# Patient Record
Sex: FEMALE | Race: WHITE | NOT HISPANIC OR LATINO | Employment: FULL TIME | URBAN - METROPOLITAN AREA
[De-identification: names, ages, dates, MRNs, and addresses within clinical notes are randomized per-mention and may not be internally consistent; named-entity substitution may affect disease eponyms.]

---

## 2017-01-18 ENCOUNTER — OFFICE VISIT (OUTPATIENT)
Dept: CARDIOLOGY | Facility: CLINIC | Age: 57
End: 2017-01-18

## 2017-01-18 VITALS
BODY MASS INDEX: 45.99 KG/M2 | HEART RATE: 60 BPM | HEIGHT: 67 IN | WEIGHT: 293 LBS | SYSTOLIC BLOOD PRESSURE: 116 MMHG | DIASTOLIC BLOOD PRESSURE: 74 MMHG

## 2017-01-18 DIAGNOSIS — I48.91 ATRIAL FIBRILLATION, UNSPECIFIED TYPE (HCC): Primary | ICD-10-CM

## 2017-01-18 DIAGNOSIS — I48.0 PAROXYSMAL ATRIAL FIBRILLATION (HCC): Primary | ICD-10-CM

## 2017-01-18 DIAGNOSIS — Z79.01 ANTICOAGULATED: ICD-10-CM

## 2017-01-18 DIAGNOSIS — Z92.29 H/O AMIODARONE THERAPY: ICD-10-CM

## 2017-01-18 DIAGNOSIS — I10 ESSENTIAL HYPERTENSION: ICD-10-CM

## 2017-01-18 PROCEDURE — 99214 OFFICE O/P EST MOD 30 MIN: CPT | Performed by: INTERNAL MEDICINE

## 2017-01-18 PROCEDURE — 93000 ELECTROCARDIOGRAM COMPLETE: CPT | Performed by: INTERNAL MEDICINE

## 2017-01-18 NOTE — PROGRESS NOTES
Subjective:       Anne Gentile is a 56 y.o. female who here for follow up    CC  Atrial fibrillation follow-up  HPI  56-year-old white female with known history of recurrent atrial fibrillation, now on amiodarone, continues to have symptomatic atrial fibrillation, on chronic anticoagulation, complains of shortness of breath but no chest pain, shortness of breath is also associated with fatigue, the symptoms have gotten worse over the last few months indicating patient is back in atrial fibrillation during that time     Problem List Items Addressed This Visit        Cardiovascular and Mediastinum    Hypertension    Atrial fibrillation - Primary    Relevant Orders    ECG 12 Lead       Other    H/O amiodarone therapy    Anticoagulated        Previous treatments/evaluations include: ASA and beta blocker. Cardiac risk factors: advanced age (older than 55 for men, 65 for women) and hypertension.    The following portions of the patient's history were reviewed and updated as appropriate: allergies, current medications, past family history, past medical history, past social history, past surgical history and problem list.    Past Medical History   Diagnosis Date   • Arthritis    • Atrial fibrillation    • CHF (congestive heart failure)    • Depression    • Disease of thyroid gland    • Hypertension     reports that she quit smoking about 5 years ago. She has a 30.00 pack-year smoking history. She has never used smokeless tobacco. She reports that she does not drink alcohol or use illicit drugs.  Family History   Problem Relation Age of Onset   • Cancer Mother    • COPD Father    • Heart disease Father    • Hypertension Father        Review of Systems  Constitutional: No wt loss, fever, fatigue  Gastrointestinal: No nausea, abdominal pain  Behavioral/Psych: No insomnia or anxiety   Cardiovascular shortness of breath  Objective:       Physical Exam             Physical Exam  Visit Vitals   • /74   • Pulse 60   •  "Ht 67\" (170.2 cm)   • Wt (!) 370 lb (168 kg)   • BMI 57.95 kg/m2     General appearance: alert, appears stated age and cooperative, oriented x 3  Neck: no JVD and supple, symmetrical, trachea midline  Lungs: clear to auscultation bilaterally  Heart:Normal PMI,  S1, S2 irregular, no murmur, rub or gallop  Extremities: normal range of motion, no cyanosis or edema,  Pulses: 2+ and symmetric  Skin: Skin color, texture, turgor normal. No rashes or lesions  Psych:  Pleasant and cooperative        Cardiographics  @  ECG 12 Lead  Date/Time: 1/18/2017 10:22 AM  Performed by: AZAM ANGEL  Authorized by: AZAM ANGEL   Comparison: compared with previous ECG   Similar to previous ECG  Rhythm: atrial fibrillation  Clinical impression: abnormal ECG            Echocardiogram:        Current Outpatient Prescriptions:   •  azelastine (ASTELIN) 0.1 % nasal spray, 1-2 sprays into each nostril., Disp: , Rfl:   •  dabigatran etexilate (PRADAXA) 150 MG capsu, Take 1 capsule by mouth 2 (Two) Times a Day., Disp: 30 capsule, Rfl: 0  •  escitalopram (LEXAPRO) 10 MG tablet, TAKE ONE TABLET BY MOUTH DAILY, Disp: , Rfl:   •  fluticasone (FLONASE) 50 MCG/ACT nasal spray, 1 spray into each nostril., Disp: , Rfl:   •  furosemide (LASIX) 20 MG tablet, TAKE ONE TABLET BY MOUTH DAILY, Disp: , Rfl:   •  irbesartan (AVAPRO) 300 MG tablet, TAKE ONE TABLET BY MOUTH DAILY, Disp: , Rfl:   •  nitroglycerin (NITROSTAT) 0.4 MG SL tablet, 1 under the tongue as needed for angina, may repeat q5mins for up three doses, Disp: 100 tablet, Rfl: 11  •  omeprazole (PriLOSEC) 20 MG capsule, Take 40 mg by mouth., Disp: , Rfl:   •  topiramate (TOPAMAX) 50 MG tablet, TAKE ONE TABLET BY MOUTH DAILY, Disp: , Rfl:    Assessment:        Patient Active Problem List   Diagnosis   • Malignant hypertension   • Acute rhinosinusitis   • Knee pain   • Chest pain at rest   • Chest pain   • Chronic headache   • Congestion of respiratory tract   • Depression   • " Gastroesophageal reflux disease without esophagitis   • Hypertension   • Hypothyroidism   • Headache   • Morbid obesity   • Other specified disorders of nose and paranasal sinuses   • Difficulty sleeping   • Unstable angina pectoris   • Atrial fibrillation               Plan:            ICD-10-CM ICD-9-CM   1. Paroxysmal atrial fibrillation I48.0 427.31   2. H/O amiodarone therapy Z92.29 V87.49   3. Essential hypertension I10 401.9   4. Anticoagulated Z79.01 V58.61     Anne Gentile IS ON AMIODARONE,   Significant side effects associated with this medication has been explained     Pros and cons of the medications has been discussed, decision has been to continue the medication   6 months to yearly checkup for eye examination, thyroid function test, chest x-ray and liver function test has been advised    Patient understands well      Procedure , risks and options of cardioversion has been explained. Anne Gentile understands well and agrees.  COUNSELING:    Anne ReidsCounseling was given to patient for the following topics: diagnostic results, risk factor reductions, impressions, risks and benefits of treatment options and importance of treatment compliance .       SMOKING COUNSELING:    Counseling given: Not Answered      EMR Dragon/Transcription disclaimer:   Much of this encounter note is an electronic transcription/translation of spoken language to printed text. The electronic translation of spoken language may permit erroneous, or at times, nonsensical words or phrases to be inadvertently transcribed; Although I have reviewed the note for such errors, some may still exist.

## 2017-01-18 NOTE — MR AVS SNAPSHOT
Anne Seferino   1/18/2017 9:15 AM   Office Visit    Dept Phone:  [phone]   Encounter #:  [id number]    Provider:  Filemon Kumari MD   Department:  [facility]                Your Full Care Plan              Your Updated Medication List          This list is accurate as of: 1/18/17 10:45 AM.  Always use your most recent med list.                azelastine 0.1 % nasal spray   Commonly known as:  ASTELIN       dabigatran etexilate 150 MG capsu   Commonly known as:  PRADAXA   Take 1 capsule by mouth 2 (Two) Times a Day.       escitalopram 10 MG tablet   Commonly known as:  LEXAPRO       fluticasone 50 MCG/ACT nasal spray   Commonly known as:  FLONASE       furosemide 20 MG tablet   Commonly known as:  LASIX       irbesartan 300 MG tablet   Commonly known as:  AVAPRO       nitroglycerin 0.4 MG SL tablet   Commonly known as:  NITROSTAT   1 under the tongue as needed for angina, may repeat q5mins for up three doses       omeprazole 20 MG capsule   Commonly known as:  priLOSEC       topiramate 50 MG tablet   Commonly known as:  TOPAMAX               We Performed the Following     ECG 12 Lead       You Were Diagnosed With        Codes Comments    Paroxysmal atrial fibrillation    -  Primary ICD-10-CM: I48.0  ICD-9-CM: 427.31     H/O amiodarone therapy     ICD-10-CM: Z92.29  ICD-9-CM: V87.49       Instructions     None    Patient Instructions History      Upcoming Appointments     Visit Type Date Time Department    FOLLOW UP 1/18/2017  9:15 AM [facility]      [employer] Signup     Our records indicate that you have an active [employer] account.    You can view your After Visit Summary by going to [employer] and logging in with your [employer] username and password.  If you don't have a [employer] username and password but a parent or guardian has access to your record, the parent or guardian should login with their  "own [employer] username and password and access your record to view the After Visit Summary.    If you have questions, you can email [email or web address] or call [phone] to talk to our [employer] staff.  Remember, [employer] is NOT to be used for urgent needs.  For medical emergencies, dial 911.               Other Info from Your Visit           Allergies     No Known Allergies      Reason for Visit     Follow-up Atrial Fibrillation      Vital Signs     Blood Pressure Pulse Height Weight Body Mass Index Smoking Status    116/74 60 67\" (170.2 cm) 370 lb (168 kg) 57.95 kg/m2 Former Smoker      Problems and Diagnoses Noted     Atrial fibrillation (irregular heartbeat)    History of amiodarone therapy            "

## 2017-01-23 ENCOUNTER — HOSPITAL ENCOUNTER (OUTPATIENT)
Facility: HOSPITAL | Age: 57
Setting detail: OBSERVATION
Discharge: HOME | End: 2017-01-25
Attending: INTERNAL MEDICINE | Admitting: INTERNAL MEDICINE

## 2017-01-23 ENCOUNTER — APPOINTMENT (OUTPATIENT)
Dept: GENERAL RADIOLOGY | Facility: HOSPITAL | Age: 57
End: 2017-01-23

## 2017-01-23 PROBLEM — I48.91 A-FIB (HCC): Status: ACTIVE | Noted: 2017-01-23

## 2017-01-23 LAB
ALBUMIN SERPL-MCNC: 3.9 G/DL (ref 3.5–5.2)
ALBUMIN/GLOB SERPL: 1.1 G/DL
ALP SERPL-CCNC: 106 U/L (ref 39–117)
ALT SERPL W P-5'-P-CCNC: 19 U/L (ref 1–33)
ANION GAP SERPL CALCULATED.3IONS-SCNC: 11.4 MMOL/L
AST SERPL-CCNC: 17 U/L (ref 1–32)
BASOPHILS # BLD AUTO: 0.03 10*3/MM3 (ref 0–0.2)
BASOPHILS NFR BLD AUTO: 0.5 % (ref 0–1.5)
BILIRUB SERPL-MCNC: 0.3 MG/DL (ref 0.1–1.2)
BUN BLD-MCNC: 15 MG/DL (ref 6–20)
BUN/CREAT SERPL: 16 (ref 7–25)
CALCIUM SPEC-SCNC: 9.3 MG/DL (ref 8.6–10.5)
CHLORIDE SERPL-SCNC: 105 MMOL/L (ref 98–107)
CO2 SERPL-SCNC: 27.6 MMOL/L (ref 22–29)
CREAT BLD-MCNC: 0.94 MG/DL (ref 0.57–1)
DEPRECATED RDW RBC AUTO: 50.8 FL (ref 37–54)
EOSINOPHIL # BLD AUTO: 0.15 10*3/MM3 (ref 0–0.7)
EOSINOPHIL NFR BLD AUTO: 2.4 % (ref 0.3–6.2)
ERYTHROCYTE [DISTWIDTH] IN BLOOD BY AUTOMATED COUNT: 13.7 % (ref 11.7–13)
GFR SERPL CREATININE-BSD FRML MDRD: 61 ML/MIN/1.73
GLOBULIN UR ELPH-MCNC: 3.4 GM/DL
GLUCOSE BLD-MCNC: 99 MG/DL (ref 65–99)
HCT VFR BLD AUTO: 40.3 % (ref 35.6–45.5)
HGB BLD-MCNC: 12.2 G/DL (ref 11.9–15.5)
IMM GRANULOCYTES # BLD: 0 10*3/MM3 (ref 0–0.03)
IMM GRANULOCYTES NFR BLD: 0 % (ref 0–0.5)
INR PPP: 1.42 (ref 0.9–1.1)
LYMPHOCYTES # BLD AUTO: 1.95 10*3/MM3 (ref 0.9–4.8)
LYMPHOCYTES NFR BLD AUTO: 31.5 % (ref 19.6–45.3)
MAGNESIUM SERPL-MCNC: 2.4 MG/DL (ref 1.6–2.6)
MCH RBC QN AUTO: 30.5 PG (ref 26.9–32)
MCHC RBC AUTO-ENTMCNC: 30.3 G/DL (ref 32.4–36.3)
MCV RBC AUTO: 100.8 FL (ref 80.5–98.2)
MONOCYTES # BLD AUTO: 0.3 10*3/MM3 (ref 0.2–1.2)
MONOCYTES NFR BLD AUTO: 4.8 % (ref 5–12)
NEUTROPHILS # BLD AUTO: 3.76 10*3/MM3 (ref 1.9–8.1)
NEUTROPHILS NFR BLD AUTO: 60.8 % (ref 42.7–76)
NT-PROBNP SERPL-MCNC: 316.1 PG/ML (ref 5–900)
PLATELET # BLD AUTO: 193 10*3/MM3 (ref 140–500)
PMV BLD AUTO: 10 FL (ref 6–12)
POTASSIUM BLD-SCNC: 4.3 MMOL/L (ref 3.5–5.2)
PROT SERPL-MCNC: 7.3 G/DL (ref 6–8.5)
PROTHROMBIN TIME: 16.8 SECONDS (ref 11.7–14.2)
RBC # BLD AUTO: 4 10*6/MM3 (ref 3.9–5.2)
SODIUM BLD-SCNC: 144 MMOL/L (ref 136–145)
T3FREE SERPL-MCNC: 1.92 PG/ML (ref 2–4.4)
TSH SERPL DL<=0.05 MIU/L-ACNC: 4.18 MIU/ML (ref 0.27–4.2)
WBC NRBC COR # BLD: 6.19 10*3/MM3 (ref 4.5–10.7)

## 2017-01-23 PROCEDURE — 93005 ELECTROCARDIOGRAM TRACING: CPT | Performed by: NURSE PRACTITIONER

## 2017-01-23 PROCEDURE — 83880 ASSAY OF NATRIURETIC PEPTIDE: CPT | Performed by: NURSE PRACTITIONER

## 2017-01-23 PROCEDURE — 85610 PROTHROMBIN TIME: CPT | Performed by: NURSE PRACTITIONER

## 2017-01-23 PROCEDURE — 80053 COMPREHEN METABOLIC PANEL: CPT | Performed by: NURSE PRACTITIONER

## 2017-01-23 PROCEDURE — 84481 FREE ASSAY (FT-3): CPT | Performed by: NURSE PRACTITIONER

## 2017-01-23 PROCEDURE — 25010000002 AMIODARONE IN DEXTROSE 5% 360 MG/200ML SOLUTION: Performed by: NURSE PRACTITIONER

## 2017-01-23 PROCEDURE — 84443 ASSAY THYROID STIM HORMONE: CPT | Performed by: NURSE PRACTITIONER

## 2017-01-23 PROCEDURE — 25010000002 AMIODARONE IN DEXTROSE 5% 150 MG/100ML SOLUTION: Performed by: NURSE PRACTITIONER

## 2017-01-23 PROCEDURE — 93010 ELECTROCARDIOGRAM REPORT: CPT | Performed by: INTERNAL MEDICINE

## 2017-01-23 PROCEDURE — 83735 ASSAY OF MAGNESIUM: CPT | Performed by: NURSE PRACTITIONER

## 2017-01-23 PROCEDURE — G0378 HOSPITAL OBSERVATION PER HR: HCPCS

## 2017-01-23 PROCEDURE — 71010 HC CHEST PA OR AP: CPT

## 2017-01-23 PROCEDURE — 99219 PR INITIAL OBSERVATION CARE/DAY 50 MINUTES: CPT | Performed by: INTERNAL MEDICINE

## 2017-01-23 PROCEDURE — 85025 COMPLETE CBC W/AUTO DIFF WBC: CPT | Performed by: NURSE PRACTITIONER

## 2017-01-23 RX ORDER — CARVEDILOL 12.5 MG/1
12.5 TABLET ORAL 2 TIMES DAILY WITH MEALS
COMMUNITY
End: 2018-12-12 | Stop reason: SDUPTHER

## 2017-01-23 RX ORDER — CARVEDILOL 12.5 MG/1
12.5 TABLET ORAL 2 TIMES DAILY WITH MEALS
Status: DISCONTINUED | OUTPATIENT
Start: 2017-01-23 | End: 2017-01-25 | Stop reason: HOSPADM

## 2017-01-23 RX ORDER — IRBESARTAN 150 MG/1
300 TABLET ORAL
Status: DISCONTINUED | OUTPATIENT
Start: 2017-01-24 | End: 2017-01-25 | Stop reason: HOSPADM

## 2017-01-23 RX ORDER — FUROSEMIDE 20 MG/1
20 TABLET ORAL DAILY
Status: DISCONTINUED | OUTPATIENT
Start: 2017-01-24 | End: 2017-01-25 | Stop reason: HOSPADM

## 2017-01-23 RX ORDER — SODIUM CHLORIDE 0.9 % (FLUSH) 0.9 %
1-10 SYRINGE (ML) INJECTION AS NEEDED
Status: DISCONTINUED | OUTPATIENT
Start: 2017-01-23 | End: 2017-01-25 | Stop reason: HOSPADM

## 2017-01-23 RX ORDER — ASPIRIN 81 MG/1
81 TABLET, CHEWABLE ORAL DAILY
Status: DISCONTINUED | OUTPATIENT
Start: 2017-01-24 | End: 2017-01-25 | Stop reason: HOSPADM

## 2017-01-23 RX ORDER — NITROGLYCERIN 0.4 MG/1
0.4 TABLET SUBLINGUAL
Status: DISCONTINUED | OUTPATIENT
Start: 2017-01-23 | End: 2017-01-25 | Stop reason: HOSPADM

## 2017-01-23 RX ORDER — AZELASTINE 1 MG/ML
1 SPRAY, METERED NASAL DAILY
Status: DISCONTINUED | OUTPATIENT
Start: 2017-01-23 | End: 2017-01-23

## 2017-01-23 RX ORDER — FLUTICASONE PROPIONATE 50 MCG
1 SPRAY, SUSPENSION (ML) NASAL DAILY
Status: DISCONTINUED | OUTPATIENT
Start: 2017-01-23 | End: 2017-01-23

## 2017-01-23 RX ORDER — DABIGATRAN ETEXILATE 150 MG/1
150 CAPSULE ORAL 2 TIMES DAILY
Status: DISCONTINUED | OUTPATIENT
Start: 2017-01-23 | End: 2017-01-24

## 2017-01-23 RX ORDER — ASPIRIN 81 MG/1
81 TABLET, CHEWABLE ORAL DAILY
COMMUNITY

## 2017-01-23 RX ORDER — LEVOTHYROXINE SODIUM 0.15 MG/1
150 TABLET ORAL DAILY
COMMUNITY
End: 2021-07-16 | Stop reason: HOSPADM

## 2017-01-23 RX ORDER — LEVOTHYROXINE SODIUM 0.15 MG/1
150 TABLET ORAL DAILY
Status: DISCONTINUED | OUTPATIENT
Start: 2017-01-24 | End: 2017-01-25 | Stop reason: HOSPADM

## 2017-01-23 RX ORDER — NITROGLYCERIN 0.4 MG/1
0.4 TABLET SUBLINGUAL
Status: DISCONTINUED | OUTPATIENT
Start: 2017-01-23 | End: 2017-01-23 | Stop reason: SDUPTHER

## 2017-01-23 RX ORDER — PANTOPRAZOLE SODIUM 40 MG/1
40 TABLET, DELAYED RELEASE ORAL
Status: DISCONTINUED | OUTPATIENT
Start: 2017-01-23 | End: 2017-01-25 | Stop reason: HOSPADM

## 2017-01-23 RX ORDER — ACETAMINOPHEN 325 MG/1
650 TABLET ORAL EVERY 4 HOURS PRN
Status: DISCONTINUED | OUTPATIENT
Start: 2017-01-23 | End: 2017-01-25 | Stop reason: HOSPADM

## 2017-01-23 RX ORDER — TOPIRAMATE 50 MG/1
50 TABLET, FILM COATED ORAL DAILY
Status: DISCONTINUED | OUTPATIENT
Start: 2017-01-24 | End: 2017-01-25 | Stop reason: HOSPADM

## 2017-01-23 RX ORDER — ESCITALOPRAM OXALATE 10 MG/1
10 TABLET ORAL DAILY
Status: DISCONTINUED | OUTPATIENT
Start: 2017-01-24 | End: 2017-01-25 | Stop reason: HOSPADM

## 2017-01-23 RX ADMIN — AMIODARONE HYDROCHLORIDE 150 MG: 1.5 INJECTION, SOLUTION INTRAVENOUS at 12:22

## 2017-01-23 RX ADMIN — AMIODARONE HYDROCHLORIDE 1 MG/MIN: 1.8 INJECTION, SOLUTION INTRAVENOUS at 12:36

## 2017-01-23 RX ADMIN — ACETAMINOPHEN 650 MG: 325 TABLET ORAL at 16:35

## 2017-01-23 RX ADMIN — DABIGATRAN ETEXILATE MESYLATE 150 MG: 150 CAPSULE ORAL at 17:09

## 2017-01-23 RX ADMIN — CARVEDILOL 12.5 MG: 12.5 TABLET, FILM COATED ORAL at 17:09

## 2017-01-23 RX ADMIN — AMIODARONE HYDROCHLORIDE 0.5 MG/MIN: 1.8 INJECTION, SOLUTION INTRAVENOUS at 18:08

## 2017-01-23 NOTE — IP AVS SNAPSHOT
AFTER VISIT SUMMARY             Anne Gentile           About your hospitalization     You were admitted on:  January 23, 2017 You last received care in the:  [address]       Procedures & Surgeries         Medications    If you or your caregiver advised us that you are currently taking a medication and that medication is marked below as “Resume”, this simply indicates that we have reviewed those medications to make sure our new therapy recommendations do not interfere.  If you have concerns about medications other than those new ones which we are prescribing today, please consult the physician who prescribed them (or your primary physician).  Our review of your home medications is not meant to indicate that we are directing their use.             Your Medications      START taking these medications     amiodarone 400 MG tablet   Take 0.5 tablets by mouth Every 12 (Twelve) Hours. 200 mg 3 times a day for one week 200 mg twice a day for one week then 200 mg daily   Last time this was given:  1/25/2017  8:19 AM   Commonly known as:  PACERONE             CONTINUE taking these medications     aspirin 81 MG chewable tablet   Chew 81 mg Daily.   Last time this was given:  1/25/2017  8:20 AM           azelastine 0.1 % nasal spray   1-2 sprays into each nostril.   Commonly known as:  ASTELIN           carvedilol 12.5 MG tablet   Take 12.5 mg by mouth 2 (Two) Times a Day With Meals.   Last time this was given:  1/25/2017  8:20 AM   Commonly known as:  COREG           dabigatran etexilate 150 MG capsu   Take 1 capsule by mouth 2 (Two) Times a Day.   Last time this was given:  1/25/2017  8:19 AM   Commonly known as:  PRADAXA           escitalopram 10 MG tablet   TAKE ONE TABLET BY MOUTH DAILY   Last time this was given:  1/25/2017  8:20 AM   Commonly known as:  LEXAPRO           fluticasone 50 MCG/ACT nasal spray   1 spray into each nostril.   Commonly known as:  FLONASE           furosemide 20 MG  tablet   TAKE ONE TABLET BY MOUTH DAILY   Last time this was given:  1/25/2017  8:19 AM   Commonly known as:  LASIX           irbesartan 300 MG tablet   TAKE ONE TABLET BY MOUTH DAILY   Last time this was given:  1/25/2017  8:19 AM   Commonly known as:  AVAPRO           levothyroxine 150 MCG tablet   Take 150 mcg by mouth Daily.   Last time this was given:  1/25/2017  8:19 AM   Commonly known as:  SYNTHROID, LEVOTHROID           nitroglycerin 0.4 MG SL tablet   1 under the tongue as needed for angina, may repeat q5mins for up three doses   Commonly known as:  NITROSTAT           omeprazole 20 MG capsule   Take 40 mg by mouth.   Commonly known as:  priLOSEC           topiramate 50 MG tablet   TAKE ONE TABLET BY MOUTH DAILY   Last time this was given:  1/25/2017  8:19 AM   Commonly known as:  TOPAMAX                Where to Get Your Medications      These medications were sent to [address] [facility] [phone] [facility] [phone] [place] [address]     Phone:  [phone]     amiodarone 400 MG tablet         Information about where to get these medications is not yet available     ! Ask your nurse or doctor about these medications     dabigatran etexilate 150 MG capsu                  Your Medications      Your Medication List           Morning Noon Evening Bedtime As Needed    amiodarone 400 MG tablet   Take 0.5 tablets by mouth Every 12 (Twelve) Hours. 200 mg 3 times a day for one week 200 mg twice a day for one week then 200 mg daily   Commonly known as:  PACERONE                         Take 3x times a day for 1 week, take 2x a day for 1 week, then daily        aspirin 81 MG chewable tablet   Chew 81 mg Daily.                                   azelastine 0.1 % nasal spray   1-2 sprays into each nostril.   Commonly known as:  ASTELIN                                   carvedilol 12.5 MG tablet   Take 12.5 mg by mouth 2  (Two) Times a Day With Meals.   Commonly known as:  COREG                                      dabigatran etexilate 150 MG capsu   Take 1 capsule by mouth 2 (Two) Times a Day.   Commonly known as:  PRADAXA                                      escitalopram 10 MG tablet   TAKE ONE TABLET BY MOUTH DAILY   Commonly known as:  LEXAPRO                                   fluticasone 50 MCG/ACT nasal spray   1 spray into each nostril.   Commonly known as:  FLONASE                                   furosemide 20 MG tablet   TAKE ONE TABLET BY MOUTH DAILY   Commonly known as:  LASIX                                   irbesartan 300 MG tablet   TAKE ONE TABLET BY MOUTH DAILY   Commonly known as:  AVAPRO                                   levothyroxine 150 MCG tablet   Take 150 mcg by mouth Daily.   Commonly known as:  SYNTHROID, LEVOTHROID                                   nitroglycerin 0.4 MG SL tablet   1 under the tongue as needed for angina, may repeat q5mins for up three doses   Commonly known as:  NITROSTAT                                   omeprazole 20 MG capsule   Take 40 mg by mouth.   Commonly known as:  priLOSEC                                   topiramate 50 MG tablet   TAKE ONE TABLET BY MOUTH DAILY   Commonly known as:  TOPAMAX                                            Instructions for After Discharge        Discharge References/Attachments     ELECTRICAL CARDIOVERSION (ENGLISH)    AMIODARONE TABLETS (ENGLISH)       Follow-ups for After Discharge        Follow-up Information     Follow up with Filemon Kumari MD. Call on 1/25/2017.    Specialty:  Cardiology    Why:  Call today and make a 2 week follow up with Dr. Kumari's office!    Contact information:    [address] [facility] [address] [phone]        [employer] Signup     Our records indicate that you have an active [employer] account.    You can view your After Visit Summary by going to [employer] and logging in  with your [employer] username and password.  If you don't have a [employer] username and password but a parent or guardian has access to your record, the parent or guardian should login with their own [employer] username and password and access your record to view the After Visit Summary.    If you have questions, you can email [email or web address] or call [phone] to talk to our [employer] staff.  Remember, [employer] is NOT to be used for urgent needs.  For medical emergencies, dial 911.           Summary of Your Hospitalization        Reason for Hospitalization     Your primary diagnosis was:  Atrial Fibrillation (Irregular Heartbeat)      Care Providers     Provider Service Role Specialty    Filemon Kumari MD -- Attending Provider Cardiology      Your Allergies  Date Reviewed: 1/23/2017    No active allergies      Patient Belongings Returned     Document Return of Belongings Flowsheet     Were the patient bedside belongings sent home?   Yes   Belongings Retrieved from Security & Sent Home   N/A    Belongings Sent to Safe   --   Medications Retrieved from Pharmacy & Sent Home   N/A              More Information      Electrical Cardioversion  Electrical cardioversion is the delivery of a jolt of electricity to change the rhythm of the heart. Sticky patches or metal paddles are placed on the chest to deliver the electricity from a device. This is done to restore a normal rhythm. A rhythm that is too fast or not regular keeps the heart from pumping well.  Electrical cardioversion is done in an emergency if:   · There is low or no blood pressure as a result of the heart rhythm.    · Normal rhythm must be restored as fast as possible to protect the brain and heart from further damage.    · It may save a life.  Cardioversion may be done for heart rhythms that are not immediately life threatening, such as atrial fibrillation or flutter, in which:   · The heart is beating too fast or is not regular.    · Medicine to  change the rhythm has not worked.    · It is safe to wait in order to allow time for preparation.  · Symptoms of the abnormal rhythm are bothersome.  · The risk of stroke and other serious problems can be reduced.  LET YOUR HEALTH CARE PROVIDER KNOW ABOUT:   · Any allergies you have.  · All medicines you are taking, including vitamins, herbs, eye drops, creams, and over-the-counter medicines.  · Previous problems you or members of your family have had with the use of anesthetics.    · Any blood disorders you have.    · Previous surgeries you have had.    · Medical conditions you have.  RISKS AND COMPLICATIONS   Generally, this is a safe procedure. However, problems can occur and include:   · Breathing problems related to the anesthetic used.  · A blood clot that breaks free and travels to other parts of your body. This could cause a stroke or other problems. The risk of this is lowered by use of blood-thinning medicine (anticoagulant) prior to the procedure.  · Cardiac arrest (rare).  BEFORE THE PROCEDURE   · You may have tests to detect blood clots in your heart and to evaluate heart function.   · You may start taking anticoagulants so your blood does not clot as easily.    · Medicines may be given to help stabilize your heart rate and rhythm.  PROCEDURE  · You will be given medicine through an IV tube to reduce discomfort and make you sleepy (sedative).    · An electrical shock will be delivered.  AFTER THE PROCEDURE  Your heart rhythm will be watched to make sure it does not change.      This information is not intended to replace advice given to you by your health care provider. Make sure you discuss any questions you have with your health care provider.     Document Released: 12/08/2003 Document Revised: 01/08/2016 Document Reviewed: 07/02/2014  "[employer] Interactive Patient Education ©2016 "[employer] Inc.          Amiodarone tablets  What is this medicine?  AMIODARONE (a SANTY oh da antolin) is an antiarrhythmic drug.  It helps make your heart beat regularly. Because of the side effects caused by this medicine, it is only used when other medicines have not worked. It is usually used for heartbeat problems that may be life threatening.  This medicine may be used for other purposes; ask your health care provider or pharmacist if you have questions.  What should I tell my health care provider before I take this medicine?  They need to know if you have any of these conditions:  -liver disease  -lung disease  -other heart problems  -thyroid disease  -an unusual or allergic reaction to amiodarone, iodine, other medicines, foods, dyes, or preservatives  -pregnant or trying to get pregnant  -breast-feeding  How should I use this medicine?  Take this medicine by mouth with a glass of water. Follow the directions on the prescription label. You can take this medicine with or without food. However, you should always take it the same way each time. Take your doses at regular intervals. Do not take your medicine more often than directed. Do not stop taking except on the advice of your doctor or health care professional.  A special MedGuide will be given to you by the pharmacist with each prescription and refill. Be sure to read this information carefully each time.  Talk to your pediatrician regarding the use of this medicine in children. Special care may be needed.  Overdosage: If you think you have taken too much of this medicine contact a poison control center or emergency room at once.  NOTE: This medicine is only for you. Do not share this medicine with others.  What if I miss a dose?  If you miss a dose, take it as soon as you can. If it is almost time for your next dose, take only that dose. Do not take double or extra doses.  What may interact with this medicine?  Do not take this medicine with any of the following medications:  -abarelix  -apomorphine  -arsenic trioxide  -certain antibiotics like erythromycin, gemifloxacin,  levofloxacin, pentamidine  -certain medicines for depression like amoxapine, tricyclic antidepressants  -certain medicines for fungal infections like fluconazole, itraconazole, ketoconazole, posaconazole, voriconazole  -certain medicines for irregular heart beat like disopyramide, dofetilide, dronedarone, ibutilide, propafenone, sotalol  -certain medicines for malaria like chloroquine, halofantrine  -cisapride  -droperidol  -haloperidol  -hawthorn  -maprotiline  -methadone  -phenothiazines like chlorpromazine, mesoridazine, thioridazine  -pimozide  -ranolazine  -red yeast rice  -vardenafil  -ziprasidone  This medicine may also interact with the following medications:  -antiviral medicines for HIV or AIDS  -certain medicines for blood pressure, heart disease, irregular heart beat  -certain medicines for cholesterol like atorvastatin, cerivastatin, lovastatin, simvastatin  -certain medicines for hepatitis C like sofosbuvir and ledipasvir; sofosbuvir  -certain medicines for seizures like phenytoin  -certain medicines for thyroid problems  -certain medicines that treat or prevent blood clots like warfarin  -cholestyramine  -cimetidine  -clopidogrel  -cyclosporine  -dextromethorphan  -diuretics  -fentanyl  -general anesthetics  -grapefruit juice  -lidocaine  -loratadine  -methotrexate  -other medicines that prolong the QT interval (cause an abnormal heart rhythm)  -procainamide  -quinidine  -rifabutin, rifampin, or rifapentine  -[place]'s Wort  -trazodone  This list may not describe all possible interactions. Give your health care provider a list of all the medicines, herbs, non-prescription drugs, or dietary supplements you use. Also tell them if you smoke, drink alcohol, or use illegal drugs. Some items may interact with your medicine.  What should I watch for while using this medicine?  Your condition will be monitored closely when you first begin therapy. Often, this drug is first started in a hospital or other  monitored health care setting. Once you are on maintenance therapy, visit your doctor or health care professional for regular checks on your progress. Because your condition and use of this medicine carry some risk, it is a good idea to carry an identification card, necklace or bracelet with details of your condition, medications, and doctor or health care professional.  You may get drowsy or dizzy. Do not drive, use machinery, or do anything that needs mental alertness until you know how this medicine affects you. Do not stand or sit up quickly, especially if you are an older patient. This reduces the risk of dizzy or fainting spells.  This medicine can make you more sensitive to the sun. Keep out of the sun. If you cannot avoid being in the sun, wear protective clothing and use sunscreen. Do not use sun lamps or tanning beds/booths.  You should have regular eye exams before and during treatment. Call your doctor if you have blurred vision, see halos, or your eyes become sensitive to light. Your eyes may get dry. It may be helpful to use a lubricating eye solution or artificial tears solution.  If you are going to have surgery or a procedure that requires contrast dyes, tell your doctor or health care professional that you are taking this medicine.  What side effects may I notice from receiving this medicine?  Side effects that you should report to your doctor or health care professional as soon as possible:  -allergic reactions like skin rash, itching or hives, swelling of the face, lips, or tongue  -blue-gray coloring of the skin  -blurred vision, seeing blue green halos, increased sensitivity of the eyes to light  -breathing problems  -chest pain  -dark urine  -fast, irregular heartbeat  -feeling faint or light-headed  -intolerance to heat or cold  -nausea or vomiting  -pain and swelling of the scrotum  -pain, tingling, numbness in feet, hands  -redness, blistering, peeling or loosening of the skin, including  inside the mouth  -spitting up blood  -stomach pain  -sweating  -unusual or uncontrolled movements of body  -unusually weak or tired  -weight gain or loss  -yellowing of the eyes or skin  Side effects that usually do not require medical attention (report to your doctor or health care professional if they continue or are bothersome):  -change in sex drive or performance  -constipation  -dizziness  -headache  -loss of appetite  -trouble sleeping  This list may not describe all possible side effects. Call your doctor for medical advice about side effects. You may report side effects to FDA at 0-509-SOC-5746.  Where should I keep my medicine?  Keep out of the reach of children.  Store at room temperature between 20 and 25 degrees C (68 and 77 degrees F). Protect from light. Keep container tightly closed. Throw away any unused medicine after the expiration date.  NOTE: This sheet is a summary. It may not cover all possible information. If you have questions about this medicine, talk to your doctor, pharmacist, or health care provider.     © 2016, [employer]. (2015-03-23 19:48:11)            SYMPTOMS OF A STROKE    Call 911 or have someone take you to the Emergency Department if you have any of the following:    · Sudden numbness or weakness of your face, arm or leg especially on one side of the body  · Sudden confusion, diffiiculty speaking or trouble understanding   · Changes in your vision or loss of sight in one eye  · Sudden severe headache with no known cause  · sudden dizziness, trouble walking, loss of balance or coordination    It is important to seek emergency care right away if you have further stroke symptoms. If you get emergency help quickly, the powerful clot-dissolving medicines can reduce the disabilities caused by a stroke.     For more information:    American Stroke Association  0-499-4-STROKE  www.strokeassociation.org           IF YOU SMOKE OR USE TOBACCO PLEASE READ THE FOLLOWING:    Why  is smoking bad for me?  Smoking increases the risk of heart disease, lung disease, vascular disease, stroke, and cancer.     If you smoke, STOP!    If you would like more information on quitting smoking, please visit the [employer] website: [email or web address]   This link will provide additional resources including the QUIT line and the Beat the Pack support groups.     For more information:    American Cancer Society  ([phone] 199-7597    American Heart Association  1-227.889.2567               YOU ARE THE MOST IMPORTANT FACTOR IN YOUR RECOVERY.     Follow all instructions carefully.     I have reviewed my discharge instructions with my nurse, including the following information, if applicable:     Information about my illness and diagnosis   Follow up appointments (including lab draws)   Wound Care   Equipment Needs   Medications (new and continuing) along with side effects   Preventative information such as vaccines and smoking cessations   Diet   Pain   I know when to contact my Doctor's office or seek emergency care      I want my nurse to describe the side effects of my medications: YES NO   If the answer is no, I understand the side effects of my medications: YES NO   My nurse described the side effects of my medications in a way that I could understand: YES NO   I have taken my personal belongings and my own medications with me at discharge: YES NO            I have received this information and my questions have been answered. I have discussed any concerns I see with this plan with the nurse or physician. I understand these instructions.    Signature of Patient or Responsible Person: _____________________________________    Date: _________________  Time: __________________    Signature of Healthcare Provider: _______________________________________  Date: _________________  Time: __________________

## 2017-01-23 NOTE — PLAN OF CARE
Problem: Patient Care Overview (Adult)  Goal: Plan of Care Review  Outcome: Ongoing (interventions implemented as appropriate)    01/23/17 1456   Coping/Psychosocial Response Interventions   Plan Of Care Reviewed With patient   Patient Care Overview   Progress no change   Outcome Evaluation   Outcome Summary/Follow up Plan patient DA this am for a-fib, amioadrone gtt started, echo ordered, plan for cardioversion in am, npo at midnight        Goal: Adult Individualization and Mutuality  Outcome: Ongoing (interventions implemented as appropriate)  Goal: Discharge Needs Assessment  Outcome: Ongoing (interventions implemented as appropriate)    01/23/17 1456   Discharge Needs Assessment   Concerns To Be Addressed denies needs/concerns at this time   Equipment Needed After Discharge none   Discharge Disposition still a patient   Self-Care   Equipment Currently Used at Home none   Living Environment   Transportation Available car;family or friend will provide         Problem: Arrhythmia/Dysrhythmia (Symptomatic) (Adult)  Goal: Signs and Symptoms of Listed Potential Problems Will be Absent or Manageable (Arrhythmia/Dysrhythmia)  Outcome: Ongoing (interventions implemented as appropriate)

## 2017-01-23 NOTE — H&P
[facility]  Cardiology Progress Note    Patient Identification:  Name: Anne Gentile  Age: 56 y.o.  Sex: female  :  1960  MRN: [id number]                 Follow Up / Chief Complaint: Persistent atrial fibrillation, dyspnea on exertion    Interval History:  Patient seen in office reporting fatigability and shortness of breath.  Was found to have recurrent atrial fibrillation.  She has been on adequate course of anticoagulation with Pradaxa Admitted for initiation of amiodarone and cardioversion if needed     Subjective:  Denies chest pain, pressure, dizziness, lightheadedness, nausea or vomiting.  Reports fatigability and dyspnea on exertion and denies cough or PND    Objective:     atrial fib, controlled ventricular rate.  Admission EKG pending     Past Medical History:  Past Medical History   Diagnosis Date   • Arthritis    • Atrial fibrillation    • CHF (congestive heart failure)    • Depression    • Disease of thyroid gland    • Hypertension      Past Surgical History:  Past Surgical History   Procedure Laterality Date   • Cholecystectomy  26+ years ago   • Breast surgery     • Knee arthroscopy Right         Social History:   Social History   Substance Use Topics   • Smoking status: Former Smoker     Packs/day: 1.50     Years: 20.00     Quit date: 3/23/2011   • Smokeless tobacco: Never Used   • Alcohol use No      Family History:  Family History   Problem Relation Age of Onset   • Cancer Mother    • COPD Father    • Heart disease Father    • Hypertension Father           Allergies:  No Known Allergies  Scheduled Meds:          INTAKE AND OUTPUT:  No intake or output data in the 24 hours ending 17 1021    Review of Systems:   GI: No nausea or vomiting  Cardiac: Nns   Pulmonary:o chest pain or palpitations   Pulmonary: Dyspnea on exertion    Constitutional:  Temp:  [98 °F (36.7 °C)] 98 °F (36.7 °C)  Heart Rate:  [76] 76  Resp:  [18] 18  BP: (149)/(95) 149/95    Physical Exam by  Filemon Kumari MD  General:  Appears in no acute distress  Eyes: PERTL,  HEENT:   Thyroid not visibly enlarged. No mucosal pallor or cyanosis  Respiratory: Respirations regular and unlabored at rest. BBS with good air entry in all fields. No cracklesor wheezes auscultated  Cardiovascular: S1S2 irregular rate and rhythm. No murmur, rub or gallop.  DP pulses 2  No pretibial pitting edema  Gastrointestinal: Abdomen soft, obese, non tender. Bowel sounds present.  Musculoskeletal: BAUER x4. No abnormal movements  Extremities: No digital clubbing or cyanosis  Skin: Color pink. Skin warm and dry to touch. No rashes    Neuro: AAO x3 CN II-XII grossly intact  Psych: Mood and affect normal, pleasant and cooperative          Cardiographics  Telemetry: afib, cvr 70's    ECG: pending    Echocardiogram 6-2016:   · All left ventricular wall segments contract normally.  · Left Ventricle: Calculated EF = 45%  · There is no evidence of pericardial effusion.  · Trace mitral valve regurgitation is present.    Lab Review       Assessment:  - symptomatic atrial fibrillation  - h/o cardioversion 3-2016  - EF 35-40%, 2+ TR/MR   - hypertension  - Hypothyroidism  - suspected SAMI        Plan:  Start IV amiodarone per protocol  Proceed with cardioversion tomorrow if she remains in atrial fibrillation. Check baseline chest x-ray and TFTs.  While patient is taking Amiodarone,   yearly eye exams, thyroid function studies, liver function studies and chest x-ray are recommended        I reviewed the patient's new clinical results and treatment plan with Dr Kumari. I personally viewed and interpreted the patient's EKG/Telemetry data    )1/23/2017  Filemon Kumari MD      EMR Dragon/Transcription disclaimer:   Much of this encounter note is an electronic transcription/translation of spoken language to printed text. The electronic translation of spoken language may permit erroneous, or at times, nonsensical words or phrases to be  inadvertently transcribed; Although I have reviewed the note for such errors, some may still exist.

## 2017-01-24 ENCOUNTER — ANESTHESIA (OUTPATIENT)
Dept: CARDIOLOGY | Facility: HOSPITAL | Age: 57
End: 2017-01-24

## 2017-01-24 ENCOUNTER — ANESTHESIA EVENT (OUTPATIENT)
Dept: CARDIOLOGY | Facility: HOSPITAL | Age: 57
End: 2017-01-24

## 2017-01-24 ENCOUNTER — APPOINTMENT (OUTPATIENT)
Dept: CARDIOLOGY | Facility: HOSPITAL | Age: 57
End: 2017-01-24

## 2017-01-24 LAB
BH CV ECHO MEAS - ACS: 2.1 CM
BH CV ECHO MEAS - AO MEAN PG (FULL): 1 MMHG
BH CV ECHO MEAS - AO MEAN PG: 2 MMHG
BH CV ECHO MEAS - AO ROOT AREA (BSA CORRECTED): 1.1
BH CV ECHO MEAS - AO ROOT AREA: 6.6 CM^2
BH CV ECHO MEAS - AO ROOT DIAM: 2.9 CM
BH CV ECHO MEAS - AO V2 MAX: 1.1 CM/SEC
BH CV ECHO MEAS - AO V2 MEAN: 70.2 CM/SEC
BH CV ECHO MEAS - AO V2 VTI: 19.9 CM
BH CV ECHO MEAS - ASC AORTA: 3.4 CM
BH CV ECHO MEAS - AVA(I,A): 2.9 CM^2
BH CV ECHO MEAS - AVA(I,D): 2.9 CM^2
BH CV ECHO MEAS - BSA(HAYCOCK): 2.9 M^2
BH CV ECHO MEAS - BSA: 2.6 M^2
BH CV ECHO MEAS - BZI_BMI: 57.8 KILOGRAMS/M^2
BH CV ECHO MEAS - BZI_METRIC_HEIGHT: 170.2 CM
BH CV ECHO MEAS - BZI_METRIC_WEIGHT: 167.4 KG
BH CV ECHO MEAS - CONTRAST EF (2CH): 50 ML/M^2
BH CV ECHO MEAS - CONTRAST EF 4CH: 52.2 ML/M^2
BH CV ECHO MEAS - EDV(CUBED): 175.6 ML
BH CV ECHO MEAS - EDV(MOD-SP2): 60 ML
BH CV ECHO MEAS - EDV(MOD-SP4): 92 ML
BH CV ECHO MEAS - EDV(TEICH): 153.7 ML
BH CV ECHO MEAS - EF(CUBED): 37 %
BH CV ECHO MEAS - EF(MOD-SP2): 50 %
BH CV ECHO MEAS - EF(MOD-SP4): 52.2 %
BH CV ECHO MEAS - EF(TEICH): 30 %
BH CV ECHO MEAS - ESV(CUBED): 110.6 ML
BH CV ECHO MEAS - ESV(MOD-SP2): 30 ML
BH CV ECHO MEAS - ESV(MOD-SP4): 44 ML
BH CV ECHO MEAS - ESV(TEICH): 107.5 ML
BH CV ECHO MEAS - FS: 14.3 %
BH CV ECHO MEAS - IVS/LVPW: 1
BH CV ECHO MEAS - IVSD: 1.5 CM
BH CV ECHO MEAS - LAT PEAK E' VEL: 15 CM/SEC
BH CV ECHO MEAS - LV DIASTOLIC VOL/BSA (35-75): 35.1 ML/M^2
BH CV ECHO MEAS - LV MASS(C)D: 383.7 GRAMS
BH CV ECHO MEAS - LV MASS(C)DI: 146.3 GRAMS/M^2
BH CV ECHO MEAS - LV MEAN PG: 1 MMHG
BH CV ECHO MEAS - LV SYSTOLIC VOL/BSA (12-30): 16.8 ML/M^2
BH CV ECHO MEAS - LV V1 MAX: 69 CM/SEC
BH CV ECHO MEAS - LV V1 MEAN: 41.1 CM/SEC
BH CV ECHO MEAS - LV V1 VTI: 12.7 CM
BH CV ECHO MEAS - LVIDD: 5.6 CM
BH CV ECHO MEAS - LVIDS: 4.8 CM
BH CV ECHO MEAS - LVLD AP2: 7.3 CM
BH CV ECHO MEAS - LVLD AP4: 7.6 CM
BH CV ECHO MEAS - LVLS AP2: 6 CM
BH CV ECHO MEAS - LVLS AP4: 6.7 CM
BH CV ECHO MEAS - LVOT AREA (M): 4.5 CM^2
BH CV ECHO MEAS - LVOT AREA: 4.5 CM^2
BH CV ECHO MEAS - LVOT DIAM: 2.4 CM
BH CV ECHO MEAS - LVPWD: 1.5 CM
BH CV ECHO MEAS - MED PEAK E' VEL: 10 CM/SEC
BH CV ECHO MEAS - MR MAX PG: 53 MMHG
BH CV ECHO MEAS - MR MAX VEL: 364 CM/SEC
BH CV ECHO MEAS - MV DEC SLOPE: 812 CM/SEC^2
BH CV ECHO MEAS - MV DEC TIME: 136 SEC
BH CV ECHO MEAS - MV E MAX VEL: 74 CM/SEC
BH CV ECHO MEAS - MV MEAN PG: 2 MMHG
BH CV ECHO MEAS - MV P1/2T MAX VEL: 129 CM/SEC
BH CV ECHO MEAS - MV P1/2T: 46.5 MSEC
BH CV ECHO MEAS - MV V2 MEAN: 67.7 CM/SEC
BH CV ECHO MEAS - MV V2 VTI: 24.9 CM
BH CV ECHO MEAS - MVA P1/2T LCG: 1.7 CM^2
BH CV ECHO MEAS - MVA(P1/2T): 4.7 CM^2
BH CV ECHO MEAS - MVA(VTI): 2.3 CM^2
BH CV ECHO MEAS - PA ACC SLOPE: 40.6 CM/SEC^2
BH CV ECHO MEAS - PA ACC TIME: 0.13 SEC
BH CV ECHO MEAS - PA MAX PG: 3.8 MMHG
BH CV ECHO MEAS - PA PR(ACCEL): 18.7 MMHG
BH CV ECHO MEAS - PA V2 MAX: 97.7 CM/SEC
BH CV ECHO MEAS - PULM DIAS VEL: 33.9 CM/SEC
BH CV ECHO MEAS - PULM S/D: 1.4
BH CV ECHO MEAS - PULM SYS VEL: 49 CM/SEC
BH CV ECHO MEAS - QP/QS: 1.4
BH CV ECHO MEAS - RAP SYSTOLE: 8 MMHG
BH CV ECHO MEAS - RV MEAN PG: 1 MMHG
BH CV ECHO MEAS - RV V1 MEAN: 42 CM/SEC
BH CV ECHO MEAS - RV V1 VTI: 14.7 CM
BH CV ECHO MEAS - RVOT AREA: 5.3 CM^2
BH CV ECHO MEAS - RVOT DIAM: 2.6 CM
BH CV ECHO MEAS - RVSP: 43 MMHG
BH CV ECHO MEAS - SI(AO): 50.1 ML/M^2
BH CV ECHO MEAS - SI(CUBED): 24.8 ML/M^2
BH CV ECHO MEAS - SI(LVOT): 21.9 ML/M^2
BH CV ECHO MEAS - SI(MOD-SP2): 11.4 ML/M^2
BH CV ECHO MEAS - SI(MOD-SP4): 18.3 ML/M^2
BH CV ECHO MEAS - SI(TEICH): 17.6 ML/M^2
BH CV ECHO MEAS - SV(AO): 131.4 ML
BH CV ECHO MEAS - SV(CUBED): 65 ML
BH CV ECHO MEAS - SV(LVOT): 57.5 ML
BH CV ECHO MEAS - SV(MOD-SP2): 30 ML
BH CV ECHO MEAS - SV(MOD-SP4): 48 ML
BH CV ECHO MEAS - SV(RVOT): 78 ML
BH CV ECHO MEAS - SV(TEICH): 46.1 ML
BH CV ECHO MEAS - TAPSE (>1.6): 1.6 CM2
BH CV ECHO MEAS - TR MAX VEL: 296 CM/SEC
BH CV XLRA - RV BASE: 4.2 CM
BH CV XLRA - TDI S': 12 CM/SEC
E/E' RATIO: 6
LEFT ATRIUM VOLUME INDEX: 27 ML/M2

## 2017-01-24 PROCEDURE — 93005 ELECTROCARDIOGRAM TRACING: CPT | Performed by: NURSE PRACTITIONER

## 2017-01-24 PROCEDURE — 25010000002 PROPOFOL 10 MG/ML EMULSION: Performed by: NURSE ANESTHETIST, CERTIFIED REGISTERED

## 2017-01-24 PROCEDURE — C8929 TTE W OR WO FOL WCON,DOPPLER: HCPCS

## 2017-01-24 PROCEDURE — 25010000002 PERFLUTREN (DEFINITY) 8.476 MG IN SODIUM CHLORIDE 10 ML INJECTION: Performed by: INTERNAL MEDICINE

## 2017-01-24 PROCEDURE — G0378 HOSPITAL OBSERVATION PER HR: HCPCS

## 2017-01-24 PROCEDURE — 93010 ELECTROCARDIOGRAM REPORT: CPT | Performed by: INTERNAL MEDICINE

## 2017-01-24 PROCEDURE — 93306 TTE W/DOPPLER COMPLETE: CPT | Performed by: INTERNAL MEDICINE

## 2017-01-24 RX ORDER — DABIGATRAN ETEXILATE 150 MG/1
150 CAPSULE ORAL 2 TIMES DAILY
Qty: 30 CAPSULE | Refills: 0 | COMMUNITY
Start: 2017-01-24 | End: 2017-01-25 | Stop reason: SDUPTHER

## 2017-01-24 RX ORDER — AMIODARONE HYDROCHLORIDE 200 MG/1
400 TABLET ORAL EVERY 12 HOURS SCHEDULED
Status: DISCONTINUED | OUTPATIENT
Start: 2017-01-24 | End: 2017-01-25 | Stop reason: HOSPADM

## 2017-01-24 RX ORDER — PROPOFOL 10 MG/ML
VIAL (ML) INTRAVENOUS AS NEEDED
Status: DISCONTINUED | OUTPATIENT
Start: 2017-01-24 | End: 2017-01-24 | Stop reason: SURG

## 2017-01-24 RX ORDER — SODIUM CHLORIDE 0.9 % (FLUSH) 0.9 %
1-10 SYRINGE (ML) INJECTION AS NEEDED
Status: DISCONTINUED | OUTPATIENT
Start: 2017-01-24 | End: 2017-01-25 | Stop reason: HOSPADM

## 2017-01-24 RX ORDER — SODIUM CHLORIDE, SODIUM LACTATE, POTASSIUM CHLORIDE, CALCIUM CHLORIDE 600; 310; 30; 20 MG/100ML; MG/100ML; MG/100ML; MG/100ML
9 INJECTION, SOLUTION INTRAVENOUS CONTINUOUS
Status: DISCONTINUED | OUTPATIENT
Start: 2017-01-24 | End: 2017-01-25 | Stop reason: HOSPADM

## 2017-01-24 RX ORDER — DABIGATRAN ETEXILATE 150 MG/1
150 CAPSULE ORAL 2 TIMES DAILY
Status: DISCONTINUED | OUTPATIENT
Start: 2017-01-24 | End: 2017-01-25 | Stop reason: HOSPADM

## 2017-01-24 RX ADMIN — DABIGATRAN ETEXILATE MESYLATE 150 MG: 150 CAPSULE ORAL at 08:39

## 2017-01-24 RX ADMIN — ESCITALOPRAM 10 MG: 10 TABLET, FILM COATED ORAL at 08:39

## 2017-01-24 RX ADMIN — LEVOTHYROXINE SODIUM 150 MCG: 150 TABLET ORAL at 08:38

## 2017-01-24 RX ADMIN — PROPOFOL 40 MG: 10 INJECTION, EMULSION INTRAVENOUS at 11:37

## 2017-01-24 RX ADMIN — PROPOFOL 100 MG: 10 INJECTION, EMULSION INTRAVENOUS at 11:33

## 2017-01-24 RX ADMIN — CARVEDILOL 12.5 MG: 12.5 TABLET, FILM COATED ORAL at 08:40

## 2017-01-24 RX ADMIN — AMIODARONE HYDROCHLORIDE 400 MG: 200 TABLET ORAL at 15:37

## 2017-01-24 RX ADMIN — TOPIRAMATE 50 MG: 50 TABLET, FILM COATED ORAL at 08:39

## 2017-01-24 RX ADMIN — ACETAMINOPHEN 650 MG: 325 TABLET ORAL at 08:44

## 2017-01-24 RX ADMIN — CARVEDILOL 12.5 MG: 12.5 TABLET, FILM COATED ORAL at 17:02

## 2017-01-24 RX ADMIN — SILVER SULFADIAZINE: 10 CREAM TOPICAL at 12:40

## 2017-01-24 RX ADMIN — PERFLUTREN 4 ML: 6.52 INJECTION, SUSPENSION INTRAVENOUS at 08:00

## 2017-01-24 RX ADMIN — ACETAMINOPHEN 650 MG: 325 TABLET ORAL at 17:06

## 2017-01-24 RX ADMIN — ASPIRIN 81 MG: 81 TABLET, CHEWABLE ORAL at 08:39

## 2017-01-24 RX ADMIN — PANTOPRAZOLE SODIUM 40 MG: 40 TABLET, DELAYED RELEASE ORAL at 08:39

## 2017-01-24 RX ADMIN — PROPOFOL 20 MG: 10 INJECTION, EMULSION INTRAVENOUS at 11:41

## 2017-01-24 RX ADMIN — AMIODARONE HYDROCHLORIDE 400 MG: 200 TABLET ORAL at 23:55

## 2017-01-24 RX ADMIN — FUROSEMIDE 20 MG: 20 TABLET ORAL at 08:39

## 2017-01-24 RX ADMIN — DABIGATRAN ETEXILATE MESYLATE 150 MG: 150 CAPSULE ORAL at 17:02

## 2017-01-24 RX ADMIN — IRBESARTAN 300 MG: 150 TABLET ORAL at 08:39

## 2017-01-24 NOTE — PLAN OF CARE
Problem: Patient Care Overview (Adult)  Goal: Plan of Care Review  Outcome: Ongoing (interventions implemented as appropriate)    01/24/17 1448   Coping/Psychosocial Response Interventions   Plan Of Care Reviewed With patient   Patient Care Overview   Progress no change   Outcome Evaluation   Outcome Summary/Follow up Plan Patient back in NSR, was cardioverted today, amioadrone PO started, will be discharged in am, vss, will continue to monitor        Goal: Adult Individualization and Mutuality  Outcome: Ongoing (interventions implemented as appropriate)  Goal: Discharge Needs Assessment  Outcome: Ongoing (interventions implemented as appropriate)    01/24/17 1448   Discharge Needs Assessment   Concerns To Be Addressed denies needs/concerns at this time   Equipment Needed After Discharge none   Discharge Disposition still a patient   Self-Care   Equipment Currently Used at Home none   Living Environment   Transportation Available car;family or friend will provide   Current Health   Anticipated Changes Related to Illness none         Problem: Arrhythmia/Dysrhythmia (Symptomatic) (Adult)  Goal: Signs and Symptoms of Listed Potential Problems Will be Absent or Manageable (Arrhythmia/Dysrhythmia)  Outcome: Ongoing (interventions implemented as appropriate)

## 2017-01-24 NOTE — PLAN OF CARE
Problem: Patient Care Overview (Adult)  Goal: Plan of Care Review  Outcome: Ongoing (interventions implemented as appropriate)    01/24/17 0720   Coping/Psychosocial Response Interventions   Plan Of Care Reviewed With patient   Patient Care Overview   Progress no change   Outcome Evaluation   Outcome Summary/Follow up Plan controlled afib in the 70's/ amiodarone drip overnight, asymptomatic. for cardioversion today         Problem: Arrhythmia/Dysrhythmia (Symptomatic) (Adult)  Goal: Signs and Symptoms of Listed Potential Problems Will be Absent or Manageable (Arrhythmia/Dysrhythmia)  Outcome: Ongoing (interventions implemented as appropriate)

## 2017-01-24 NOTE — ANESTHESIA POSTPROCEDURE EVALUATION
Patient: Anne Gentile    Procedure Summary     Date Anesthesia Start Anesthesia Stop Room / Location    01/24/17 1126 1153 [facility] CATH LAB       Procedure Diagnosis Scheduled Providers Provider    CARDIOVERSION EXTERNAL Atrial fibrillation, unspecified type  (afib)  Larry Pedroza MD          Anesthesia Type: MAC  Last vitals  BP (!) 125/105 (01/24/17 1153)    Temp      Pulse 64 (01/24/17 1153)   Resp 20 (01/24/17 1153)    SpO2 95 % (01/24/17 1153)      Post Anesthesia Care and Evaluation    Patient location during evaluation: bedside  Patient participation: complete - patient participated  Level of consciousness: awake  Pain management: adequate  Airway patency: patent  Anesthetic complications: No anesthetic complications    Cardiovascular status: acceptable  Respiratory status: acceptable  Hydration status: acceptable

## 2017-01-24 NOTE — ANESTHESIA PREPROCEDURE EVALUATION
Anesthesia Evaluation      No history of anesthetic complications   Airway   Mallampati: III  no difficulty expected  Dental - normal exam     Pulmonary - normal exam   (+) hx of smoking (former),   (-) COPD, asthma, sleep apnea    PE comment: nonlabored  Cardiovascular   (+) hypertension, dysrhythmias Atrial Fib, angina, CHF,   (-) valvular problems/murmurs, past MI, CAD    Rhythm: irregular  Rate: abnormal    Neuro/Psych  (+) headaches, psychiatric history Depression,    (-) seizures, TIA, CVA  GI/Hepatic/Renal/Endo    (+) morbid obesity, GERD, hypothyroidism,   (-) liver disease, renal disease, diabetes, hyperthyroidism    Musculoskeletal (-) negative ROS    Abdominal    Substance History      OB/GYN          Other                             Anesthesia Plan    ASA 3     MAC     Anesthetic plan and risks discussed with patient.

## 2017-01-24 NOTE — PROGRESS NOTES
Discharge Planning Assessment  [facility]     Patient Name: Anne Gentile  MRN: [id number]  Today's Date: 1/24/2017    Admit Date: 1/23/2017          Discharge Needs Assessment       01/24/17 1349    Living Environment    Lives With child(jamison), adult;friend(s)    Living Arrangements house    Quality Of Family Relationships supportive;helpful;involved    Discharge Needs Assessment    Concerns To Be Addressed denies needs/concerns at this time    Readmission Within The Last 30 Days no previous admission in last 30 days    Anticipated Changes Related to Illness none    Equipment Currently Used at Home none    Equipment Needed After Discharge none    Transportation Available family or friend will provide;car    Discharge Disposition still a patient            Discharge Plan       01/24/17 1349    Case Management/Social Work Plan    Plan Home - denies needs    Additional Comments Met with pt at bedside- verified info on facesheet. Pt lives with her friend and adult son and is IADL's. She works and uses no DME. She has not used HH or been in skilled care. Pt plans to return home at discharge and currently denies CCP needs. CCP will continue to follow and assist as needed.............ANGE Treadwell ,CCP        Discharge Placement     No information found                Demographic Summary       01/24/17 1348    Referral Information    Admission Type observation    Arrived From still a patient    Reason For Consult discharge planning    Record Reviewed medical record    Contact Information    Permission Granted to Share Information With family/designee    Comments friend Malcom Cardosoon            Functional Status       01/24/17 1349    Functional Status Current    Ambulation 0-->independent    Transferring 0-->independent    Toileting 0-->independent    Bathing 0-->independent    Dressing 0-->independent    Eating 0-->independent    Functional Status Prior    Ambulation 0-->independent    Transferring 0-->independent     Toileting 0-->independent    Bathing 0-->independent    Dressing 0-->independent    Eating 0-->independent            Psychosocial     None            Abuse/Neglect     None            Legal     None            Substance Abuse     None            Patient Forms     None          Angel Becerra RN

## 2017-01-25 VITALS
HEART RATE: 74 BPM | SYSTOLIC BLOOD PRESSURE: 130 MMHG | HEIGHT: 67 IN | DIASTOLIC BLOOD PRESSURE: 88 MMHG | TEMPERATURE: 97.6 F | OXYGEN SATURATION: 99 % | WEIGHT: 293 LBS | BODY MASS INDEX: 45.99 KG/M2 | RESPIRATION RATE: 16 BRPM

## 2017-01-25 PROCEDURE — 93010 ELECTROCARDIOGRAM REPORT: CPT | Performed by: INTERNAL MEDICINE

## 2017-01-25 PROCEDURE — 99217 PR OBSERVATION CARE DISCHARGE MANAGEMENT: CPT | Performed by: INTERNAL MEDICINE

## 2017-01-25 PROCEDURE — 93005 ELECTROCARDIOGRAM TRACING: CPT | Performed by: NURSE PRACTITIONER

## 2017-01-25 PROCEDURE — G0378 HOSPITAL OBSERVATION PER HR: HCPCS

## 2017-01-25 RX ORDER — AMIODARONE HYDROCHLORIDE 400 MG/1
200 TABLET ORAL EVERY 12 HOURS SCHEDULED
Qty: 90 TABLET | Refills: 6 | Status: SHIPPED | OUTPATIENT
Start: 2017-01-25 | End: 2017-01-26 | Stop reason: ALTCHOICE

## 2017-01-25 RX ORDER — DABIGATRAN ETEXILATE 150 MG/1
150 CAPSULE ORAL 2 TIMES DAILY
Qty: 30 CAPSULE | Refills: 0 | COMMUNITY
Start: 2017-01-25 | End: 2017-08-07 | Stop reason: SDUPTHER

## 2017-01-25 RX ADMIN — ASPIRIN 81 MG: 81 TABLET, CHEWABLE ORAL at 08:20

## 2017-01-25 RX ADMIN — DABIGATRAN ETEXILATE MESYLATE 150 MG: 150 CAPSULE ORAL at 08:19

## 2017-01-25 RX ADMIN — ESCITALOPRAM 10 MG: 10 TABLET, FILM COATED ORAL at 08:20

## 2017-01-25 RX ADMIN — LEVOTHYROXINE SODIUM 150 MCG: 150 TABLET ORAL at 08:19

## 2017-01-25 RX ADMIN — CARVEDILOL 12.5 MG: 12.5 TABLET, FILM COATED ORAL at 08:20

## 2017-01-25 RX ADMIN — FUROSEMIDE 20 MG: 20 TABLET ORAL at 08:19

## 2017-01-25 RX ADMIN — IRBESARTAN 300 MG: 150 TABLET ORAL at 08:19

## 2017-01-25 RX ADMIN — TOPIRAMATE 50 MG: 50 TABLET, FILM COATED ORAL at 08:19

## 2017-01-25 RX ADMIN — ACETAMINOPHEN 650 MG: 325 TABLET ORAL at 10:16

## 2017-01-25 RX ADMIN — PANTOPRAZOLE SODIUM 40 MG: 40 TABLET, DELAYED RELEASE ORAL at 05:59

## 2017-01-25 RX ADMIN — AMIODARONE HYDROCHLORIDE 400 MG: 200 TABLET ORAL at 08:19

## 2017-01-25 NOTE — DISCHARGE SUMMARY
Date of Discharge:  1/25/2017    Discharge Diagnosis: atrial fibrillation    Presenting Problem/History of Present Illness  A-fib [I48.91]        Hospital Course  Patient is a 56 y.o. female presented with atrial fibrillation had a cardioversion 6 months ago.      Procedures Performed     fter anticoagulation for more than a month patient underwent successful cardioversion without any problems and complications    Consults:   Consults     No orders found from 12/25/2016 to 1/24/2017.          Pertinent Test Results:     Ejection Fraction  No results found for: EF    Echo EF Estimated  Lab Results   Component Value Date    ECHOEFEST 35 03/23/2016       Nuclear Stress Ejection Fraction  No components found for: NUCEF    Cath Ejection Fraction Quantitative  No results found for: CATHEF    Condition on Discharge:  stable    Physical Exam at Discharge    Vital Signs  Temp:  [97.5 °F (36.4 °C)-97.8 °F (36.6 °C)] 97.6 °F (36.4 °C)  Heart Rate:  [59-76] 74  Resp:  [16-18] 16  BP: (130-158)/(68-88) 130/88    Physical Exam:     General Appearance:    Alert, cooperative, in no acute distress   Head:    Normocephalic, without obvious abnormality, atraumatic   Eyes:            Lids and lashes normal, conjunctivae and sclerae normal, no   icterus, no pallor, corneas clear, PERRLA   Ears:    Ears appear intact with no abnormalities noted   Throat:   No oral lesions, no thrush, oral mucosa moist   Neck:   No adenopathy, supple, trachea midline, no thyromegaly, no     carotid bruit, no JVD   Back:     No kyphosis present, no scoliosis present, no skin lesions,       erythema or scars, no tenderness to percussion or                   palpation,   range of motion normal   Lungs:     Clear to auscultation,respirations regular, even and                   unlabored    Heart:    Regular rhythm and normal rate, normal S1 and S2, no            murmur, no gallop, no rub, no click   Breast Exam:    Deferred   Abdomen:     Normal bowel  sounds, no masses, no organomegaly, soft        non-tender, non-distended, no guarding, no rebound                 tenderness   Genitalia:    Deferred   Extremities:   Moves all extremities well, no edema, no cyanosis, no              redness   Pulses:   Pulses palpable and equal bilaterally   Skin:   No bleeding, bruising or rash   Lymph nodes:   No palpable adenopathy   Neurologic:   Cranial nerves 2 - 12 grossly intact, sensation intact, DTR        present and equal bilaterally       Discharge Disposition  Home or Self Care    Discharge Medications   Anne Gentile   Home Medication Instructions KAVIN:[id number]    Printed on:01/25/17 1213   Medication Information                      amiodarone (PACERONE) 400 MG tablet  Take 0.5 tablets by mouth Every 12 (Twelve) Hours. 200 mg 3 times a day for one week 200 mg twice a day for one week then 200 mg daily             aspirin 81 MG chewable tablet  Chew 81 mg Daily.             azelastine (ASTELIN) 0.1 % nasal spray  1-2 sprays into each nostril.             carvedilol (COREG) 12.5 MG tablet  Take 12.5 mg by mouth 2 (Two) Times a Day With Meals.             dabigatran etexilate (PRADAXA) 150 MG capsu  Take 1 capsule by mouth 2 (Two) Times a Day.             escitalopram (LEXAPRO) 10 MG tablet  TAKE ONE TABLET BY MOUTH DAILY             fluticasone (FLONASE) 50 MCG/ACT nasal spray  1 spray into each nostril.             furosemide (LASIX) 20 MG tablet  TAKE ONE TABLET BY MOUTH DAILY             irbesartan (AVAPRO) 300 MG tablet  TAKE ONE TABLET BY MOUTH DAILY             levothyroxine (SYNTHROID, LEVOTHROID) 150 MCG tablet  Take 150 mcg by mouth Daily.             nitroglycerin (NITROSTAT) 0.4 MG SL tablet  1 under the tongue as needed for angina, may repeat q5mins for up three doses             omeprazole (PriLOSEC) 20 MG capsule  Take 40 mg by mouth.             topiramate (TOPAMAX) 50 MG tablet  TAKE ONE TABLET BY MOUTH DAILY                 Discharge Diet:      Activity at Discharge:     Follow-up Appointments  No future appointments.      Test Results Pending at Discharge       Filemon Kumari MD  01/25/17  12:13 PM    Time:

## 2017-01-26 RX ORDER — AMIODARONE HYDROCHLORIDE 200 MG/1
200 TABLET ORAL DAILY
Qty: 30 TABLET | Refills: 5 | Status: SHIPPED | OUTPATIENT
Start: 2017-01-26 | End: 2017-12-13

## 2017-01-30 PROBLEM — Z79.01 ANTICOAGULATED: Status: ACTIVE | Noted: 2017-01-30

## 2017-01-30 PROBLEM — Z92.29 H/O AMIODARONE THERAPY: Status: ACTIVE | Noted: 2017-01-30

## 2017-02-15 ENCOUNTER — OFFICE VISIT (OUTPATIENT)
Dept: CARDIOLOGY | Facility: CLINIC | Age: 57
End: 2017-02-15

## 2017-02-15 VITALS
WEIGHT: 293 LBS | SYSTOLIC BLOOD PRESSURE: 148 MMHG | HEIGHT: 67 IN | DIASTOLIC BLOOD PRESSURE: 72 MMHG | HEART RATE: 63 BPM | BODY MASS INDEX: 45.99 KG/M2

## 2017-02-15 DIAGNOSIS — Z79.01 ANTICOAGULATED: ICD-10-CM

## 2017-02-15 DIAGNOSIS — I48.0 PAROXYSMAL ATRIAL FIBRILLATION (HCC): ICD-10-CM

## 2017-02-15 DIAGNOSIS — I10 ESSENTIAL HYPERTENSION: Primary | ICD-10-CM

## 2017-02-15 DIAGNOSIS — Z92.29 H/O AMIODARONE THERAPY: ICD-10-CM

## 2017-02-15 DIAGNOSIS — E66.01 MORBID OBESITY DUE TO EXCESS CALORIES (HCC): ICD-10-CM

## 2017-02-15 PROCEDURE — 93000 ELECTROCARDIOGRAM COMPLETE: CPT | Performed by: INTERNAL MEDICINE

## 2017-02-15 PROCEDURE — 99213 OFFICE O/P EST LOW 20 MIN: CPT | Performed by: INTERNAL MEDICINE

## 2017-02-15 NOTE — PROGRESS NOTES
" Subjective:       Anne Gentile is a 56 y.o. female who here for follow up    CC  Post cardioversion follow-up  HPI  56-year-old female with known history of atrial fibrillation, underwent cardioversion remains in normal sinus rhythm has markedly improved since the cardioversion approximately one month ago     Problem List Items Addressed This Visit        Cardiovascular and Mediastinum    Hypertension - Primary    A-fib       Other    H/O amiodarone therapy    Anticoagulated        Previous treatments/evaluations include: ASA and beta blocker. Cardiac risk factors: advanced age (older than 55 for men, 65 for women).    The following portions of the patient's history were reviewed and updated as appropriate: allergies, current medications, past family history, past medical history, past social history, past surgical history and problem list.    Past Medical History   Diagnosis Date   • Arthritis    • Atrial fibrillation    • CHF (congestive heart failure)    • Depression    • Disease of thyroid gland    • Hypertension     reports that she quit smoking about 5 years ago. She has a 30.00 pack-year smoking history. She has never used smokeless tobacco. She reports that she does not drink alcohol or use illicit drugs.  Family History   Problem Relation Age of Onset   • Cancer Mother    • COPD Father    • Heart disease Father    • Hypertension Father        Review of Systems  Constitutional: No wt loss, fever, fatigue  Gastrointestinal: No nausea, abdominal pain  Behavioral/Psych: No insomnia or anxiety   Cardiovascular no chest pains or tightness in chest  Objective:       Physical Exam             Physical Exam  Visit Vitals   • /72   • Pulse 63   • Ht 67\" (170.2 cm)   • Wt (!) 367 lb (166 kg)   • BMI 57.48 kg/m2       General appearance: NAD, conversant   Eyes: anicteric sclerae, moist conjunctivae; no lid-lag; PERRLA   HENT: Atraumatic; oropharynx clear with moist mucous membranes and no mucosal " ulcerations;  normal hard and soft palate   Neck: Trachea midline; FROM, supple, no thyromegaly or lymphadenopathy   Lungs: CTA, with normal respiratory effort and no intercostal retractions   CV: S1-S2 regular, no murmurs, no rub, no gallop   Abdomen: Soft, non-tender; no masses or HSM   Extremities: No peripheral edema or extremity lymphadenopathy  Skin: Normal temperature, turgor and texture; no rash, ulcers or subcutaneous nodules   Psych: Appropriate affect, alert and oriented to person, place and time           Cardiographics  @  ECG 12 Lead  Date/Time: 2/15/2017 10:48 AM  Performed by: AZAM ANGEL  Authorized by: AZAM ANGEL   Comparison: compared with previous ECG   Comparison to previous ECG: nsr now  Rhythm: sinus rhythm  Clinical impression: normal ECG            Echocardiogram:        Current Outpatient Prescriptions:   •  amiodarone (PACERONE) 200 MG tablet, Take 1 tablet by mouth Daily., Disp: 30 tablet, Rfl: 5  •  amoxicillin-clavulanate (AUGMENTIN) 875-125 MG per tablet, Take 1 tablet by mouth Every 12 (Twelve) Hours., Disp: 14 tablet, Rfl: 0  •  aspirin 81 MG chewable tablet, Chew 81 mg Daily., Disp: , Rfl:   •  azelastine (ASTELIN) 0.1 % nasal spray, 1-2 sprays into each nostril., Disp: , Rfl:   •  carvedilol (COREG) 12.5 MG tablet, Take 12.5 mg by mouth 2 (Two) Times a Day With Meals., Disp: , Rfl:   •  dabigatran etexilate (PRADAXA) 150 MG capsu, Take 1 capsule by mouth 2 (Two) Times a Day., Disp: 30 capsule, Rfl: 0  •  escitalopram (LEXAPRO) 10 MG tablet, TAKE ONE TABLET BY MOUTH DAILY, Disp: , Rfl:   •  fluticasone (FLONASE) 50 MCG/ACT nasal spray, 1 spray into each nostril., Disp: , Rfl:   •  furosemide (LASIX) 20 MG tablet, TAKE ONE TABLET BY MOUTH DAILY, Disp: , Rfl:   •  HYDROcodone-acetaminophen (NORCO) 7.5-325 MG per tablet, Take 1 tablet by mouth Every 4 (Four) Hours As Needed for moderate pain (4-6)., Disp: 30 tablet, Rfl: 0  •  irbesartan (AVAPRO) 300 MG tablet,  TAKE ONE TABLET BY MOUTH DAILY, Disp: , Rfl:   •  levothyroxine (SYNTHROID, LEVOTHROID) 150 MCG tablet, Take 150 mcg by mouth Daily., Disp: , Rfl:   •  MethylPREDNISolone (MEDROL) 4 MG tablet, Take as directed on package instructions, Disp: 21 tablet, Rfl: 0  •  nitroglycerin (NITROSTAT) 0.4 MG SL tablet, 1 under the tongue as needed for angina, may repeat q5mins for up three doses, Disp: 100 tablet, Rfl: 11  •  omeprazole (PriLOSEC) 20 MG capsule, Take 40 mg by mouth., Disp: , Rfl:   •  topiramate (TOPAMAX) 50 MG tablet, TAKE ONE TABLET BY MOUTH DAILY, Disp: , Rfl:    Assessment:        Patient Active Problem List   Diagnosis   • Malignant hypertension   • Acute rhinosinusitis   • Knee pain   • Chest pain at rest   • Chest pain   • Chronic headache   • Congestion of respiratory tract   • Depression   • Gastroesophageal reflux disease without esophagitis   • Hypertension   • Hypothyroidism   • Headache   • Morbid obesity   • Other specified disorders of nose and paranasal sinuses   • Difficulty sleeping   • Unstable angina pectoris   • Atrial fibrillation   • A-fib   • H/O amiodarone therapy   • Anticoagulated               Plan:       post cardioversion no complications remains in normal sinus rhythm    Blood pressure remains stable    Pros and cons as well as indication of the anticoagulation has been explained to the patient in detail    There are no obvious complications at this stage    Risk of  the bleedings has been explained    Need for the regular blood workup and adjust the dose has been explained    Need for proper follow-up on anticoagulation also has been explained        ICD-10-CM ICD-9-CM   1. Essential hypertension I10 401.9   2. Anticoagulated Z79.01 V58.61   3. H/O amiodarone therapy Z92.29 V87.49   4. Paroxysmal atrial fibrillation I48.0 427.31     Anne Gentile IS ON AMIODARONE,   Significant side effects associated with this medication has been explained     Pros and cons of the medications  has been discussed, decision has been to continue the medication   6 months to yearly checkup for eye examination, thyroid function test, chest x-ray and liver function test has been advised    Patient understands well      Post cv stable    See in 3 months when amio will be reduced to 100 mg    COUNSELING:    Anne Yang was given to patient for the following topics: diagnostic results, risk factor reductions, impressions, risks and benefits of treatment options and importance of treatment compliance .       SMOKING COUNSELING:    Counseling given: Not Answered      EMR Dragon/Transcription disclaimer:   Much of this encounter note is an electronic transcription/translation of spoken language to printed text. The electronic translation of spoken language may permit erroneous, or at times, nonsensical words or phrases to be inadvertently transcribed; Although I have reviewed the note for such errors, some may still exist.

## 2017-05-17 ENCOUNTER — OFFICE VISIT (OUTPATIENT)
Dept: CARDIOLOGY | Facility: CLINIC | Age: 57
End: 2017-05-17

## 2017-05-17 VITALS
SYSTOLIC BLOOD PRESSURE: 128 MMHG | BODY MASS INDEX: 45.99 KG/M2 | HEIGHT: 67 IN | DIASTOLIC BLOOD PRESSURE: 79 MMHG | WEIGHT: 293 LBS | HEART RATE: 71 BPM

## 2017-05-17 DIAGNOSIS — Z79.899 ON AMIODARONE THERAPY: ICD-10-CM

## 2017-05-17 DIAGNOSIS — I48.0 PAROXYSMAL ATRIAL FIBRILLATION (HCC): Primary | ICD-10-CM

## 2017-05-17 DIAGNOSIS — I10 ESSENTIAL HYPERTENSION: ICD-10-CM

## 2017-05-17 PROCEDURE — 99213 OFFICE O/P EST LOW 20 MIN: CPT | Performed by: INTERNAL MEDICINE

## 2017-05-17 PROCEDURE — 93000 ELECTROCARDIOGRAM COMPLETE: CPT | Performed by: INTERNAL MEDICINE

## 2017-05-26 ENCOUNTER — HOSPITAL ENCOUNTER (OUTPATIENT)
Dept: GENERAL RADIOLOGY | Facility: HOSPITAL | Age: 57
Discharge: HOME | End: 2017-05-26
Attending: INTERNAL MEDICINE | Admitting: INTERNAL MEDICINE

## 2017-05-26 ENCOUNTER — LAB (OUTPATIENT)
Dept: LAB | Facility: HOSPITAL | Age: 57
End: 2017-05-26

## 2017-05-26 DIAGNOSIS — Z79.899 ON AMIODARONE THERAPY: ICD-10-CM

## 2017-05-26 DIAGNOSIS — I48.0 PAROXYSMAL ATRIAL FIBRILLATION (HCC): ICD-10-CM

## 2017-05-26 LAB
ALBUMIN SERPL-MCNC: 3.7 G/DL (ref 3.5–5.2)
ALBUMIN/GLOB SERPL: 1 G/DL
ALP SERPL-CCNC: 109 U/L (ref 39–117)
ALT SERPL W P-5'-P-CCNC: 28 U/L (ref 1–33)
ANION GAP SERPL CALCULATED.3IONS-SCNC: 10.4 MMOL/L
AST SERPL-CCNC: 19 U/L (ref 1–32)
BILIRUB SERPL-MCNC: 0.2 MG/DL (ref 0.1–1.2)
BUN BLD-MCNC: 19 MG/DL (ref 6–20)
BUN/CREAT SERPL: 20 (ref 7–25)
CALCIUM SPEC-SCNC: 9.4 MG/DL (ref 8.6–10.5)
CHLORIDE SERPL-SCNC: 101 MMOL/L (ref 98–107)
CHOLEST SERPL-MCNC: 286 MG/DL (ref 0–200)
CO2 SERPL-SCNC: 30.6 MMOL/L (ref 22–29)
CREAT BLD-MCNC: 0.95 MG/DL (ref 0.57–1)
GFR SERPL CREATININE-BSD FRML MDRD: 60 ML/MIN/1.73
GLOBULIN UR ELPH-MCNC: 3.8 GM/DL
GLUCOSE BLD-MCNC: 107 MG/DL (ref 65–99)
HDLC SERPL-MCNC: 45 MG/DL (ref 40–60)
LDLC SERPL CALC-MCNC: 214 MG/DL (ref 0–100)
LDLC/HDLC SERPL: 4.75 {RATIO}
POTASSIUM BLD-SCNC: 4.8 MMOL/L (ref 3.5–5.2)
PROT SERPL-MCNC: 7.5 G/DL (ref 6–8.5)
SODIUM BLD-SCNC: 142 MMOL/L (ref 136–145)
T-UPTAKE NFR SERPL: 1.17 TBI (ref 0.8–1.3)
T4 SERPL-MCNC: 6.68 MCG/DL (ref 4.5–11.7)
TRIGL SERPL-MCNC: 137 MG/DL (ref 0–150)
TSH SERPL DL<=0.05 MIU/L-ACNC: 9.72 MIU/ML (ref 0.27–4.2)
VLDLC SERPL-MCNC: 27.4 MG/DL (ref 5–40)

## 2017-05-26 PROCEDURE — 84436 ASSAY OF TOTAL THYROXINE: CPT

## 2017-05-26 PROCEDURE — 84479 ASSAY OF THYROID (T3 OR T4): CPT

## 2017-05-26 PROCEDURE — 80053 COMPREHEN METABOLIC PANEL: CPT

## 2017-05-26 PROCEDURE — 71020 HC CHEST PA AND LATERAL: CPT

## 2017-05-26 PROCEDURE — 36415 COLL VENOUS BLD VENIPUNCTURE: CPT

## 2017-05-26 PROCEDURE — 84443 ASSAY THYROID STIM HORMONE: CPT

## 2017-05-26 PROCEDURE — 80061 LIPID PANEL: CPT

## 2017-06-14 ENCOUNTER — OFFICE VISIT (OUTPATIENT)
Dept: CARDIOLOGY | Facility: CLINIC | Age: 57
End: 2017-06-14

## 2017-06-14 VITALS
HEIGHT: 67 IN | HEART RATE: 61 BPM | SYSTOLIC BLOOD PRESSURE: 146 MMHG | WEIGHT: 293 LBS | DIASTOLIC BLOOD PRESSURE: 88 MMHG | BODY MASS INDEX: 45.99 KG/M2

## 2017-06-14 DIAGNOSIS — I10 ESSENTIAL HYPERTENSION: ICD-10-CM

## 2017-06-14 DIAGNOSIS — Z92.29 H/O AMIODARONE THERAPY: ICD-10-CM

## 2017-06-14 DIAGNOSIS — Z79.01 ANTICOAGULATED: ICD-10-CM

## 2017-06-14 DIAGNOSIS — I48.0 PAROXYSMAL ATRIAL FIBRILLATION (HCC): Primary | ICD-10-CM

## 2017-06-14 PROCEDURE — 99213 OFFICE O/P EST LOW 20 MIN: CPT | Performed by: INTERNAL MEDICINE

## 2017-06-14 PROCEDURE — 93000 ELECTROCARDIOGRAM COMPLETE: CPT | Performed by: INTERNAL MEDICINE

## 2017-06-14 NOTE — PROGRESS NOTES
" Subjective:       Anne Gentile is a 57 y.o. female who here for follow up    CC  Follow-up for the hypertension and atrial fibrillation on anticoagulation  HPI  57-year-old white female with known history of benign essential arterial hypertension, atrial fibrillation as well as on amiodarone therapy on chronic anticoagulation here for the follow-up denies any chest pains or tightness in chest no heaviness with a pressure sensation no syncopal near-syncopal episodes no PND orthopnea     Problem List Items Addressed This Visit        Cardiovascular and Mediastinum    Hypertension    Atrial fibrillation - Primary    Relevant Orders    ECG 12 Lead       Other    H/O amiodarone therapy    Anticoagulated        .    The following portions of the patient's history were reviewed and updated as appropriate: allergies, current medications, past family history, past medical history, past social history, past surgical history and problem list.    Past Medical History:   Diagnosis Date   • Arthritis    • Atrial fibrillation    • CHF (congestive heart failure)    • Depression    • Disease of thyroid gland    • Hypertension     reports that she quit smoking about 6 years ago. She has a 30.00 pack-year smoking history. She has never used smokeless tobacco. She reports that she does not drink alcohol or use illicit drugs.  Family History   Problem Relation Age of Onset   • Cancer Mother    • COPD Father    • Heart disease Father    • Hypertension Father        Review of Systems  Constitutional: No wt loss, fever, fatigue  Gastrointestinal: No nausea, abdominal pain  Behavioral/Psych: No insomnia or anxiety   Cardiovascular No chest pains and tightness in chest  Objective:       Physical Exam             Physical Exam  /88  Pulse 61  Ht 67\" (170.2 cm)  Wt (!) 385 lb (175 kg)  BMI 60.3 kg/m2    General appearance: NAD, conversant   Eyes: anicteric sclerae, moist conjunctivae; no lid-lag; PERRLA   HENT: Atraumatic; " oropharynx clear with moist mucous membranes and no mucosal ulcerations;  normal hard and soft palate   Neck: Trachea midline; FROM, supple, no thyromegaly or lymphadenopathy   Lungs: CTA, with normal respiratory effort and no intercostal retractions   CV: S1-S2 regular, no murmurs, no rub, no gallop   Abdomen: Soft, non-tender; no masses or HSM   Extremities: No peripheral edema or extremity lymphadenopathy  Skin: Normal temperature, turgor and texture; no rash, ulcers or subcutaneous nodules   Psych: Appropriate affect, alert and oriented to person, place and time           Cardiographics  @  ECG 12 Lead  Date/Time: 6/14/2017 12:02 PM  Performed by: AZAM ANGEL  Authorized by: AZAM ANGEL   Comparison: compared with previous ECG   Similar to previous ECG  Rhythm: sinus rhythm  ST Segments: ST segments normal  T Waves: T waves normal  Clinical impression: normal ECG        HISTORY: Amiodarone therapy      FINDINGS: Two views of the chest are provided and compared to prior 2  view chest 11/07/2007.      FINDINGS: The cardiac silhouette is mildly enlarged. Mediastinal  contours appear unremarkable. There is some mild chronic interstitial  change within the lungs, not significantly changed from prior exams. No  new focal opacity or infiltrate is present. I see no pleural effusion or  pneumothorax.      This report was finalized on 5/30/2017 7:24 AM by Dr. Radhames Berry MD.        Echocardiogram:        Current Outpatient Prescriptions:   •  amiodarone (PACERONE) 200 MG tablet, Take 1 tablet by mouth Daily., Disp: 30 tablet, Rfl: 5  •  aspirin 81 MG chewable tablet, Chew 81 mg Daily., Disp: , Rfl:   •  carvedilol (COREG) 12.5 MG tablet, Take 12.5 mg by mouth 2 (Two) Times a Day With Meals., Disp: , Rfl:   •  dabigatran etexilate (PRADAXA) 150 MG capsu, Take 1 capsule by mouth 2 (Two) Times a Day., Disp: 30 capsule, Rfl: 0  •  escitalopram (LEXAPRO) 10 MG tablet, TAKE ONE TABLET BY MOUTH DAILY,  Disp: , Rfl:   •  furosemide (LASIX) 20 MG tablet, TAKE ONE TABLET BY MOUTH DAILY, Disp: , Rfl:   •  irbesartan (AVAPRO) 300 MG tablet, TAKE ONE TABLET BY MOUTH DAILY, Disp: , Rfl:   •  levothyroxine (SYNTHROID, LEVOTHROID) 150 MCG tablet, Take 150 mcg by mouth Daily., Disp: , Rfl:   •  nitroglycerin (NITROSTAT) 0.4 MG SL tablet, 1 under the tongue as needed for angina, may repeat q5mins for up three doses, Disp: 100 tablet, Rfl: 11  •  omeprazole (PriLOSEC) 20 MG capsule, Take 40 mg by mouth., Disp: , Rfl:   •  topiramate (TOPAMAX) 50 MG tablet, TAKE ONE TABLET BY MOUTH DAILY, Disp: , Rfl:    Assessment:        Patient Active Problem List   Diagnosis   • Malignant hypertension   • Acute rhinosinusitis   • Knee pain   • Chest pain at rest   • Chest pain   • Chronic headache   • Congestion of respiratory tract   • Depression   • Gastroesophageal reflux disease without esophagitis   • Hypertension   • Hypothyroidism   • Headache   • Morbid obesity   • Other specified disorders of nose and paranasal sinuses   • Difficulty sleeping   • Unstable angina pectoris   • Atrial fibrillation   • A-fib   • H/O amiodarone therapy   • Anticoagulated     TSH 0.270 - 4.200 mIU/mL 9.720 (H)   T Uptake 0.80 - 1.30 TBI 1.17   T4, Total 4.50 - 11.70 mcg/dL 6.68              Glucose 65 - 99 mg/dL 107 (H)   BUN 6 - 20 mg/dL 19   Creatinine 0.57 - 1.00 mg/dL 0.95   Sodium 136 - 145 mmol/L 142   Potassium 3.5 - 5.2 mmol/L 4.8   Chloride 98 - 107 mmol/L 101   CO2 22.0 - 29.0 mmol/L 30.6 (H)   Calcium 8.6 - 10.5 mg/dL 9.4   Total Protein 6.0 - 8.5 g/dL 7.5   Albumin 3.50 - 5.20 g/dL 3.70   ALT (SGPT) 1 - 33 U/L 28   AST (SGOT) 1 - 32 U/L 19   Alkaline Phosphatase 39 - 117 U/L 109   Total Bilirubin 0.1 - 1.2 mg/dL 0.2   eGFR Non African Amer >60 mL/min/1.73 60 (L)   Globulin gm/dL 3.8   A/G Ratio g/dL 1.0   BUN/Creatinine Ratio 7.0 - 25.0 20.0   Anion Gap mmol/L 10.4   Resulting Agency  [facility]      Specimen Collected: 05/26/17  8:07 AM    Last Resulted: 05/26/17  9:00 AM                Plan:            ICD-10-CM ICD-9-CM   1. Paroxysmal atrial fibrillation I48.0 427.31   2. Anticoagulated Z79.01 V58.61   3. H/O amiodarone therapy Z92.29 V87.49   4. Essential hypertension I10 401.9     1. Paroxysmal atrial fibrillation  Normal sinus rhythm  - ECG 12 Lead    2. Anticoagulated  Pros and cons as well as indication of the anticoagulation has been explained to the patient in detail    There are no obvious complications at this stage    Risk of  the bleedings has been explained    Need for the regular blood workup and adjust the dose has been explained    Need for proper follow-up on anticoagulation also has been explained      3. H/O amiodarone therapy  Anne Gentile IS ON AMIODARONE,   Significant side effects associated with this medication has been explained     Pros and cons of the medications has been discussed, decision has been to continue the medication   6 months to yearly checkup for eye examination, thyroid function test, chest x-ray and liver function test has been advised    Patient understands well      4. Essential hypertension  Blood pressure well-controlled     6 MONTHS WITH AMIO CHECK  COUNSELING:    Anne ReidsCounseling was given to patient for the following topics: diagnostic results, risk factor reductions, impressions, risks and benefits of treatment options and importance of treatment compliance .       SMOKING COUNSELING:    Counseling given: Not Answered      EMR Dragon/Transcription disclaimer:   Much of this encounter note is an electronic transcription/translation of spoken language to printed text. The electronic translation of spoken language may permit erroneous, or at times, nonsensical words or phrases to be inadvertently transcribed; Although I have reviewed the note for such errors, some may still exist.

## 2017-07-28 NOTE — PLAN OF CARE
Problem: Patient Care Overview (Adult)  Goal: Plan of Care Review  Outcome: Ongoing (interventions implemented as appropriate)    01/25/17 8701   Patient Care Overview   Progress improving   Outcome Evaluation   Outcome Summary/Follow up Plan Pt remains in NSR, no complaints during the night, appeared to sleep fair, probable d/c today             Unknown

## 2017-08-07 RX ORDER — DABIGATRAN ETEXILATE 150 MG/1
150 CAPSULE ORAL 2 TIMES DAILY
Qty: 48 CAPSULE | Refills: 0 | COMMUNITY
Start: 2017-08-07 | End: 2017-11-14 | Stop reason: SDUPTHER

## 2017-11-14 RX ORDER — DABIGATRAN ETEXILATE 150 MG/1
150 CAPSULE ORAL 2 TIMES DAILY
Qty: 48 CAPSULE | Refills: 0 | COMMUNITY
Start: 2017-11-14 | End: 2017-12-13 | Stop reason: SDUPTHER

## 2017-12-13 ENCOUNTER — OFFICE VISIT (OUTPATIENT)
Dept: CARDIOLOGY | Facility: CLINIC | Age: 57
End: 2017-12-13

## 2017-12-13 VITALS
HEIGHT: 67 IN | BODY MASS INDEX: 45.99 KG/M2 | HEART RATE: 79 BPM | DIASTOLIC BLOOD PRESSURE: 78 MMHG | SYSTOLIC BLOOD PRESSURE: 128 MMHG | WEIGHT: 293 LBS

## 2017-12-13 DIAGNOSIS — Z92.29 H/O AMIODARONE THERAPY: ICD-10-CM

## 2017-12-13 DIAGNOSIS — Z79.01 ANTICOAGULATED: ICD-10-CM

## 2017-12-13 DIAGNOSIS — I48.0 PAROXYSMAL ATRIAL FIBRILLATION (HCC): ICD-10-CM

## 2017-12-13 DIAGNOSIS — I10 ESSENTIAL HYPERTENSION: Primary | ICD-10-CM

## 2017-12-13 PROCEDURE — 99213 OFFICE O/P EST LOW 20 MIN: CPT | Performed by: INTERNAL MEDICINE

## 2017-12-13 PROCEDURE — 93000 ELECTROCARDIOGRAM COMPLETE: CPT | Performed by: INTERNAL MEDICINE

## 2017-12-13 RX ORDER — AMIODARONE HYDROCHLORIDE 200 MG/1
100 TABLET ORAL DAILY
Qty: 30 TABLET | Refills: 5 | Status: SHIPPED | OUTPATIENT
Start: 2017-12-13 | End: 2018-03-26 | Stop reason: SDUPTHER

## 2017-12-13 RX ORDER — DABIGATRAN ETEXILATE 150 MG/1
150 CAPSULE ORAL 2 TIMES DAILY
Qty: 48 CAPSULE | Refills: 0 | COMMUNITY
Start: 2017-12-13 | End: 2018-02-14 | Stop reason: SDUPTHER

## 2017-12-13 NOTE — PROGRESS NOTES
" Subjective:       Anne Gentile is a 57 y.o. female who here for follow up    CC  Follow-up for the hypertension and atrial fibrillation  HPI  57-year-old female with a history of atrial fibrillation, benign essential arterial hypertension on chronic anticoagulation here for the follow-up    Anne Gentile  here for follow up with no complaints of chest pain, sob, palpitation, syncope, near syncope  No side effects with current meds  No pnd, orthopnea       Problem List Items Addressed This Visit        Cardiovascular and Mediastinum    Hypertension - Primary    A-fib       Hematopoietic and Hemostatic    Anticoagulated       Other    H/O amiodarone therapy        .    The following portions of the patient's history were reviewed and updated as appropriate: allergies, current medications, past family history, past medical history, past social history, past surgical history and problem list.    Past Medical History:   Diagnosis Date   • Arthritis    • Atrial fibrillation    • CHF (congestive heart failure)    • Depression    • Disease of thyroid gland    • Hypertension     reports that she quit smoking about 6 years ago. She has a 30.00 pack-year smoking history. She has never used smokeless tobacco. She reports that she does not drink alcohol or use illicit drugs.  Family History   Problem Relation Age of Onset   • Cancer Mother    • COPD Father    • Heart disease Father    • Hypertension Father        Review of Systems  Constitutional: No wt loss, fever, fatigue  Gastrointestinal: No nausea, abdominal pain  Behavioral/Psych: No insomnia or anxiety   Cardiovascular No chest pains or tightness in chest  Objective:       Physical Exam             Physical Exam  /78  Pulse 79  Ht 170.2 cm (67\")  Wt (!) 174 kg (383 lb)  BMI 59.99 kg/m2    General appearance: NAD, conversant   Eyes: anicteric sclerae, moist conjunctivae; no lid-lag; PERRLA   HENT: Atraumatic; oropharynx clear with moist mucous membranes " and no mucosal ulcerations;  normal hard and soft palate   Neck: Trachea midline; FROM, supple, no thyromegaly or lymphadenopathy   Lungs: CTA, with normal respiratory effort and no intercostal retractions   CV: S1-S2 regular, no murmurs, no rub, no gallop   Abdomen: Soft, non-tender; no masses or HSM   Extremities: No peripheral edema or extremity lymphadenopathy  Skin: Normal temperature, turgor and texture; no rash, ulcers or subcutaneous nodules   Psych: Appropriate affect, alert and oriented to person, place and time           Cardiographics  @  ECG 12 Lead  Date/Time: 12/13/2017 10:04 AM  Performed by: AZAM ANGEL  Authorized by: AZAM ANGEL   Comparison: compared with previous ECG   Similar to previous ECG  Rhythm: sinus rhythm  ST Flattening: all  Clinical impression: non-specific ECG            Echocardiogram:        Current Outpatient Prescriptions:   •  amiodarone (PACERONE) 200 MG tablet, Take 1 tablet by mouth Daily., Disp: 30 tablet, Rfl: 5  •  aspirin 81 MG chewable tablet, Chew 81 mg Daily., Disp: , Rfl:   •  carvedilol (COREG) 12.5 MG tablet, Take 12.5 mg by mouth 2 (Two) Times a Day With Meals., Disp: , Rfl:   •  dabigatran etexilate (PRADAXA) 150 MG capsu, Take 1 capsule by mouth 2 (Two) Times a Day., Disp: 48 capsule, Rfl: 0  •  escitalopram (LEXAPRO) 10 MG tablet, TAKE ONE TABLET BY MOUTH DAILY, Disp: , Rfl:   •  furosemide (LASIX) 20 MG tablet, TAKE ONE TABLET BY MOUTH DAILY, Disp: , Rfl:   •  irbesartan (AVAPRO) 300 MG tablet, TAKE ONE TABLET BY MOUTH DAILY, Disp: , Rfl:   •  levothyroxine (SYNTHROID, LEVOTHROID) 150 MCG tablet, Take 150 mcg by mouth Daily., Disp: , Rfl:   •  omeprazole (PriLOSEC) 20 MG capsule, Take 40 mg by mouth., Disp: , Rfl:   •  topiramate (TOPAMAX) 50 MG tablet, TAKE ONE TABLET BY MOUTH DAILY, Disp: , Rfl:   •  nitroglycerin (NITROSTAT) 0.4 MG SL tablet, 1 under the tongue as needed for angina, may repeat q5mins for up three doses, Disp: 100 tablet,  Rfl: 11   Assessment:        Patient Active Problem List   Diagnosis   • Malignant hypertension   • Acute rhinosinusitis   • Knee pain   • Chest pain at rest   • Chest pain   • Chronic headache   • Congestion of respiratory tract   • Depression   • Gastroesophageal reflux disease without esophagitis   • Hypertension   • Hypothyroidism   • Headache   • Morbid obesity   • Other specified disorders of nose and paranasal sinuses   • Difficulty sleeping   • Unstable angina pectoris   • Atrial fibrillation   • A-fib   • H/O amiodarone therapy   • Anticoagulated               Plan:            ICD-10-CM ICD-9-CM   1. Essential hypertension I10 401.9   2. Paroxysmal atrial fibrillation I48.0 427.31   3. H/O amiodarone therapy Z92.29 V87.49     1. Essential hypertension  Blood pressure under control    2. Paroxysmal atrial fibrillation  Now in normal sinus rhythm    3. H/O amiodarone therapy  Anne Gentile IS ON AMIODARONE,   Significant side effects associated with this medication has been explained     Pros and cons of the medications has been discussed, decision has been to continue the medication   6 months to yearly checkup for eye examination, thyroid function test, chest x-ray and liver function test has been advised    Patient understands well      4. Anticoagulated  Pros and cons as well as indication of the anticoagulation has been explained to the patient in detail    There are no obvious complications at this stage    Risk of  the bleedings has been explained    Need for the regular blood workup and adjust the dose has been explained    Need for proper follow-up on anticoagulation also has been explained    cv stable      COUNSELING:    Anne ReidRichard was given to patient for the following topics: diagnostic results, risk factor reductions, impressions, risks and benefits of treatment options and importance of treatment compliance .       SMOKING COUNSELING:    Counseling given: Not  Answered      EMR Dragon/Transcription disclaimer:   Much of this encounter note is an electronic transcription/translation of spoken language to printed text. The electronic translation of spoken language may permit erroneous, or at times, nonsensical words or phrases to be inadvertently transcribed; Although I have reviewed the note for such errors, some may still exist.

## 2018-01-03 ENCOUNTER — TELEPHONE (OUTPATIENT)
Dept: ORTHOPEDIC SURGERY | Facility: CLINIC | Age: 58
End: 2018-01-03

## 2018-01-03 NOTE — TELEPHONE ENCOUNTER
Patient has recently tried Pennsaid and it has helped her knees tremendously. She is asking for a Rx sent to her pharmacy. Please Advise.

## 2018-02-14 ENCOUNTER — TELEPHONE (OUTPATIENT)
Dept: CARDIOLOGY | Facility: CLINIC | Age: 58
End: 2018-02-14

## 2018-02-14 RX ORDER — DABIGATRAN ETEXILATE 150 MG/1
150 CAPSULE ORAL EVERY 12 HOURS SCHEDULED
Qty: 60 CAPSULE | Refills: 0 | COMMUNITY
Start: 2018-02-14 | End: 2018-06-28 | Stop reason: SDUPTHER

## 2018-03-26 RX ORDER — AMIODARONE HYDROCHLORIDE 200 MG/1
100 TABLET ORAL DAILY
Qty: 30 TABLET | Refills: 5 | Status: SHIPPED | OUTPATIENT
Start: 2018-03-26 | End: 2018-04-02 | Stop reason: SDUPTHER

## 2018-04-02 RX ORDER — AMIODARONE HYDROCHLORIDE 200 MG/1
100 TABLET ORAL DAILY
Qty: 30 TABLET | Refills: 5 | Status: SHIPPED | OUTPATIENT
Start: 2018-04-02 | End: 2020-01-27

## 2018-06-04 ENCOUNTER — TELEPHONE (OUTPATIENT)
Dept: CARDIOLOGY | Facility: CLINIC | Age: 58
End: 2018-06-04

## 2018-06-04 NOTE — TELEPHONE ENCOUNTER
Patient is requesting samples of Pradaxa 150mg BID.    Can be reached at [phone] when they are ready to be picked up.

## 2018-06-28 RX ORDER — DABIGATRAN ETEXILATE 150 MG/1
150 CAPSULE ORAL EVERY 12 HOURS SCHEDULED
Qty: 48 CAPSULE | Refills: 0 | COMMUNITY
Start: 2018-06-28 | End: 2018-07-31 | Stop reason: SDUPTHER

## 2018-07-31 RX ORDER — DABIGATRAN ETEXILATE 150 MG/1
150 CAPSULE ORAL EVERY 12 HOURS SCHEDULED
Qty: 24 CAPSULE | Refills: 0 | COMMUNITY
Start: 2018-07-31 | End: 2020-03-26 | Stop reason: SDUPTHER

## 2018-11-19 ENCOUNTER — TELEPHONE (OUTPATIENT)
Dept: CARDIOLOGY | Facility: CLINIC | Age: 58
End: 2018-11-19

## 2018-11-19 NOTE — TELEPHONE ENCOUNTER
Regarding: Non-Urgent Medical Question  Contact: [phone]  ----- Message from [employer], Generic sent at 11/16/2018 12:07 PM EST -----    I WOULD LIKE TO ASK ARNIE IF SHE HAS SAMPLES I COULD  FOR 150MG OF PRADAXA.    THANK YOU

## 2018-12-12 ENCOUNTER — OFFICE VISIT (OUTPATIENT)
Dept: CARDIOLOGY | Facility: CLINIC | Age: 58
End: 2018-12-12

## 2018-12-12 VITALS
HEIGHT: 67 IN | SYSTOLIC BLOOD PRESSURE: 151 MMHG | WEIGHT: 293 LBS | BODY MASS INDEX: 45.99 KG/M2 | HEART RATE: 67 BPM | DIASTOLIC BLOOD PRESSURE: 82 MMHG

## 2018-12-12 DIAGNOSIS — Z79.01 ANTICOAGULATED: ICD-10-CM

## 2018-12-12 DIAGNOSIS — Z79.899 ON AMIODARONE THERAPY: ICD-10-CM

## 2018-12-12 DIAGNOSIS — I48.0 PAROXYSMAL ATRIAL FIBRILLATION (HCC): ICD-10-CM

## 2018-12-12 DIAGNOSIS — I10 ESSENTIAL HYPERTENSION: Primary | ICD-10-CM

## 2018-12-12 PROCEDURE — 93000 ELECTROCARDIOGRAM COMPLETE: CPT | Performed by: NURSE PRACTITIONER

## 2018-12-12 PROCEDURE — 99213 OFFICE O/P EST LOW 20 MIN: CPT | Performed by: NURSE PRACTITIONER

## 2018-12-12 RX ORDER — CARVEDILOL 25 MG/1
TABLET ORAL
Qty: 60 TABLET | Refills: 5 | Status: SHIPPED | OUTPATIENT
Start: 2018-12-12 | End: 2019-07-29 | Stop reason: SDUPTHER

## 2018-12-12 NOTE — PROGRESS NOTES
Subjective:        Anne Gentile is a 58 y.o. female who here for follow up    Chief Complaint   Patient presents with   • Hypertension     1 YR   • Atrial Fibrillation       HPI   Anne Gentile is a 58-year-old female with a history of atrial fibrillation on amiodarone 200 mg  daily, benign essential arterial hypertension on Pradaxa for chronic anticoagulation here for a one year follow-up    Anne Gentile  here for follow up with shortness of breath with moderate distance, no complaints of chest pain, sob, palpitation, syncope, near syncope    No side effects with current meds  No pnd, orthopnea      The following portions of the patient's history were reviewed and updated as appropriate: allergies, current medications, past family history, past medical history, past social history, past surgical history and problem list.    Past Medical History:   Diagnosis Date   • Arthritis    • Atrial fibrillation (CMS/HCC)    • CHF (congestive heart failure) (CMS/HCC)    • Depression    • Disease of thyroid gland    • Hypertension          reports that she quit smoking about 7 years ago. She has a 30.00 pack-year smoking history. she has never used smokeless tobacco. She reports that she does not drink alcohol or use drugs.     Family History   Problem Relation Age of Onset   • Cancer Mother    • COPD Father    • Heart disease Father    • Hypertension Father        ROS     Review of Systems  Constitutional: No wt loss, fever, fatigue  Gastrointestinal: No nausea, abdominal pain  Behavioral/Psych: No insomnia or anxiety  Cardiovascular no chest pain/pressure  Pulmonary:  Denies shortness of breath, cough    Objective:           Physical Exam   Constitutional: She is oriented to person, place, and time. She appears well-developed and well-nourished.   HENT:   Head: Normocephalic.   Right Ear: External ear normal.   Left Ear: External ear normal.   Eyes: EOM are normal.   Neck: Normal range of motion. No JVD  present.   Cardiovascular: Normal rate, regular rhythm, normal heart sounds and intact distal pulses. Exam reveals no gallop and no friction rub.   No murmur heard.  Pulmonary/Chest: Effort normal and breath sounds normal. No stridor. No respiratory distress. She has no rales.   Abdominal: Soft. Bowel sounds are normal. She exhibits no distension. There is no tenderness. There is no guarding.   Musculoskeletal: Normal range of motion. She exhibits no edema or tenderness.   Neurological: She is alert and oriented to person, place, and time. She has normal reflexes.   Skin: Skin is warm.   Psychiatric: She has a normal mood and affect. Judgment normal.   Nursing note and vitals reviewed.        ECG 12 Lead  Date/Time: 12/12/2018 10:20 AM  Performed by: Denisha Palomino APRN  Authorized by: Denisha Paloimno APRN   Comparison: compared with previous ECG from 12/13/2017  Comparison to previous ECG: Previous ekg was SR  Rhythm: atrial fibrillation             Cardiographics  @  ECG 12 Lead  Date/Time: 12/13/2017 10:04 AM  Performed by: AZAM ANGEL  Authorized by: AZAM ANGEL   Comparison: compared with previous ECG   Similar to previous ECG  Rhythm: sinus rhythm  ST Flattening: all  Clinical impression: non-specific ECG        Current Outpatient Medications:   •  amiodarone (PACERONE) 200 MG tablet, Take 0.5 tablets by mouth Daily., Disp: 30 tablet, Rfl: 5  •  aspirin 81 MG chewable tablet, Chew 81 mg Daily., Disp: , Rfl:   •  carvedilol (COREG) 12.5 MG tablet, Take 12.5 mg by mouth 2 (Two) Times a Day With Meals., Disp: , Rfl:   •  dabigatran etexilate (PRADAXA) 150 MG capsu, Take 1 capsule by mouth Every 12 (Twelve) Hours., Disp: 24 capsule, Rfl: 0  •  Diclofenac Sodium (PENNSAID) 2 % solution, Apply two pumps to the affected area twice a day prn pain, Disp: 112 g, Rfl: 12  •  escitalopram (LEXAPRO) 10 MG tablet, TAKE ONE TABLET BY MOUTH DAILY, Disp: , Rfl:   •  furosemide (LASIX) 20 MG  tablet, TAKE ONE TABLET BY MOUTH DAILY, Disp: , Rfl:   •  irbesartan (AVAPRO) 300 MG tablet, TAKE ONE TABLET BY MOUTH DAILY, Disp: , Rfl:   •  levothyroxine (SYNTHROID, LEVOTHROID) 150 MCG tablet, Take 150 mcg by mouth Daily., Disp: , Rfl:   •  omeprazole (PriLOSEC) 20 MG capsule, Take 40 mg by mouth., Disp: , Rfl:   •  topiramate (TOPAMAX) 50 MG tablet, TAKE ONE TABLET BY MOUTH DAILY, Disp: , Rfl:   •  nitroglycerin (NITROSTAT) 0.4 MG SL tablet, 1 under the tongue as needed for angina, may repeat q5mins for up three doses, Disp: 100 tablet, Rfl: 11     Assessment:        Patient Active Problem List   Diagnosis   • Malignant hypertension   • Acute rhinosinusitis   • Knee pain   • Chest pain at rest   • Chest pain   • Chronic headache   • Congestion of respiratory tract   • Depression   • Gastroesophageal reflux disease without esophagitis   • Hypertension   • Hypothyroidism   • Headache   • Morbid obesity (CMS/HCC)   • Other specified disorders of nose and paranasal sinuses   • Difficulty sleeping   • Unstable angina pectoris (CMS/HCC)   • Atrial fibrillation (CMS/HCC)   • A-fib (CMS/HCC)   • H/O amiodarone therapy   • Anticoagulated               Plan:   1. Essential hypertension   2. Paroxysmal atrial fibrillation   3. H/O amiodarone therapy   4. Anticoagulated         1. Essential hypertension: /82, VR 67. Will increase coreg to 25 mg BID.       2. Paroxysmal atrial fibrillation: Currently in afib.  Will increase amio 200 mg daily to 200 mg BID until seen by Dr. Kumari in 2 weeks.     3. H/O amiodarone therapy  Will increase amio 200 mg daily to 200 mg BID until seen by Dr. Kumari in 2 weeks      Anne Gentile IS ON AMIODARONE,   Significant side effects associated with this medication has been explained      Pros and cons of the medications has been discussed, decision has been to continue the medication   6 months to yearly checkup for eye examination, thyroid function test, chest x-ray  and liver function test has been advised     Patient understands well       4. Anticoagulated: On pradaxa  In the setting of atrial fibrillation, I have discussed with the patient the risks and benefits of anticoagulation therapy which include increased risk of bleeding and decreased risk of systemic embolization such as stroke. Patient verbalize understanding and agree with treatment plan.        Need for the regular blood workup and adjust the dose has been explained     Need for proper follow-up on anticoagulation also has been explained             No diagnosis found.    There are no diagnoses linked to this encounter.    COUNSELING:    Anne Yang was given to patient for the following topics: diagnostic results, risk factor reductions, impressions, risks and benefits of treatment options and importance of treatment compliance .       SMOKING COUNSELING:    Counseling given: Not Answered    Currently in afib with BP increased lately per patient.  Will increase coreg to 25 mg BID and will increase amio to 200 BID daily per Dr. Kumari.  Will see Dr. Kumari 2 weeks.    Sincerely,   SAM Baez  [facility]  12/12/18  9:43 AM

## 2018-12-27 ENCOUNTER — OFFICE VISIT (OUTPATIENT)
Dept: CARDIOLOGY | Facility: CLINIC | Age: 58
End: 2018-12-27

## 2018-12-27 VITALS
DIASTOLIC BLOOD PRESSURE: 73 MMHG | HEIGHT: 67 IN | WEIGHT: 293 LBS | BODY MASS INDEX: 45.99 KG/M2 | HEART RATE: 60 BPM | SYSTOLIC BLOOD PRESSURE: 135 MMHG

## 2018-12-27 DIAGNOSIS — Z92.29 H/O AMIODARONE THERAPY: ICD-10-CM

## 2018-12-27 DIAGNOSIS — R94.31 ABNORMAL EKG: ICD-10-CM

## 2018-12-27 DIAGNOSIS — I48.0 PAROXYSMAL ATRIAL FIBRILLATION (HCC): Primary | ICD-10-CM

## 2018-12-27 DIAGNOSIS — I10 ESSENTIAL HYPERTENSION: ICD-10-CM

## 2018-12-27 DIAGNOSIS — Z79.01 ANTICOAGULATED: ICD-10-CM

## 2018-12-27 PROCEDURE — 93000 ELECTROCARDIOGRAM COMPLETE: CPT | Performed by: INTERNAL MEDICINE

## 2018-12-27 PROCEDURE — 99213 OFFICE O/P EST LOW 20 MIN: CPT | Performed by: INTERNAL MEDICINE

## 2018-12-27 NOTE — PROGRESS NOTES
" Subjective:        Anne Gentile is a 58 y.o. female who here for follow up    CC  The follow-up for the atrial fibrillation and hypertension  HPI  58-year-old female with known history of the paroxysmal atrial fibrillation, benign essential arterial hypertension, on amiodarone therapy and on chronic anticoagulation is here for the follow-up    Patient denies any chest pains or tightness in chest    Shortness of breath on exertion only     Problem List Items Addressed This Visit        Cardiovascular and Mediastinum    Hypertension    Atrial fibrillation (CMS/HCC) - Primary    Relevant Orders    ECG 12 Lead       Other    H/O amiodarone therapy    Anticoagulated      Other Visit Diagnoses     Abnormal EKG        Relevant Orders    Treadmill Stress Test        .    The following portions of the patient's history were reviewed and updated as appropriate: allergies, current medications, past family history, past medical history, past social history, past surgical history and problem list.    Past Medical History:   Diagnosis Date   • Arthritis    • Atrial fibrillation (CMS/HCC)    • CHF (congestive heart failure) (CMS/HCC)    • Depression    • Disease of thyroid gland    • Hypertension      reports that she quit smoking about 7 years ago. She has a 30.00 pack-year smoking history. she has never used smokeless tobacco. She reports that she does not drink alcohol or use drugs.   Family History   Problem Relation Age of Onset   • Cancer Mother    • COPD Father    • Heart disease Father    • Hypertension Father        Review of Systems  Constitutional: No wt loss, fever, fatigue  Gastrointestinal: No nausea, abdominal pain  Behavioral/Psych: No insomnia or anxiety   Cardiovascular no chest pain  Objective:                 Physical Exam  /73   Pulse 60   Ht 170.2 cm (67\")   Wt (!) 170 kg (375 lb)   BMI 58.73 kg/m²     General appearance: NAD, conversant   Eyes: anicteric sclerae, moist conjunctivae; no " lid-lag; PERRLA   HENT: Atraumatic; oropharynx clear with moist mucous membranes and no mucosal ulcerations;  normal hard and soft palate   Neck: Trachea midline; FROM, supple, no thyromegaly or lymphadenopathy   Lungs: CTA, with normal respiratory effort and no intercostal retractions   CV: S1-S2 regular, no murmurs, no rub, no gallop   Abdomen: Soft, non-tender; no masses or HSM   Extremities: No peripheral edema or extremity lymphadenopathy  Skin: Normal temperature, turgor and texture; no rash, ulcers or subcutaneous nodules   Psych: Appropriate affect, alert and oriented to person, place and time             ECG 12 Lead  Date/Time: 12/27/2018 10:42 AM  Performed by: Filemon Kumari MD  Authorized by: Filemon Kumari MD   Comparison: compared with previous ECG   Similar to previous ECG  Rhythm: sinus rhythm  ST Flattening: all  Clinical impression: non-specific ECG              Echocardiogram:        Current Outpatient Medications:   •  amiodarone (PACERONE) 200 MG tablet, Take 0.5 tablets by mouth Daily., Disp: 30 tablet, Rfl: 5  •  aspirin 81 MG chewable tablet, Chew 81 mg Daily., Disp: , Rfl:   •  carvedilol (COREG) 25 MG tablet, TAKE 1 TAB BID, Disp: 60 tablet, Rfl: 5  •  dabigatran etexilate (PRADAXA) 150 MG capsu, Take 1 capsule by mouth Every 12 (Twelve) Hours., Disp: 24 capsule, Rfl: 0  •  Diclofenac Sodium (PENNSAID) 2 % solution, Apply two pumps to the affected area twice a day prn pain, Disp: 112 g, Rfl: 12  •  escitalopram (LEXAPRO) 10 MG tablet, TAKE ONE TABLET BY MOUTH DAILY, Disp: , Rfl:   •  furosemide (LASIX) 20 MG tablet, TAKE ONE TABLET BY MOUTH DAILY, Disp: , Rfl:   •  irbesartan (AVAPRO) 300 MG tablet, TAKE ONE TABLET BY MOUTH DAILY, Disp: , Rfl:   •  levothyroxine (SYNTHROID, LEVOTHROID) 150 MCG tablet, Take 150 mcg by mouth Daily., Disp: , Rfl:   •  nitroglycerin (NITROSTAT) 0.4 MG SL tablet, 1 under the tongue as needed for angina, may repeat q5mins for up three doses, Disp:  100 tablet, Rfl: 11  •  omeprazole (PriLOSEC) 20 MG capsule, Take 40 mg by mouth., Disp: , Rfl:   •  topiramate (TOPAMAX) 50 MG tablet, TAKE ONE TABLET BY MOUTH DAILY, Disp: , Rfl:    Assessment:        Patient Active Problem List   Diagnosis   • Malignant hypertension   • Acute rhinosinusitis   • Knee pain   • Chest pain at rest   • Chest pain   • Chronic headache   • Congestion of respiratory tract   • Depression   • Gastroesophageal reflux disease without esophagitis   • Hypertension   • Hypothyroidism   • Headache   • Morbid obesity (CMS/HCC)   • Other specified disorders of nose and paranasal sinuses   • Difficulty sleeping   • Unstable angina pectoris (CMS/HCC)   • Atrial fibrillation (CMS/HCC)   • A-fib (CMS/HCC)   • H/O amiodarone therapy   • Anticoagulated               Plan:            ICD-10-CM ICD-9-CM   1. Paroxysmal atrial fibrillation (CMS/HCC) I48.0 427.31   2. Abnormal EKG R94.31 794.31   3. Essential hypertension I10 401.9   4. H/O amiodarone therapy Z92.29 V87.49   5. Anticoagulated Z79.01 V58.61     1. Paroxysmal atrial fibrillation (CMS/HCC)  Remaining normal sinus rhythm  - ECG 12 Lead    2. Abnormal EKG  Considering the patient's symptoms as well as clinical situation and  EKG findings, along with cardiac risk factors, ischemic workup is necessary to rule out ischemic cardiomyopathy, stress induced arrhythmias, and functional capacity for diagnosis as well as prognostic consideration    - Treadmill Stress Test    3. Essential hypertension  Blood pressure controlled    4. H/O amiodarone therapy  Anne Gentile IS ON AMIODARONE,   Significant side effects associated with this medication has been explained     Pros and cons of the medications has been discussed, decision has been to continue the medication   6 months to yearly checkup for eye examination, thyroid function test, chest x-ray and liver function test has been advised    Patient understands well      5. Anticoagulated  Pros and  cons as well as indication of the anticoagulation has been explained to the patient in detail    There are no obvious complications at this stage    Risk of  the bleedings has been explained    Need for the regular blood workup and adjust the dose has been explained    Need for proper follow-up on anticoagulation also has been explained         Pt getting amio checks by PCP    ett    See in 1 yr  COUNSELING:    Anne Yang was given to patient for the following topics: diagnostic results, risk factor reductions, impressions, risks and benefits of treatment options and importance of treatment compliance .       SMOKING COUNSELING:    Counseling given: Not Answered      Dictated using Dragon dictation

## 2019-01-31 ENCOUNTER — APPOINTMENT (OUTPATIENT)
Dept: CARDIOLOGY | Facility: HOSPITAL | Age: 59
End: 2019-01-31
Attending: INTERNAL MEDICINE

## 2019-07-29 RX ORDER — CARVEDILOL 25 MG/1
TABLET ORAL
Qty: 60 TABLET | Refills: 5 | Status: SHIPPED | OUTPATIENT
Start: 2019-07-29 | End: 2020-08-31

## 2019-12-23 ENCOUNTER — OFFICE VISIT (OUTPATIENT)
Dept: CARDIOLOGY | Facility: CLINIC | Age: 59
End: 2019-12-23

## 2019-12-23 VITALS
DIASTOLIC BLOOD PRESSURE: 73 MMHG | BODY MASS INDEX: 45.99 KG/M2 | WEIGHT: 293 LBS | HEIGHT: 67 IN | SYSTOLIC BLOOD PRESSURE: 112 MMHG | HEART RATE: 93 BPM

## 2019-12-23 DIAGNOSIS — E66.01 MORBID OBESITY (HCC): ICD-10-CM

## 2019-12-23 DIAGNOSIS — I48.0 PAROXYSMAL ATRIAL FIBRILLATION (HCC): ICD-10-CM

## 2019-12-23 DIAGNOSIS — R94.31 ABNORMAL EKG: Primary | ICD-10-CM

## 2019-12-23 DIAGNOSIS — Z79.01 ANTICOAGULATED: ICD-10-CM

## 2019-12-23 PROCEDURE — 93000 ELECTROCARDIOGRAM COMPLETE: CPT | Performed by: INTERNAL MEDICINE

## 2019-12-23 PROCEDURE — 99213 OFFICE O/P EST LOW 20 MIN: CPT | Performed by: INTERNAL MEDICINE

## 2019-12-23 RX ORDER — LIRAGLUTIDE 6 MG/ML
INJECTION, SOLUTION SUBCUTANEOUS
COMMUNITY
Start: 2019-12-12 | End: 2020-01-27

## 2019-12-23 RX ORDER — CYCLOBENZAPRINE HCL 10 MG
TABLET ORAL
COMMUNITY
Start: 2019-10-16 | End: 2021-07-16 | Stop reason: HOSPADM

## 2019-12-23 NOTE — PROGRESS NOTES
Subjective:        Anne Gentile is a 59 y.o. female who here for follow up    CC  Atrial fibrillation  HPI  59-year-old female with atrial fibrillation morbid obesity anticoagulation abnormal EKG here for the follow-up, patient denies any chest pains or tightness in chest no heaviness of the pressure sensation     Problem List Items Addressed This Visit        Cardiovascular and Mediastinum    Atrial fibrillation (CMS/HCC)    Relevant Orders    Stress Test With Myocardial Perfusion One Day    Adult Transthoracic Echo Complete W/ Cont if Necessary Per Protocol       Digestive    Morbid obesity (CMS/HCC)    Relevant Orders    Stress Test With Myocardial Perfusion One Day    Adult Transthoracic Echo Complete W/ Cont if Necessary Per Protocol       Other    Anticoagulated    Relevant Orders    Stress Test With Myocardial Perfusion One Day    Adult Transthoracic Echo Complete W/ Cont if Necessary Per Protocol      Other Visit Diagnoses     Abnormal EKG    -  Primary    Relevant Orders    Stress Test With Myocardial Perfusion One Day    Adult Transthoracic Echo Complete W/ Cont if Necessary Per Protocol        .    The following portions of the patient's history were reviewed and updated as appropriate: allergies, current medications, past family history, past medical history, past social history, past surgical history and problem list.    Past Medical History:   Diagnosis Date   • Arthritis    • Atrial fibrillation (CMS/HCC)    • CHF (congestive heart failure) (CMS/HCC)    • Depression    • Disease of thyroid gland    • Hypertension      reports that she quit smoking about 8 years ago. She has a 30.00 pack-year smoking history. She has never used smokeless tobacco. She reports that she does not drink alcohol or use drugs.   Family History   Problem Relation Age of Onset   • Cancer Mother    • COPD Father    • Heart disease Father    • Hypertension Father        Review of Systems  Constitutional: No wt loss, fever,  "fatigue  Gastrointestinal: No nausea, abdominal pain  Behavioral/Psych: No insomnia or anxiety   Cardiovascular no chest pains or tightness no chest  Objective:       Physical Exam    /73   Pulse 93   Ht 170.2 cm (67\")   Wt (!) 151 kg (333 lb)   BMI 52.16 kg/m²   General appearance: No acute changes   Neck: Trachea midline; NECK, supple, no thyromegaly or lymphadenopathy   Lungs: Normal size and shape, normal breath sounds, equal distribution of air, no rales and rhonchi   CV: S1-S2 irregular, no murmurs, no rub, no gallop   Abdomen: Soft, non-tender; no masses , no abnormal abdominal sounds   Extremities: No deformity , normal color , no peripheral edema   Skin: Normal temperature, turgor and texture; no rash, ulcers            ECG 12 Lead  Date/Time: 12/23/2019 11:46 AM  Performed by: Filemon Kumari MD  Authorized by: Filemon Kumari MD   Comparison: compared with previous ECG   Comparison to previous ECG: Back in afib                Echocardiogram:        Current Outpatient Medications:   •  amiodarone (PACERONE) 200 MG tablet, Take 0.5 tablets by mouth Daily., Disp: 30 tablet, Rfl: 5  •  aspirin 81 MG chewable tablet, Chew 81 mg Daily., Disp: , Rfl:   •  carvedilol (COREG) 25 MG tablet, TAKE 1 TAB BID, Disp: 60 tablet, Rfl: 5  •  cyclobenzaprine (FLEXERIL) 10 MG tablet, , Disp: , Rfl:   •  dabigatran etexilate (PRADAXA) 150 MG capsu, Take 1 capsule by mouth Every 12 (Twelve) Hours., Disp: 24 capsule, Rfl: 0  •  Diclofenac Sodium (PENNSAID) 2 % solution, Apply two pumps to the affected area twice a day prn pain, Disp: 112 g, Rfl: 12  •  escitalopram (LEXAPRO) 10 MG tablet, TAKE ONE TABLET BY MOUTH DAILY, Disp: , Rfl:   •  furosemide (LASIX) 20 MG tablet, TAKE ONE TABLET BY MOUTH DAILY, Disp: , Rfl:   •  irbesartan (AVAPRO) 300 MG tablet, TAKE ONE TABLET BY MOUTH DAILY, Disp: , Rfl:   •  levothyroxine (SYNTHROID, LEVOTHROID) 150 MCG tablet, Take 150 mcg by mouth Daily., Disp: , Rfl:   •  " nitroglycerin (NITROSTAT) 0.4 MG SL tablet, 1 under the tongue as needed for angina, may repeat q5mins for up three doses, Disp: 100 tablet, Rfl: 11  •  omeprazole (PriLOSEC) 20 MG capsule, Take 40 mg by mouth., Disp: , Rfl:   •  SAXENDA 18 MG/3ML solution pen-injector, , Disp: , Rfl:   •  topiramate (TOPAMAX) 50 MG tablet, TAKE ONE TABLET BY MOUTH DAILY, Disp: , Rfl:    Assessment:        Patient Active Problem List   Diagnosis   • Malignant hypertension   • Acute rhinosinusitis   • Knee pain   • Chest pain at rest   • Chest pain   • Chronic headache   • Congestion of respiratory tract   • Depression   • Gastroesophageal reflux disease without esophagitis   • Hypertension   • Hypothyroidism   • Headache   • Morbid obesity (CMS/HCC)   • Other specified disorders of nose and paranasal sinuses   • Difficulty sleeping   • Unstable angina pectoris (CMS/HCC)   • Atrial fibrillation (CMS/HCC)   • A-fib (CMS/HCC)   • H/O amiodarone therapy   • Anticoagulated               Plan:            ICD-10-CM ICD-9-CM   1. Abnormal EKG R94.31 794.31   2. Paroxysmal atrial fibrillation (CMS/HCC) I48.0 427.31   3. Morbid obesity (CMS/HCC) E66.01 278.01   4. Anticoagulated Z79.01 V58.61     1. Paroxysmal atrial fibrillation (CMS/HCC)  Considering the patient's symptoms as well as clinical situation and  EKG findings, along with cardiac risk factors, ischemic workup is necessary to rule out ischemic cardiomyopathy, stress induced arrhythmias, and functional capacity for diagnosis as well as prognostic consideration    - Stress Test With Myocardial Perfusion One Day  - Adult Transthoracic Echo Complete W/ Cont if Necessary Per Protocol    2. Morbid obesity (CMS/HCC)  Significant risk of obesity to CAD, HTN has been explained  Advantages of wt reduction has been explained    - Stress Test With Myocardial Perfusion One Day  - Adult Transthoracic Echo Complete W/ Cont if Necessary Per Protocol    3. Anticoagulated  Pros and cons as well as  indication of the anticoagulation has been explained to the patient in detail    There are no obvious complications at this stage    Risk of  the bleedings has been explained    Need for the regular blood workup and adjust the dose has been explained    Need for proper follow-up on anticoagulation also has been explained    - Stress Test With Myocardial Perfusion One Day  - Adult Transthoracic Echo Complete W/ Cont if Necessary Per Protocol    4. Abnormal EKG  Considering patient's medical condition as well as the risk factors, patient will require echocardiogram for further evaluation for the LV function, four-chamber evaluation, including the pressures, valvular function and  pericardial disease and pericardial effusion    - Stress Test With Myocardial Perfusion One Day  - Adult Transthoracic Echo Complete W/ Cont if Necessary Per Protocol       Dc amiodarone as pt in afib    Lexiscn, echo  COUNSELING:    Anne Yang was given to patient for the following topics: diagnostic results, risk factor reductions, impressions, risks and benefits of treatment options and importance of treatment compliance .       SMOKING COUNSELING:    [unfilled]    Dictated using Dragon dictation

## 2020-01-27 ENCOUNTER — HOSPITAL ENCOUNTER (OUTPATIENT)
Dept: CARDIOLOGY | Facility: HOSPITAL | Age: 60
Discharge: HOME | End: 2020-01-27

## 2020-01-27 ENCOUNTER — HOSPITAL ENCOUNTER (OUTPATIENT)
Dept: CARDIOLOGY | Facility: HOSPITAL | Age: 60
Discharge: HOME | End: 2020-01-27
Admitting: INTERNAL MEDICINE

## 2020-01-27 VITALS
SYSTOLIC BLOOD PRESSURE: 88 MMHG | BODY MASS INDEX: 45.99 KG/M2 | HEIGHT: 67 IN | WEIGHT: 293 LBS | HEART RATE: 98 BPM | DIASTOLIC BLOOD PRESSURE: 59 MMHG

## 2020-01-27 VITALS
SYSTOLIC BLOOD PRESSURE: 131 MMHG | RESPIRATION RATE: 18 BRPM | HEART RATE: 81 BPM | BODY MASS INDEX: 45.99 KG/M2 | OXYGEN SATURATION: 97 % | HEIGHT: 67 IN | DIASTOLIC BLOOD PRESSURE: 89 MMHG | WEIGHT: 293 LBS

## 2020-01-27 LAB
BH CV STRESS BP STAGE 1: NORMAL
BH CV STRESS COMMENTS STAGE 1: NORMAL
BH CV STRESS DOSE REGADENOSON STAGE 1: 0.4
BH CV STRESS DURATION MIN STAGE 1: 2
BH CV STRESS DURATION SEC STAGE 1: 1
BH CV STRESS GRADE STAGE 1: 0
BH CV STRESS HR STAGE 1: 114
BH CV STRESS METS STAGE 1: 1.3
BH CV STRESS PROTOCOL 1: NORMAL
BH CV STRESS RECOVERY BP: NORMAL MMHG
BH CV STRESS RECOVERY HR: 83 BPM
BH CV STRESS RECOVERY O2: 97 %
BH CV STRESS SPEED STAGE 1: 0.6
BH CV STRESS STAGE 1: 1
LV EF NUC BP: 60 %
MAXIMAL PREDICTED HEART RATE: 160 BPM
PERCENT MAX PREDICTED HR: 71.25 %
STRESS BASELINE BP: NORMAL MMHG
STRESS BASELINE HR: 81 BPM
STRESS O2 SAT REST: 97 %
STRESS PERCENT HR: 84 %
STRESS POST ESTIMATED WORKLOAD: 1.3 METS
STRESS POST EXERCISE DUR MIN: 2 MIN
STRESS POST EXERCISE DUR SEC: 1 SEC
STRESS POST PEAK BP: NORMAL MMHG
STRESS POST PEAK HR: 114 BPM
STRESS TARGET HR: 136 BPM

## 2020-01-27 PROCEDURE — 93306 TTE W/DOPPLER COMPLETE: CPT

## 2020-01-27 PROCEDURE — 25010000002 PERFLUTREN (DEFINITY) 8.476 MG IN SODIUM CHLORIDE 0.9 % 10 ML INJECTION: Performed by: INTERNAL MEDICINE

## 2020-01-27 PROCEDURE — 93017 CV STRESS TEST TRACING ONLY: CPT

## 2020-01-27 PROCEDURE — 78452 HT MUSCLE IMAGE SPECT MULT: CPT

## 2020-01-27 PROCEDURE — 93016 CV STRESS TEST SUPVJ ONLY: CPT | Performed by: INTERNAL MEDICINE

## 2020-01-27 PROCEDURE — 93306 TTE W/DOPPLER COMPLETE: CPT | Performed by: INTERNAL MEDICINE

## 2020-01-27 PROCEDURE — A9500 TC99M SESTAMIBI: HCPCS | Performed by: INTERNAL MEDICINE

## 2020-01-27 PROCEDURE — 25010000002 REGADENOSON 0.4 MG/5ML SOLUTION: Performed by: INTERNAL MEDICINE

## 2020-01-27 PROCEDURE — 93018 CV STRESS TEST I&R ONLY: CPT | Performed by: INTERNAL MEDICINE

## 2020-01-27 PROCEDURE — 78452 HT MUSCLE IMAGE SPECT MULT: CPT | Performed by: INTERNAL MEDICINE

## 2020-01-27 PROCEDURE — 0 TECHNETIUM SESTAMIBI: Performed by: INTERNAL MEDICINE

## 2020-01-27 RX ORDER — SERTRALINE HYDROCHLORIDE 100 MG/1
TABLET, FILM COATED ORAL
COMMUNITY
Start: 2020-01-20 | End: 2021-10-22 | Stop reason: HOSPADM

## 2020-01-27 RX ADMIN — REGADENOSON 0.4 MG: 0.08 INJECTION, SOLUTION INTRAVENOUS at 09:20

## 2020-01-27 RX ADMIN — TECHNETIUM TC 99M SESTAMIBI 1 DOSE: 1 INJECTION INTRAVENOUS at 07:14

## 2020-01-27 RX ADMIN — PERFLUTREN 3 ML: 6.52 INJECTION, SUSPENSION INTRAVENOUS at 07:30

## 2020-01-27 RX ADMIN — TECHNETIUM TC 99M SESTAMIBI 1 DOSE: 1 INJECTION INTRAVENOUS at 09:20

## 2020-01-28 LAB
AORTIC DIMENSIONLESS INDEX: 0.6 (DI)
BH CV ECHO MEAS - AO MAX PG (FULL): 5.7 MMHG
BH CV ECHO MEAS - AO MAX PG: 10 MMHG
BH CV ECHO MEAS - AO MEAN PG (FULL): 3 MMHG
BH CV ECHO MEAS - AO MEAN PG: 5 MMHG
BH CV ECHO MEAS - AO ROOT AREA (BSA CORRECTED): 1
BH CV ECHO MEAS - AO ROOT AREA: 5.3 CM^2
BH CV ECHO MEAS - AO ROOT DIAM: 2.6 CM
BH CV ECHO MEAS - AO V2 MAX: 158 CM/SEC
BH CV ECHO MEAS - AO V2 MEAN: 96.9 CM/SEC
BH CV ECHO MEAS - AO V2 VTI: 29.7 CM
BH CV ECHO MEAS - AVA(I,A): 2.5 CM^2
BH CV ECHO MEAS - AVA(I,D): 2.5 CM^2
BH CV ECHO MEAS - AVA(V,A): 2.7 CM^2
BH CV ECHO MEAS - AVA(V,D): 2.7 CM^2
BH CV ECHO MEAS - BSA(HAYCOCK): 2.8 M^2
BH CV ECHO MEAS - BSA: 2.5 M^2
BH CV ECHO MEAS - BZI_BMI: 52.2 KILOGRAMS/M^2
BH CV ECHO MEAS - BZI_METRIC_HEIGHT: 170.2 CM
BH CV ECHO MEAS - BZI_METRIC_WEIGHT: 151 KG
BH CV ECHO MEAS - EDV(CUBED): 166.4 ML
BH CV ECHO MEAS - EDV(MOD-SP2): 132 ML
BH CV ECHO MEAS - EDV(MOD-SP4): 154 ML
BH CV ECHO MEAS - EDV(TEICH): 147.4 ML
BH CV ECHO MEAS - EF(CUBED): 64.3 %
BH CV ECHO MEAS - EF(MOD-BP): 56 %
BH CV ECHO MEAS - EF(MOD-SP2): 54.5 %
BH CV ECHO MEAS - EF(MOD-SP4): 54.5 %
BH CV ECHO MEAS - EF(TEICH): 55.3 %
BH CV ECHO MEAS - ESV(CUBED): 59.3 ML
BH CV ECHO MEAS - ESV(MOD-SP2): 60 ML
BH CV ECHO MEAS - ESV(MOD-SP4): 70 ML
BH CV ECHO MEAS - ESV(TEICH): 65.9 ML
BH CV ECHO MEAS - FS: 29.1 %
BH CV ECHO MEAS - IVS/LVPW: 0.83
BH CV ECHO MEAS - IVSD: 1 CM
BH CV ECHO MEAS - LAT PEAK E' VEL: 14.6 CM/SEC
BH CV ECHO MEAS - LV DIASTOLIC VOL/BSA (35-75): 61.3 ML/M^2
BH CV ECHO MEAS - LV MASS(C)D: 242 GRAMS
BH CV ECHO MEAS - LV MASS(C)DI: 96.4 GRAMS/M^2
BH CV ECHO MEAS - LV MAX PG: 4.2 MMHG
BH CV ECHO MEAS - LV MEAN PG: 2 MMHG
BH CV ECHO MEAS - LV SYSTOLIC VOL/BSA (12-30): 27.9 ML/M^2
BH CV ECHO MEAS - LV V1 MAX: 103 CM/SEC
BH CV ECHO MEAS - LV V1 MEAN: 65.8 CM/SEC
BH CV ECHO MEAS - LV V1 VTI: 18.2 CM
BH CV ECHO MEAS - LVIDD: 5.5 CM
BH CV ECHO MEAS - LVIDS: 3.9 CM
BH CV ECHO MEAS - LVLD AP2: 7.8 CM
BH CV ECHO MEAS - LVLD AP4: 8.3 CM
BH CV ECHO MEAS - LVLS AP2: 7.1 CM
BH CV ECHO MEAS - LVLS AP4: 7.2 CM
BH CV ECHO MEAS - LVOT AREA (M): 4.2 CM^2
BH CV ECHO MEAS - LVOT AREA: 4.2 CM^2
BH CV ECHO MEAS - LVOT DIAM: 2.3 CM
BH CV ECHO MEAS - LVPWD: 1.2 CM
BH CV ECHO MEAS - MED PEAK E' VEL: 11 CM/SEC
BH CV ECHO MEAS - MR MAX PG: 44.9 MMHG
BH CV ECHO MEAS - MR MAX VEL: 335 CM/SEC
BH CV ECHO MEAS - MV DEC SLOPE: 316 CM/SEC^2
BH CV ECHO MEAS - MV DEC TIME: 153 SEC
BH CV ECHO MEAS - MV E MAX VEL: 110 CM/SEC
BH CV ECHO MEAS - MV MAX PG: 4.8 MMHG
BH CV ECHO MEAS - MV MEAN PG: 2 MMHG
BH CV ECHO MEAS - MV P1/2T MAX VEL: 119 CM/SEC
BH CV ECHO MEAS - MV P1/2T: 110.3 MSEC
BH CV ECHO MEAS - MV V2 MAX: 110 CM/SEC
BH CV ECHO MEAS - MV V2 MEAN: 58.2 CM/SEC
BH CV ECHO MEAS - MV V2 VTI: 28.8 CM
BH CV ECHO MEAS - MVA P1/2T LCG: 1.8 CM^2
BH CV ECHO MEAS - MVA(P1/2T): 2 CM^2
BH CV ECHO MEAS - MVA(VTI): 2.6 CM^2
BH CV ECHO MEAS - PA ACC TIME: 0.05 SEC
BH CV ECHO MEAS - PA MAX PG (FULL): 3.8 MMHG
BH CV ECHO MEAS - PA MAX PG: 6.3 MMHG
BH CV ECHO MEAS - PA PR(ACCEL): 55.2 MMHG
BH CV ECHO MEAS - PA V2 MAX: 125 CM/SEC
BH CV ECHO MEAS - PI END-D VEL: 89.6 CM/SEC
BH CV ECHO MEAS - PULM DIAS VEL: 55.2 CM/SEC
BH CV ECHO MEAS - PULM S/D: 0.78
BH CV ECHO MEAS - PULM SYS VEL: 43 CM/SEC
BH CV ECHO MEAS - RAP SYSTOLE: 3 MMHG
BH CV ECHO MEAS - RV MAX PG: 2.5 MMHG
BH CV ECHO MEAS - RV MEAN PG: 1 MMHG
BH CV ECHO MEAS - RV V1 MAX: 78.3 CM/SEC
BH CV ECHO MEAS - RV V1 MEAN: 55.6 CM/SEC
BH CV ECHO MEAS - RV V1 VTI: 14.6 CM
BH CV ECHO MEAS - RVSP: 30 MMHG
BH CV ECHO MEAS - SI(AO): 62.8 ML/M^2
BH CV ECHO MEAS - SI(CUBED): 42.6 ML/M^2
BH CV ECHO MEAS - SI(LVOT): 30.1 ML/M^2
BH CV ECHO MEAS - SI(MOD-SP2): 28.7 ML/M^2
BH CV ECHO MEAS - SI(MOD-SP4): 33.4 ML/M^2
BH CV ECHO MEAS - SI(TEICH): 32.5 ML/M^2
BH CV ECHO MEAS - SV(AO): 157.7 ML
BH CV ECHO MEAS - SV(CUBED): 107.1 ML
BH CV ECHO MEAS - SV(LVOT): 75.6 ML
BH CV ECHO MEAS - SV(MOD-SP2): 72 ML
BH CV ECHO MEAS - SV(MOD-SP4): 84 ML
BH CV ECHO MEAS - SV(TEICH): 81.5 ML
BH CV ECHO MEAS - TAPSE (>1.6): 2 CM
BH CV ECHO MEAS - TR MAX PG: 27 MMHG
BH CV ECHO MEAS - TR MAX VEL: 262 CM/SEC
BH CV ECHO MEASUREMENTS AVERAGE E/E' RATIO: 8.59
BH CV XLRA - RV BASE: 3.7 CM
BH CV XLRA - TDI S': 12.4 CM/SEC
LEFT ATRIUM VOLUME INDEX: 38.4 ML/M2
MAXIMAL PREDICTED HEART RATE: 160 BPM
STRESS TARGET HR: 136 BPM

## 2020-02-06 ENCOUNTER — OFFICE VISIT (OUTPATIENT)
Dept: CARDIOLOGY | Facility: CLINIC | Age: 60
End: 2020-02-06

## 2020-02-06 VITALS
BODY MASS INDEX: 52.16 KG/M2 | HEART RATE: 93 BPM | HEIGHT: 67 IN | DIASTOLIC BLOOD PRESSURE: 90 MMHG | SYSTOLIC BLOOD PRESSURE: 125 MMHG

## 2020-02-06 DIAGNOSIS — Z79.01 ANTICOAGULATED: ICD-10-CM

## 2020-02-06 DIAGNOSIS — I48.0 PAROXYSMAL ATRIAL FIBRILLATION (HCC): ICD-10-CM

## 2020-02-06 DIAGNOSIS — Z92.29 H/O AMIODARONE THERAPY: ICD-10-CM

## 2020-02-06 DIAGNOSIS — I10 ESSENTIAL HYPERTENSION: Primary | ICD-10-CM

## 2020-02-06 PROCEDURE — 99213 OFFICE O/P EST LOW 20 MIN: CPT | Performed by: INTERNAL MEDICINE

## 2020-02-06 NOTE — PROGRESS NOTES
Subjective:        Anne Gentile is a 59 y.o. female who here for follow up    CC  AFIB    NO EPISODES OF AFIB SYMPTOMS  HPI  59 years old female with known history of the benign essential arterial hypertension, atrial fibrillation on amiodarone therapy on chronic anticoagulation here for the follow-up patient has no episodes of atrial fibrillation symptoms at this point     Problem List Items Addressed This Visit        Cardiovascular and Mediastinum    Hypertension - Primary    A-fib (CMS/HCC)       Other    H/O amiodarone therapy    Anticoagulated        .Interpretation Summary        · Findings consistent with an equivocal ECG stress test.  · Left ventricular ejection fraction is normal (Calculated EF = 60%).  · Myocardial perfusion imaging indicates a normal myocardial perfusion study with no evidence of ischemia.  · Impressions are consistent with a low risk study.  · Patchy uptake with small lateral wall ischemia can not be ruled out     Interpretation Summary     · Right ventricular cavity is borderline dilated.  · Left atrial cavity size is mildly dilated.  · Calculated EF = 56.0%.  · There is no evidence of pericardial effusion            The following portions of the patient's history were reviewed and updated as appropriate: allergies, current medications, past family history, past medical history, past social history, past surgical history and problem list.    Past Medical History:   Diagnosis Date   • Arthritis    • Atrial fibrillation (CMS/HCC)    • CHF (congestive heart failure) (CMS/HCC)    • Depression    • Disease of thyroid gland    • Hypertension      reports that she quit smoking about 8 years ago. She has a 30.00 pack-year smoking history. She has never used smokeless tobacco. She reports that she does not drink alcohol or use drugs.   Family History   Problem Relation Age of Onset   • Cancer Mother    • COPD Father    • Heart disease Father    • Hypertension Father        Review of  "Systems  Constitutional: No wt loss, fever, fatigue  Gastrointestinal: No nausea, abdominal pain  Behavioral/Psych: No insomnia or anxiety   Cardiovascular no chest pains or tightness in the chest  Objective:       Physical Exam  /90 (BP Location: Left arm, Patient Position: Sitting)   Pulse 93   Ht 170.2 cm (67\")   BMI 52.16 kg/m²   General appearance: No acute changes   Neck: Trachea midline; NECK, supple, no thyromegaly or lymphadenopathy   Lungs: Normal size and shape, normal breath sounds, equal distribution of air, no rales and rhonchi   CV: S1-S2 regular, no murmurs, no rub, no gallop   Abdomen: Soft, non-tender; no masses , no abnormal abdominal sounds   Extremities: No deformity , normal color , no peripheral edema   Skin: Normal temperature, turgor and texture; no rash, ulcers          Procedures      Echocardiogram:        Current Outpatient Medications:   •  aspirin 81 MG chewable tablet, Chew 81 mg Daily., Disp: , Rfl:   •  carvedilol (COREG) 25 MG tablet, TAKE 1 TAB BID, Disp: 60 tablet, Rfl: 5  •  cyclobenzaprine (FLEXERIL) 10 MG tablet, , Disp: , Rfl:   •  dabigatran etexilate (PRADAXA) 150 MG capsu, Take 1 capsule by mouth Every 12 (Twelve) Hours., Disp: 24 capsule, Rfl: 0  •  Diclofenac Sodium (PENNSAID) 2 % solution, Apply two pumps to the affected area twice a day prn pain, Disp: 112 g, Rfl: 12  •  escitalopram (LEXAPRO) 10 MG tablet, TAKE ONE TABLET BY MOUTH DAILY, Disp: , Rfl:   •  furosemide (LASIX) 20 MG tablet, TAKE ONE TABLET BY MOUTH DAILY, Disp: , Rfl:   •  irbesartan (AVAPRO) 300 MG tablet, TAKE ONE TABLET BY MOUTH DAILY, Disp: , Rfl:   •  levothyroxine (SYNTHROID, LEVOTHROID) 150 MCG tablet, Take 150 mcg by mouth Daily., Disp: , Rfl:   •  nitroglycerin (NITROSTAT) 0.4 MG SL tablet, 1 under the tongue as needed for angina, may repeat q5mins for up three doses, Disp: 100 tablet, Rfl: 11  •  omeprazole (PriLOSEC) 20 MG capsule, Take 40 mg by mouth., Disp: , Rfl:   •  sertraline " (ZOLOFT) 100 MG tablet, , Disp: , Rfl:   •  topiramate (TOPAMAX) 50 MG tablet, TAKE ONE TABLET BY MOUTH DAILY, Disp: , Rfl:    Assessment:        Patient Active Problem List   Diagnosis   • Malignant hypertension   • Acute rhinosinusitis   • Knee pain   • Chest pain at rest   • Chest pain   • Chronic headache   • Congestion of respiratory tract   • Depression   • Gastroesophageal reflux disease without esophagitis   • Hypertension   • Hypothyroidism   • Headache   • Morbid obesity (CMS/HCC)   • Other specified disorders of nose and paranasal sinuses   • Difficulty sleeping   • Unstable angina pectoris (CMS/HCC)   • Atrial fibrillation (CMS/HCC)   • A-fib (CMS/HCC)   • H/O amiodarone therapy   • Anticoagulated               Plan:            ICD-10-CM ICD-9-CM   1. Essential hypertension I10 401.9   2. Paroxysmal atrial fibrillation (CMS/HCC) I48.0 427.31   3. H/O amiodarone therapy Z92.29 V87.49   4. Anticoagulated Z79.01 V58.61     1. Essential hypertension  Blood pressure controlled    2. Paroxysmal atrial fibrillation (CMS/HCC)  Controlled    3. H/O amiodarone therapy  Anne Gentile IS ON AMIODARONE,   Significant side effects associated with this medication has been explained     Pros and cons of the medications has been discussed, decision has been to continue the medication   6 months to yearly checkup for eye examination, thyroid function test, chest x-ray and liver function test has been advised    Patient understands well      4. Anticoagulated  Pros and cons as well as indication of the anticoagulation has been explained to the patient in detail    There are no obvious complications at this stage    Risk of  the bleedings has been explained    Need for the regular blood workup and adjust the dose has been explained    Need for proper follow-up on anticoagulation also has been explained         CV STABLE  SEE IN 1 YR    Specificity and sensitivity of the stress test/ cardiac workup has been explained. Pt  has been explained if  Symptoms continue please go to ER, and further w/p will be required.    Also explained this does not rule out coronary artery disease or the future events, continue to emphasize on risk reductions for coronary artery disease    Pt also advised to contact PCP for other causes of symptoms    COUNSELING:    Anne Monzoneling was given to patient for the following topics: diagnostic results, risk factor reductions, impressions, risks and benefits of treatment options and importance of treatment compliance .       SMOKING COUNSELING:    [unfilled]    Dictated using Dragon dictation

## 2020-03-26 RX ORDER — DABIGATRAN ETEXILATE 150 MG/1
150 CAPSULE ORAL EVERY 12 HOURS SCHEDULED
Qty: 60 CAPSULE | Refills: 2 | Status: SHIPPED | OUTPATIENT
Start: 2020-03-26 | End: 2020-09-21

## 2020-08-31 RX ORDER — CARVEDILOL 25 MG/1
TABLET ORAL
Qty: 60 TABLET | Refills: 4 | Status: SHIPPED | OUTPATIENT
Start: 2020-08-31 | End: 2021-07-05 | Stop reason: SDUPTHER

## 2020-09-21 RX ORDER — ATENOLOL 100 MG/1
TABLET ORAL
Qty: 60 CAPSULE | Refills: 1 | Status: SHIPPED | OUTPATIENT
Start: 2020-09-21 | End: 2020-10-16 | Stop reason: SDUPTHER

## 2020-10-16 RX ORDER — DABIGATRAN ETEXILATE 150 MG/1
150 CAPSULE ORAL EVERY 12 HOURS
Qty: 60 CAPSULE | Refills: 3 | Status: SHIPPED | OUTPATIENT
Start: 2020-10-16 | End: 2021-02-17

## 2020-10-16 RX ORDER — ATENOLOL 100 MG/1
TABLET ORAL
Qty: 60 CAPSULE | Refills: 1 | OUTPATIENT
Start: 2020-10-16

## 2020-11-16 DIAGNOSIS — I48.0 PAROXYSMAL ATRIAL FIBRILLATION (HCC): ICD-10-CM

## 2020-11-16 DIAGNOSIS — I10 ESSENTIAL HYPERTENSION: Primary | ICD-10-CM

## 2021-02-17 RX ORDER — ATENOLOL 100 MG/1
TABLET ORAL
Qty: 60 CAPSULE | Refills: 0 | Status: SHIPPED | OUTPATIENT
Start: 2021-02-17 | End: 2021-03-17

## 2021-02-25 ENCOUNTER — EPISODE CHANGES (OUTPATIENT)
Dept: CASE MANAGEMENT | Facility: OTHER | Age: 61
End: 2021-02-25

## 2021-03-16 ENCOUNTER — BULK ORDERING (OUTPATIENT)
Dept: CASE MANAGEMENT | Facility: OTHER | Age: 61
End: 2021-03-16

## 2021-03-16 DIAGNOSIS — Z23 IMMUNIZATION DUE: ICD-10-CM

## 2021-03-19 RX ORDER — ATENOLOL 100 MG/1
TABLET ORAL
Qty: 60 CAPSULE | Refills: 0 | Status: SHIPPED | OUTPATIENT
Start: 2021-03-19 | End: 2021-04-21

## 2021-04-21 RX ORDER — ATENOLOL 100 MG/1
TABLET ORAL
Qty: 60 CAPSULE | Refills: 0 | Status: SHIPPED | OUTPATIENT
Start: 2021-04-21 | End: 2021-09-24 | Stop reason: HOSPADM

## 2021-04-24 ENCOUNTER — IMMUNIZATION (OUTPATIENT)
Dept: VACCINE CLINIC | Facility: HOSPITAL | Age: 61
End: 2021-04-24

## 2021-04-24 PROCEDURE — 0001A: CPT | Performed by: INTERNAL MEDICINE

## 2021-04-24 PROCEDURE — 91300 HC SARSCOV02 VAC 30MCG/0.3ML IM: CPT | Performed by: INTERNAL MEDICINE

## 2021-05-15 ENCOUNTER — IMMUNIZATION (OUTPATIENT)
Dept: VACCINE CLINIC | Facility: HOSPITAL | Age: 61
End: 2021-05-15

## 2021-05-15 PROCEDURE — 91300 HC SARSCOV02 VAC 30MCG/0.3ML IM: CPT | Performed by: INTERNAL MEDICINE

## 2021-05-15 PROCEDURE — 0002A: CPT | Performed by: INTERNAL MEDICINE

## 2021-07-07 RX ORDER — CARVEDILOL 25 MG/1
TABLET ORAL
Qty: 60 TABLET | Refills: 0 | Status: SHIPPED | OUTPATIENT
Start: 2021-07-07 | End: 2021-08-31 | Stop reason: SDUPTHER

## 2021-07-13 ENCOUNTER — APPOINTMENT (OUTPATIENT)
Dept: GENERAL RADIOLOGY | Facility: HOSPITAL | Age: 61
End: 2021-07-13

## 2021-07-13 ENCOUNTER — APPOINTMENT (OUTPATIENT)
Dept: CT IMAGING | Facility: HOSPITAL | Age: 61
End: 2021-07-13

## 2021-07-13 ENCOUNTER — HOSPITAL ENCOUNTER (OUTPATIENT)
Facility: HOSPITAL | Age: 61
Setting detail: OBSERVATION
Discharge: HOME | End: 2021-07-16
Attending: EMERGENCY MEDICINE | Admitting: HOSPITALIST

## 2021-07-13 DIAGNOSIS — R06.09 DYSPNEA ON EXERTION: Primary | ICD-10-CM

## 2021-07-13 LAB
ALBUMIN SERPL-MCNC: 3.6 G/DL (ref 3.5–5.2)
ALBUMIN/GLOB SERPL: 1 G/DL
ALP SERPL-CCNC: 97 U/L (ref 39–117)
ALT SERPL W P-5'-P-CCNC: 13 U/L (ref 1–33)
ANION GAP SERPL CALCULATED.3IONS-SCNC: 6.2 MMOL/L (ref 5–15)
AST SERPL-CCNC: 28 U/L (ref 1–32)
BASOPHILS # BLD AUTO: 0.04 10*3/MM3 (ref 0–0.2)
BASOPHILS NFR BLD AUTO: 0.7 % (ref 0–1.5)
BILIRUB SERPL-MCNC: 0.3 MG/DL (ref 0–1.2)
BUN SERPL-MCNC: 17 MG/DL (ref 8–23)
BUN/CREAT SERPL: 19.5 (ref 7–25)
CALCIUM SPEC-SCNC: 8.8 MG/DL (ref 8.6–10.5)
CHLORIDE SERPL-SCNC: 101 MMOL/L (ref 98–107)
CO2 SERPL-SCNC: 33.8 MMOL/L (ref 22–29)
CREAT SERPL-MCNC: 0.87 MG/DL (ref 0.57–1)
D DIMER PPP FEU-MCNC: 0.4 MCGFEU/ML (ref 0–0.49)
DEPRECATED RDW RBC AUTO: 45.5 FL (ref 37–54)
EOSINOPHIL # BLD AUTO: 0.14 10*3/MM3 (ref 0–0.4)
EOSINOPHIL NFR BLD AUTO: 2.3 % (ref 0.3–6.2)
ERYTHROCYTE [DISTWIDTH] IN BLOOD BY AUTOMATED COUNT: 13.3 % (ref 12.3–15.4)
GFR SERPL CREATININE-BSD FRML MDRD: 66 ML/MIN/1.73
GLOBULIN UR ELPH-MCNC: 3.6 GM/DL
GLUCOSE SERPL-MCNC: 136 MG/DL (ref 65–99)
HCT VFR BLD AUTO: 37.4 % (ref 34–46.6)
HGB BLD-MCNC: 12 G/DL (ref 12–15.9)
IMM GRANULOCYTES # BLD AUTO: 0.03 10*3/MM3 (ref 0–0.05)
IMM GRANULOCYTES NFR BLD AUTO: 0.5 % (ref 0–0.5)
LYMPHOCYTES # BLD AUTO: 1.41 10*3/MM3 (ref 0.7–3.1)
LYMPHOCYTES NFR BLD AUTO: 23.5 % (ref 19.6–45.3)
MAGNESIUM SERPL-MCNC: 2.2 MG/DL (ref 1.6–2.4)
MCH RBC QN AUTO: 30.1 PG (ref 26.6–33)
MCHC RBC AUTO-ENTMCNC: 32.1 G/DL (ref 31.5–35.7)
MCV RBC AUTO: 93.7 FL (ref 79–97)
MONOCYTES # BLD AUTO: 0.37 10*3/MM3 (ref 0.1–0.9)
MONOCYTES NFR BLD AUTO: 6.2 % (ref 5–12)
NEUTROPHILS NFR BLD AUTO: 4.01 10*3/MM3 (ref 1.7–7)
NEUTROPHILS NFR BLD AUTO: 66.8 % (ref 42.7–76)
NRBC BLD AUTO-RTO: 0.2 /100 WBC (ref 0–0.2)
NT-PROBNP SERPL-MCNC: 512.9 PG/ML (ref 0–900)
PLATELET # BLD AUTO: 186 10*3/MM3 (ref 140–450)
PMV BLD AUTO: 9.7 FL (ref 6–12)
POTASSIUM SERPL-SCNC: 4.1 MMOL/L (ref 3.5–5.2)
POTASSIUM SERPL-SCNC: 5 MMOL/L (ref 3.5–5.2)
PROT SERPL-MCNC: 7.2 G/DL (ref 6–8.5)
QT INTERVAL: 376 MS
RBC # BLD AUTO: 3.99 10*6/MM3 (ref 3.77–5.28)
SARS-COV-2 ORF1AB RESP QL NAA+PROBE: NOT DETECTED
SODIUM SERPL-SCNC: 141 MMOL/L (ref 136–145)
TROPONIN T SERPL-MCNC: <0.01 NG/ML (ref 0–0.03)
WBC # BLD AUTO: 6 10*3/MM3 (ref 3.4–10.8)

## 2021-07-13 PROCEDURE — 93005 ELECTROCARDIOGRAM TRACING: CPT | Performed by: HOSPITALIST

## 2021-07-13 PROCEDURE — 70450 CT HEAD/BRAIN W/O DYE: CPT

## 2021-07-13 PROCEDURE — G0378 HOSPITAL OBSERVATION PER HR: HCPCS

## 2021-07-13 PROCEDURE — 85025 COMPLETE CBC W/AUTO DIFF WBC: CPT | Performed by: PHYSICIAN ASSISTANT

## 2021-07-13 PROCEDURE — 85379 FIBRIN DEGRADATION QUANT: CPT | Performed by: PHYSICIAN ASSISTANT

## 2021-07-13 PROCEDURE — 94799 UNLISTED PULMONARY SVC/PX: CPT

## 2021-07-13 PROCEDURE — 93010 ELECTROCARDIOGRAM REPORT: CPT | Performed by: INTERNAL MEDICINE

## 2021-07-13 PROCEDURE — 80053 COMPREHEN METABOLIC PANEL: CPT | Performed by: PHYSICIAN ASSISTANT

## 2021-07-13 PROCEDURE — U0004 COV-19 TEST NON-CDC HGH THRU: HCPCS | Performed by: EMERGENCY MEDICINE

## 2021-07-13 PROCEDURE — 84484 ASSAY OF TROPONIN QUANT: CPT | Performed by: PHYSICIAN ASSISTANT

## 2021-07-13 PROCEDURE — 84132 ASSAY OF SERUM POTASSIUM: CPT | Performed by: HOSPITALIST

## 2021-07-13 PROCEDURE — 83735 ASSAY OF MAGNESIUM: CPT | Performed by: HOSPITALIST

## 2021-07-13 PROCEDURE — 94640 AIRWAY INHALATION TREATMENT: CPT

## 2021-07-13 PROCEDURE — C9803 HOPD COVID-19 SPEC COLLECT: HCPCS

## 2021-07-13 PROCEDURE — 83880 ASSAY OF NATRIURETIC PEPTIDE: CPT | Performed by: PHYSICIAN ASSISTANT

## 2021-07-13 PROCEDURE — 99284 EMERGENCY DEPT VISIT MOD MDM: CPT

## 2021-07-13 PROCEDURE — 93005 ELECTROCARDIOGRAM TRACING: CPT

## 2021-07-13 PROCEDURE — 71045 X-RAY EXAM CHEST 1 VIEW: CPT

## 2021-07-13 RX ORDER — IPRATROPIUM BROMIDE AND ALBUTEROL SULFATE 2.5; .5 MG/3ML; MG/3ML
3 SOLUTION RESPIRATORY (INHALATION)
Status: DISCONTINUED | OUTPATIENT
Start: 2021-07-13 | End: 2021-07-16 | Stop reason: HOSPADM

## 2021-07-13 RX ORDER — SODIUM CHLORIDE 0.9 % (FLUSH) 0.9 %
10 SYRINGE (ML) INJECTION AS NEEDED
Status: DISCONTINUED | OUTPATIENT
Start: 2021-07-13 | End: 2021-07-16 | Stop reason: HOSPADM

## 2021-07-13 RX ADMIN — IPRATROPIUM BROMIDE AND ALBUTEROL SULFATE 3 ML: 2.5; .5 SOLUTION RESPIRATORY (INHALATION) at 20:13

## 2021-07-13 NOTE — ED NOTES
Patient from home with c/o shortness of breath. Patient states she was on a ventilator in January and has had progressively worsened shortness of breath since. Patient has also had headaches and blurred vision that started Friday.      Manda Garza RN  07/13/21 [id number]

## 2021-07-13 NOTE — ED NOTES
Pt reports she had COVID/pneumonia in Jan, and was at [facility] on vent at that time. PT reports SOA since, and unsure of when the SOA got worse at this time. PT denies CP at this time, no cough/fever. Pt reports she wears oxygen at night, but denies use during the day. Pt reports intermitting headache since last week, with vision changes.     Patient was placed in face mask during first look triage.  Patient was wearing a face mask throughout encounter.  I wore personal protective equipment throughout the encounter.  Hand hygiene was performed before and after patient encounter.                Safia Higgins, RN  07/13/21 1605

## 2021-07-13 NOTE — ED PROVIDER NOTES
EMERGENCY DEPARTMENT ENCOUNTER    Room Number:  06/06  Date of encounter:  7/13/2021  PCP: Radha Stewart MD  Historian: Patient, family      I used full protective equipment while examining this patient.  This includes face mask, gloves and protective eyewear.  I washed my hands before entering the room and immediately upon leaving the room      HPI:  Chief Complaint: Shortness of breath, headache  A complete HPI/ROS/PMH/PSH/SH/FH are unobtainable due to: Nothing    Context: Anne Gentile is a 61 y.o. female who presents to the ED c/o several month history of chronic shortness of breath.  Patient had an admission to [facility] in January for Covid pneumonia.  Patient was intubated and placed in the ICU.  Since that time patient has been on chronic nasal oxygen.  She states since then she has had terrible exertional dyspnea.  She denies any orthopnea, chest pain, fever.  She does complain of pedal edema.  She has not seen a pulmonologist since being discharged from the hospital.  Patient states she feels fine at rest.    In addition patient complains of a several day history of frontal headache.  Patient describes a headache as throbbing, moderate, severe.  There are no precipitating or alleviating factors.  Patient complains of mild blurry vision however denies any nausea, vomiting, facial droop, numbness or tingling.    Review of Medical Records  I reviewed admission from January 2021.  Patient was intubated.  Patient did have a echocardiogram which showed an EF of 42%.    PAST MEDICAL HISTORY  Active Ambulatory Problems     Diagnosis Date Noted   • Malignant hypertension 08/31/2014   • Acute rhinosinusitis 02/18/2016   • Knee pain 02/18/2016   • Chest pain at rest 02/18/2016   • Chest pain 08/30/2014   • Chronic headache 02/18/2016   • Congestion of respiratory tract 02/18/2016   • Depression 08/31/2014   • Gastroesophageal reflux disease without esophagitis 09/26/2014   • Hypertension 09/26/2014    • Hypothyroidism 08/31/2014   • Headache 08/31/2014   • Morbid obesity (CMS/[facility]) 08/31/2014   • Other specified disorders of nose and paranasal sinuses 02/18/2016   • Difficulty sleeping 02/18/2016   • Unstable angina pectoris (CMS/[facility]) 08/31/2014   • Atrial fibrillation (CMS/[facility]) 02/18/2016   • A-fib (CMS/[facility]) 01/23/2017   • H/O amiodarone therapy 01/30/2017   • Anticoagulated 01/30/2017     Resolved Ambulatory Problems     Diagnosis Date Noted   • No Resolved Ambulatory Problems     Past Medical History:   Diagnosis Date   • Arthritis    • CHF (congestive heart failure) (CMS/[facility])    • COPD (chronic obstructive pulmonary disease) (CMS/[facility])    • Coronary artery disease    • COVID-19    • Disease of thyroid gland          PAST SURGICAL HISTORY  Past Surgical History:   Procedure Laterality Date   • BREAST SURGERY     • CHOLECYSTECTOMY  26+ years ago   • KNEE ARTHROSCOPY Right 2013         FAMILY HISTORY  Family History   Problem Relation Age of Onset   • Cancer Mother    • COPD Father    • Heart disease Father    • Hypertension Father          SOCIAL HISTORY  Social History     Socioeconomic History   • Marital status: Single     Spouse name: Not on file   • Number of children: Not on file   • Years of education: Not on file   • Highest education level: Not on file   Tobacco Use   • Smoking status: Former Smoker     Packs/day: 1.50     Years: 20.00     Pack years: 30.00     Quit date: 3/23/2011     Years since quitting: 10.3   • Smokeless tobacco: Never Used   Vaping Use   • Vaping Use: Never used   Substance and Sexual Activity   • Alcohol use: No   • Drug use: No   • Sexual activity: Defer     Birth control/protection: Post-menopausal         ALLERGIES  Patient has no known allergies.        REVIEW OF SYSTEMS  All systems reviewed and negative except for those discussed in HPI.       PHYSICAL EXAM    I have reviewed the triage vital signs and nursing notes.    ED Triage Vitals   Temp Heart Rate Resp BP SpO2    07/13/21 1209 07/13/21 1209 07/13/21 1209 07/13/21 1224 07/13/21 1209   97.5 °F (36.4 °C) 102 18 119/79 (!) 88 %      Temp src Heart Rate Source Patient Position BP Location FiO2 (%)   -- -- -- -- --              Physical Exam  GENERAL: Alert, oriented, chronically ill-appearing, not distressed  HENT: head atraumatic, no nuchal rigidity  EYES: no scleral icterus, EOMI  CV: regular rhythm, regular rate, no murmur  RESPIRATORY: normal effort, CTA  ABDOMEN: soft, nontender  MUSCULOSKELETAL: no deformity, FROM, 1+ pedal edema bilaterally  NEURO: alert, moves all extremities, follows commands  SKIN: warm, dry        LAB RESULTS  Recent Results (from the past 24 hour(s))   ECG 12 Lead    Collection Time: 07/13/21 12:32 PM   Result Value Ref Range    QT Interval 376 ms   Comprehensive Metabolic Panel    Collection Time: 07/13/21 12:54 PM    Specimen: Blood   Result Value Ref Range    Glucose 136 (H) 65 - 99 mg/dL    BUN 17 8 - 23 mg/dL    Creatinine 0.87 0.57 - 1.00 mg/dL    Sodium 141 136 - 145 mmol/L    Potassium 5.0 3.5 - 5.2 mmol/L    Chloride 101 98 - 107 mmol/L    CO2 33.8 (H) 22.0 - 29.0 mmol/L    Calcium 8.8 8.6 - 10.5 mg/dL    Total Protein 7.2 6.0 - 8.5 g/dL    Albumin 3.60 3.50 - 5.20 g/dL    ALT (SGPT) 13 1 - 33 U/L    AST (SGOT) 28 1 - 32 U/L    Alkaline Phosphatase 97 39 - 117 U/L    Total Bilirubin 0.3 0.0 - 1.2 mg/dL    eGFR Non African Amer 66 >60 mL/min/1.73    Globulin 3.6 gm/dL    A/G Ratio 1.0 g/dL    BUN/Creatinine Ratio 19.5 7.0 - 25.0    Anion Gap 6.2 5.0 - 15.0 mmol/L   BNP    Collection Time: 07/13/21 12:54 PM    Specimen: Blood   Result Value Ref Range    proBNP 512.9 0.0 - 900.0 pg/mL   Troponin    Collection Time: 07/13/21 12:54 PM    Specimen: Blood   Result Value Ref Range    Troponin T <0.010 0.000 - 0.030 ng/mL   CBC Auto Differential    Collection Time: 07/13/21 12:54 PM    Specimen: Blood   Result Value Ref Range    WBC 6.00 3.40 - 10.80 10*3/mm3    RBC 3.99 3.77 - 5.28 10*6/mm3     Hemoglobin 12.0 12.0 - 15.9 g/dL    Hematocrit 37.4 34.0 - 46.6 %    MCV 93.7 79.0 - 97.0 fL    MCH 30.1 26.6 - 33.0 pg    MCHC 32.1 31.5 - 35.7 g/dL    RDW 13.3 12.3 - 15.4 %    RDW-SD 45.5 37.0 - 54.0 fl    MPV 9.7 6.0 - 12.0 fL    Platelets 186 140 - 450 10*3/mm3    Neutrophil % 66.8 42.7 - 76.0 %    Lymphocyte % 23.5 19.6 - 45.3 %    Monocyte % 6.2 5.0 - 12.0 %    Eosinophil % 2.3 0.3 - 6.2 %    Basophil % 0.7 0.0 - 1.5 %    Immature Grans % 0.5 0.0 - 0.5 %    Neutrophils, Absolute 4.01 1.70 - 7.00 10*3/mm3    Lymphocytes, Absolute 1.41 0.70 - 3.10 10*3/mm3    Monocytes, Absolute 0.37 0.10 - 0.90 10*3/mm3    Eosinophils, Absolute 0.14 0.00 - 0.40 10*3/mm3    Basophils, Absolute 0.04 0.00 - 0.20 10*3/mm3    Immature Grans, Absolute 0.03 0.00 - 0.05 10*3/mm3    nRBC 0.2 0.0 - 0.2 /100 WBC   D-dimer, Quantitative    Collection Time: 07/13/21  2:21 PM    Specimen: Blood   Result Value Ref Range    D-Dimer, Quantitative 0.40 0.00 - 0.49 MCGFEU/mL       Ordered the above labs and independently reviewed the results.        RADIOLOGY  CT Head Without Contrast    Result Date: 7/13/2021  CT SCAN OF THE HEAD WITHOUT CONTRAST  CLINICAL HISTORY: New-onset Global headache.  CT scan of the head was obtained with 3 mm axial images. No intravenous contrast was administered.  FINDINGS:  The ventricles, sulci, and cisterns are age appropriate. The basal ganglia and thalami are unremarkable in appearance. The posterior fossa structures are within normal limits.  Incidental note is made of a partially empty sella.  Incidental note is made of small mucous retention cyst within the right ethmoid air cells.       No evidence for acute intracranial pathology.  Radiation dose reduction techniques were utilized, including automated exposure control and exposure modulation based on body size.  This report was finalized on 7/13/2021 1:51 PM by Dr. Manjinder Grullon M.D.      XR Chest 1 View    Result Date: 7/13/2021  PORTABLE CHEST X-RAY   HISTORY: Shortness of breath.  TECHNIQUE: Portable chest x-ray is correlated with chest x-ray 05/26/2017.  FINDINGS: Even accounting for the apical lordotic projection, the cardiac silhouette is larger today than before. Central pulmonary vasculature appears engorged but no interstitial edema or focal infiltrate is present. There is no effusion or pneumothorax.      Cardiomegaly with central vascular engorgement but no edema.  This report was finalized on 7/13/2021 2:00 PM by Dr. Christofer Landin M.D.        I ordered the above noted radiological studies. Reviewed by me and discussed with radiologist.  See dictation for official radiology interpretation.      MEDICATIONS GIVEN IN ER    Medications   sodium chloride 0.9 % flush 10 mL (has no administration in time range)         PROGRESS, DATA ANALYSIS, CONSULTS, AND MEDICAL DECISION MAKING    All labs have been independently reviewed by me.  All radiology studies have been reviewed by me and discussed with radiologist dictating the report.   EKG's independently viewed and interpreted by me.  Discussion below represents my analysis of pertinent findings related to patient's condition, differential diagnosis, treatment plan and final disposition.    I have discussed case with Dr. Gonzalez, emergency room physician.  He has performed his own bedside examination and agrees with treatment plan.    ED Course as of Jul 13 1647   Tue Jul 13, 2021   1240 Patient presents with several month history of exertional dyspnea, as well as new onset headache.  Differential diagnoses include but not limited to CHF exacerbation, COPD, pneumonia, PE.    [EE]   1300 Chest x-ray interpreted by myself shows severe cardiomegaly.  No obvious infiltrate or effusion.  I will await final radiologist interpretation.    [EE]   1334 WBC: 6.00 [EE]   1335 Hemoglobin: 12.0 [EE]   1335 proBNP: 512.9 [EE]   1335 Troponin T: <0.010 [EE]   1335 Creatinine: 0.87 [EE]   1336 I discussed CT findings of the  head with Dr. rGullon.  Patient has no acute intracranial abnormalities.    [EE]   1600 No clear etiology to patient's dyspnea.  She does have a decreased EF.  I believe she needs to follow-up with pulmonary and cardiology sooner rather than later.  Plan for admission.    [EE]   1637 I discussed case with Dr. Nolan.  He agrees to admit the patient.    [EE]      ED Course User Index  [EE] Frantz Johnson PA       AS OF 16:47 EDT VITALS:    BP - 121/88  HR - 92  TEMP - 97.5 °F (36.4 °C)  O2 SATS - 95%        DIAGNOSIS  Final diagnoses:   Dyspnea on exertion         DISPOSITION  Admitted           Frantz Johnson PA  07/13/21 4780

## 2021-07-13 NOTE — ED NOTES
Patient states she wears oxygen at home, but did not wear any home oxygen to this facility.      Manda Garza RN  07/13/21 5653

## 2021-07-13 NOTE — ED PROVIDER NOTES
I supervised care provided by the midlevel provider.   We have discussed this patient's history, physical exam, and treatment plan.  I have reviewed the note and personally saw and examined the patient and agree with the plan of care.   I have seen and evaluated this patient.  She presents with persistent shortness of breath with exertion.  This is been going on since she was diagnosed with Covid at the end of last year in the beginning of this year.  She was at [facility] and she was intubated.  Since discharge from the hospital she has been on 3 to 5 L of oxygen.  He has had persistent shortness of breath with exertion.  Her symptoms resolve when she is still and she is at rest.  Denies any chest pain.  Denies any fevers or chills.     GENERAL: not distressed at this time.  Her O2 sat is 98% on 2 L.  She is sitting up in bed.  She appears older than her stated age and chronically ill.  HENT: nares patent  Head/neck/ face are symmetric without gross deformity or swelling  EYES: no scleral icterus  CV: regular rhythm, regular rate with intact distal pulses.  Patient has distant heart sounds no obvious murmur appreciated.  RESPIRATORY: normal effort and no respiratory distress.  Very mild expiratory wheeze.  ABDOMEN: soft and non-tender.  Patient is morbidly obese.  MUSCULOSKELETAL: no deformity.  Edema to lower extremities bilateral and symmetric.  Intact distal pulses.  NEURO: alert and appropriate, moves all extremities, follows commands.  No focal motor or sensory changes.  SKIN: warm, dry    Vital signs and nursing notes reviewed.    Plan lab work is unremarkable.  I have reviewed the chest x-ray and EKG.  Is a 1 view chest x-ray and she has the appearance of cardiomegaly.  I compared to her previous x-ray from 2017 which was a 2 view and is cardiomegaly seems to be more pronounced.  No obvious focal infiltrate.  I informed patient and family in the room plan to admit the patient to the hospital.   Currently she is asymptomatic sitting up in the bed.  Vital signs are unremarkable.  All questions answered at this time.  She agrees with that plan.    We are currently under a pandemic from the COVID19 infection.  The patient presented to the emergency department by ambulance or personal vehicle. I followed the current protocols required by Infection Control at [facility] in my evaluation and treatment of the patient. The patient was wearing a face mask during my evaluation and throughout my encounter. During my whole encounter with this patient I used appropriate personal protective equipment.  This equipment consisted of eye protection, facemask, gown, and gloves.  I applied this equipment before entering the room.    I reviewed old records from [facility] hospitalization and discharge in January of this year.  Patient was discharged January 17, 2021.  She presented to [facility] with Covid symptoms.  She ultimately needed to be transferred to the ICU intubated.  She was extubated.  She has been placed upon Pradaxa for A. fib.  Her echo showed an EF of 42% in looking at those records.  I reviewed her discharge medicine.  She is on a beta agonist, oxygen which she states she takes 3 to 5 L.  Aspirin, Coreg, Pradaxa, Lasix, Topamax, Lipitor, Flonase, Avapro, Synthroid, Zoloft.     Aniket Gonzalez MD  07/13/21 9504

## 2021-07-14 ENCOUNTER — APPOINTMENT (OUTPATIENT)
Dept: CARDIOLOGY | Facility: HOSPITAL | Age: 61
End: 2021-07-14

## 2021-07-14 LAB
ALBUMIN SERPL-MCNC: 3.6 G/DL (ref 3.5–5.2)
ALBUMIN/GLOB SERPL: 1.1 G/DL
ALP SERPL-CCNC: 87 U/L (ref 39–117)
ALT SERPL W P-5'-P-CCNC: 12 U/L (ref 1–33)
ANION GAP SERPL CALCULATED.3IONS-SCNC: 8.9 MMOL/L (ref 5–15)
AORTIC DIMENSIONLESS INDEX: 0.8 (DI)
AST SERPL-CCNC: 8 U/L (ref 1–32)
BH CV ECHO MEAS - AO MAX PG: 7 MMHG
BH CV ECHO MEAS - AO MEAN PG (FULL): 2 MMHG
BH CV ECHO MEAS - AO MEAN PG: 4 MMHG
BH CV ECHO MEAS - AO ROOT AREA (BSA CORRECTED): 1.2
BH CV ECHO MEAS - AO ROOT AREA: 7.5 CM^2
BH CV ECHO MEAS - AO ROOT DIAM: 3.1 CM
BH CV ECHO MEAS - AO V2 MAX: 130.9 CM/SEC
BH CV ECHO MEAS - AO V2 MEAN: 97 CM/SEC
BH CV ECHO MEAS - AO V2 VTI: 21.3 CM
BH CV ECHO MEAS - AVA(I,A): 3.3 CM^2
BH CV ECHO MEAS - AVA(I,D): 3.3 CM^2
BH CV ECHO MEAS - BSA(HAYCOCK): 2.9 M^2
BH CV ECHO MEAS - BSA: 2.6 M^2
BH CV ECHO MEAS - BZI_BMI: 58 KILOGRAMS/M^2
BH CV ECHO MEAS - BZI_METRIC_HEIGHT: 170.2 CM
BH CV ECHO MEAS - BZI_METRIC_WEIGHT: 167.8 KG
BH CV ECHO MEAS - EDV(CUBED): 166.4 ML
BH CV ECHO MEAS - EDV(MOD-SP2): 171 ML
BH CV ECHO MEAS - EDV(MOD-SP4): 177 ML
BH CV ECHO MEAS - EDV(TEICH): 147.4 ML
BH CV ECHO MEAS - EF(CUBED): 74.2 %
BH CV ECHO MEAS - EF(MOD-BP): 59 %
BH CV ECHO MEAS - EF(MOD-SP2): 60.8 %
BH CV ECHO MEAS - EF(MOD-SP4): 61.6 %
BH CV ECHO MEAS - EF(TEICH): 65.5 %
BH CV ECHO MEAS - ESV(CUBED): 42.9 ML
BH CV ECHO MEAS - ESV(MOD-SP2): 67 ML
BH CV ECHO MEAS - ESV(MOD-SP4): 68 ML
BH CV ECHO MEAS - ESV(TEICH): 50.9 ML
BH CV ECHO MEAS - FS: 36.4 %
BH CV ECHO MEAS - IVS/LVPW: 1.1
BH CV ECHO MEAS - IVSD: 1.2 CM
BH CV ECHO MEAS - LAT PEAK E' VEL: 11.7 CM/SEC
BH CV ECHO MEAS - LV DIASTOLIC VOL/BSA (35-75): 67.4 ML/M^2
BH CV ECHO MEAS - LV MASS(C)D: 257 GRAMS
BH CV ECHO MEAS - LV MASS(C)DI: 97.9 GRAMS/M^2
BH CV ECHO MEAS - LV MAX PG: 3.6 MMHG
BH CV ECHO MEAS - LV MEAN PG: 2 MMHG
BH CV ECHO MEAS - LV SYSTOLIC VOL/BSA (12-30): 25.9 ML/M^2
BH CV ECHO MEAS - LV V1 MAX: 94.8 CM/SEC
BH CV ECHO MEAS - LV V1 MEAN: 61.2 CM/SEC
BH CV ECHO MEAS - LV V1 VTI: 17.1 CM
BH CV ECHO MEAS - LVIDD: 5.5 CM
BH CV ECHO MEAS - LVIDS: 3.5 CM
BH CV ECHO MEAS - LVLD AP2: 8.5 CM
BH CV ECHO MEAS - LVLD AP4: 8.6 CM
BH CV ECHO MEAS - LVLS AP2: 6.4 CM
BH CV ECHO MEAS - LVLS AP4: 7.2 CM
BH CV ECHO MEAS - LVOT AREA (M): 4.2 CM^2
BH CV ECHO MEAS - LVOT AREA: 4.2 CM^2
BH CV ECHO MEAS - LVOT DIAM: 2.3 CM
BH CV ECHO MEAS - LVPWD: 1.1 CM
BH CV ECHO MEAS - MED PEAK E' VEL: 7.8 CM/SEC
BH CV ECHO MEAS - MV DEC SLOPE: 536 CM/SEC^2
BH CV ECHO MEAS - MV DEC TIME: 119 SEC
BH CV ECHO MEAS - MV E MAX VEL: 103 CM/SEC
BH CV ECHO MEAS - MV MEAN PG: 3 MMHG
BH CV ECHO MEAS - MV P1/2T MAX VEL: 127 CM/SEC
BH CV ECHO MEAS - MV P1/2T: 69.4 MSEC
BH CV ECHO MEAS - MV V2 MEAN: 82 CM/SEC
BH CV ECHO MEAS - MV V2 VTI: 25.4 CM
BH CV ECHO MEAS - MVA P1/2T LCG: 1.7 CM^2
BH CV ECHO MEAS - MVA(P1/2T): 3.2 CM^2
BH CV ECHO MEAS - MVA(VTI): 2.8 CM^2
BH CV ECHO MEAS - PA ACC SLOPE: 987 CM/SEC^2
BH CV ECHO MEAS - PA ACC TIME: 0.1 SEC
BH CV ECHO MEAS - PA MAX PG: 4.2 MMHG
BH CV ECHO MEAS - PA PR(ACCEL): 33.1 MMHG
BH CV ECHO MEAS - PA V2 MAX: 103 CM/SEC
BH CV ECHO MEAS - PULM DIAS VEL: 36.6 CM/SEC
BH CV ECHO MEAS - PULM S/D: 0.77
BH CV ECHO MEAS - PULM SYS VEL: 28.2 CM/SEC
BH CV ECHO MEAS - RAP SYSTOLE: 8 MMHG
BH CV ECHO MEAS - RV MEAN PG: 1 MMHG
BH CV ECHO MEAS - RV V1 MEAN: 43 CM/SEC
BH CV ECHO MEAS - RV V1 VTI: 13.7 CM
BH CV ECHO MEAS - SI(AO): 61.2 ML/M^2
BH CV ECHO MEAS - SI(CUBED): 47 ML/M^2
BH CV ECHO MEAS - SI(LVOT): 27.1 ML/M^2
BH CV ECHO MEAS - SI(MOD-SP2): 39.6 ML/M^2
BH CV ECHO MEAS - SI(MOD-SP4): 41.5 ML/M^2
BH CV ECHO MEAS - SI(TEICH): 36.8 ML/M^2
BH CV ECHO MEAS - SV(AO): 160.8 ML
BH CV ECHO MEAS - SV(CUBED): 123.5 ML
BH CV ECHO MEAS - SV(LVOT): 71 ML
BH CV ECHO MEAS - SV(MOD-SP2): 104 ML
BH CV ECHO MEAS - SV(MOD-SP4): 109 ML
BH CV ECHO MEAS - SV(TEICH): 96.6 ML
BH CV ECHO MEAS - TAPSE (>1.6): 1.8 CM
BH CV ECHO MEASUREMENTS AVERAGE E/E' RATIO: 10.56
BH CV XLRA - RV BASE: 3.9 CM
BH CV XLRA - RV LENGTH: 8.2 CM
BH CV XLRA - RV MID: 2.6 CM
BH CV XLRA - TDI S': 11.1 CM/SEC
BILIRUB SERPL-MCNC: 0.2 MG/DL (ref 0–1.2)
BUN SERPL-MCNC: 17 MG/DL (ref 8–23)
BUN/CREAT SERPL: 24.6 (ref 7–25)
CALCIUM SPEC-SCNC: 8.9 MG/DL (ref 8.6–10.5)
CHLORIDE SERPL-SCNC: 100 MMOL/L (ref 98–107)
CO2 SERPL-SCNC: 34.1 MMOL/L (ref 22–29)
CREAT SERPL-MCNC: 0.69 MG/DL (ref 0.57–1)
DEPRECATED RDW RBC AUTO: 48.3 FL (ref 37–54)
ERYTHROCYTE [DISTWIDTH] IN BLOOD BY AUTOMATED COUNT: 13.2 % (ref 12.3–15.4)
GFR SERPL CREATININE-BSD FRML MDRD: 86 ML/MIN/1.73
GLOBULIN UR ELPH-MCNC: 3.3 GM/DL
GLUCOSE SERPL-MCNC: 128 MG/DL (ref 65–99)
HCT VFR BLD AUTO: 37.6 % (ref 34–46.6)
HGB BLD-MCNC: 11.6 G/DL (ref 12–15.9)
LEFT ATRIUM VOLUME INDEX: 35.4 ML/M2
MAXIMAL PREDICTED HEART RATE: 158 BPM
MCH RBC QN AUTO: 30.4 PG (ref 26.6–33)
MCHC RBC AUTO-ENTMCNC: 30.9 G/DL (ref 31.5–35.7)
MCV RBC AUTO: 98.4 FL (ref 79–97)
PLATELET # BLD AUTO: 180 10*3/MM3 (ref 140–450)
PMV BLD AUTO: 9.6 FL (ref 6–12)
POTASSIUM SERPL-SCNC: 3.8 MMOL/L (ref 3.5–5.2)
PROT SERPL-MCNC: 6.9 G/DL (ref 6–8.5)
QT INTERVAL: 372 MS
QT INTERVAL: 396 MS
RBC # BLD AUTO: 3.82 10*6/MM3 (ref 3.77–5.28)
SODIUM SERPL-SCNC: 143 MMOL/L (ref 136–145)
STRESS TARGET HR: 134 BPM
WBC # BLD AUTO: 7 10*3/MM3 (ref 3.4–10.8)

## 2021-07-14 PROCEDURE — 93010 ELECTROCARDIOGRAM REPORT: CPT | Performed by: INTERNAL MEDICINE

## 2021-07-14 PROCEDURE — 93306 TTE W/DOPPLER COMPLETE: CPT | Performed by: INTERNAL MEDICINE

## 2021-07-14 PROCEDURE — 94799 UNLISTED PULMONARY SVC/PX: CPT

## 2021-07-14 PROCEDURE — 93005 ELECTROCARDIOGRAM TRACING: CPT | Performed by: INTERNAL MEDICINE

## 2021-07-14 PROCEDURE — 99213 OFFICE O/P EST LOW 20 MIN: CPT | Performed by: INTERNAL MEDICINE

## 2021-07-14 PROCEDURE — 93306 TTE W/DOPPLER COMPLETE: CPT

## 2021-07-14 PROCEDURE — 80053 COMPREHEN METABOLIC PANEL: CPT | Performed by: HOSPITALIST

## 2021-07-14 PROCEDURE — G0378 HOSPITAL OBSERVATION PER HR: HCPCS

## 2021-07-14 PROCEDURE — 85027 COMPLETE CBC AUTOMATED: CPT | Performed by: HOSPITALIST

## 2021-07-14 PROCEDURE — 25010000002 PERFLUTREN (DEFINITY) 8.476 MG IN SODIUM CHLORIDE (PF) 0.9 % 10 ML INJECTION: Performed by: INTERNAL MEDICINE

## 2021-07-14 PROCEDURE — 96374 THER/PROPH/DIAG INJ IV PUSH: CPT

## 2021-07-14 PROCEDURE — 25010000002 FUROSEMIDE PER 20 MG: Performed by: HOSPITALIST

## 2021-07-14 RX ORDER — PANTOPRAZOLE SODIUM 40 MG/1
40 TABLET, DELAYED RELEASE ORAL
Status: DISCONTINUED | OUTPATIENT
Start: 2021-07-14 | End: 2021-07-16 | Stop reason: HOSPADM

## 2021-07-14 RX ORDER — FUROSEMIDE 10 MG/ML
40 INJECTION INTRAMUSCULAR; INTRAVENOUS EVERY 12 HOURS
Status: DISCONTINUED | OUTPATIENT
Start: 2021-07-14 | End: 2021-07-16

## 2021-07-14 RX ORDER — DABIGATRAN ETEXILATE 150 MG/1
150 CAPSULE ORAL EVERY 12 HOURS
Status: DISCONTINUED | OUTPATIENT
Start: 2021-07-14 | End: 2021-07-16 | Stop reason: HOSPADM

## 2021-07-14 RX ORDER — ESCITALOPRAM OXALATE 10 MG/1
10 TABLET ORAL NIGHTLY
Status: DISCONTINUED | OUTPATIENT
Start: 2021-07-14 | End: 2021-07-16 | Stop reason: HOSPADM

## 2021-07-14 RX ORDER — LEVOTHYROXINE SODIUM 0.15 MG/1
150 TABLET ORAL DAILY
Status: DISCONTINUED | OUTPATIENT
Start: 2021-07-14 | End: 2021-07-15

## 2021-07-14 RX ORDER — FUROSEMIDE 20 MG/1
20 TABLET ORAL
Status: DISCONTINUED | OUTPATIENT
Start: 2021-07-14 | End: 2021-07-14

## 2021-07-14 RX ORDER — FUROSEMIDE 10 MG/ML
40 INJECTION INTRAMUSCULAR; INTRAVENOUS ONCE
Status: DISCONTINUED | OUTPATIENT
Start: 2021-07-14 | End: 2021-07-14

## 2021-07-14 RX ORDER — PANTOPRAZOLE SODIUM 40 MG/1
40 TABLET, DELAYED RELEASE ORAL EVERY MORNING
Status: DISCONTINUED | OUTPATIENT
Start: 2021-07-15 | End: 2021-07-14

## 2021-07-14 RX ORDER — LOSARTAN POTASSIUM 100 MG/1
100 TABLET ORAL
Status: DISCONTINUED | OUTPATIENT
Start: 2021-07-14 | End: 2021-07-15

## 2021-07-14 RX ORDER — ASPIRIN 81 MG/1
81 TABLET, CHEWABLE ORAL DAILY
Status: DISCONTINUED | OUTPATIENT
Start: 2021-07-14 | End: 2021-07-16 | Stop reason: HOSPADM

## 2021-07-14 RX ORDER — SERTRALINE HYDROCHLORIDE 100 MG/1
100 TABLET, FILM COATED ORAL NIGHTLY
Status: DISCONTINUED | OUTPATIENT
Start: 2021-07-14 | End: 2021-07-16 | Stop reason: HOSPADM

## 2021-07-14 RX ORDER — CARVEDILOL 25 MG/1
25 TABLET ORAL 2 TIMES DAILY WITH MEALS
Status: DISCONTINUED | OUTPATIENT
Start: 2021-07-14 | End: 2021-07-16 | Stop reason: HOSPADM

## 2021-07-14 RX ADMIN — IPRATROPIUM BROMIDE AND ALBUTEROL SULFATE 3 ML: 2.5; .5 SOLUTION RESPIRATORY (INHALATION) at 00:24

## 2021-07-14 RX ADMIN — IPRATROPIUM BROMIDE AND ALBUTEROL SULFATE 3 ML: 2.5; .5 SOLUTION RESPIRATORY (INHALATION) at 10:24

## 2021-07-14 RX ADMIN — ESCITALOPRAM 10 MG: 10 TABLET, FILM COATED ORAL at 20:03

## 2021-07-14 RX ADMIN — PANTOPRAZOLE SODIUM 40 MG: 40 TABLET, DELAYED RELEASE ORAL at 21:25

## 2021-07-14 RX ADMIN — SERTRALINE 100 MG: 100 TABLET, FILM COATED ORAL at 20:03

## 2021-07-14 RX ADMIN — IPRATROPIUM BROMIDE AND ALBUTEROL SULFATE 3 ML: 2.5; .5 SOLUTION RESPIRATORY (INHALATION) at 03:06

## 2021-07-14 RX ADMIN — PERFLUTREN 2 ML: 6.52 INJECTION, SUSPENSION INTRAVENOUS at 18:57

## 2021-07-14 RX ADMIN — DABIGATRAN ETEXILATE MESYLATE 150 MG: 150 CAPSULE ORAL at 14:34

## 2021-07-14 RX ADMIN — IPRATROPIUM BROMIDE AND ALBUTEROL SULFATE 3 ML: 2.5; .5 SOLUTION RESPIRATORY (INHALATION) at 15:19

## 2021-07-14 RX ADMIN — LEVOTHYROXINE SODIUM 150 MCG: 0.15 TABLET ORAL at 14:34

## 2021-07-14 RX ADMIN — IPRATROPIUM BROMIDE AND ALBUTEROL SULFATE 3 ML: 2.5; .5 SOLUTION RESPIRATORY (INHALATION) at 06:31

## 2021-07-14 RX ADMIN — LOSARTAN POTASSIUM 100 MG: 100 TABLET, FILM COATED ORAL at 14:35

## 2021-07-14 RX ADMIN — ASPIRIN 81 MG: 81 TABLET, CHEWABLE ORAL at 13:57

## 2021-07-14 RX ADMIN — FUROSEMIDE 40 MG: 10 INJECTION, SOLUTION INTRAMUSCULAR; INTRAVENOUS at 18:40

## 2021-07-14 RX ADMIN — CARVEDILOL 25 MG: 25 TABLET, FILM COATED ORAL at 18:41

## 2021-07-14 NOTE — PLAN OF CARE
Problem: Adult Inpatient Plan of Care  Goal: Plan of Care Review  Outcome: Ongoing, Progressing  Flowsheets (Taken 7/14/2021 0330)  Progress: no change  Plan of Care Reviewed With: patient  Outcome Summary: VSS, on 3-4L. No c/o pain or discomfort. Echo, stress test, and CT chest to be done. Home meds restarted. Will continue to monitor.

## 2021-07-14 NOTE — H&P
History and physical    Primary care physician  Dr. AMOR    Chief complaint  Shortness of breath    History of present illness  61-year-old white female with history of chronic hypoxic respiratory failure on home oxygen also has COPD congestive heart failure coronary artery disease morbidly obese hypothyroidism who also has had COVID-19 recently and was on ventilator and was discharged after stabilization presented to [facility] emergency room with shortness of breath for last several days to weeks.  Patient also have leg swelling.  Patient denies any fever chills but does have headache.  Patient denies any chest pain but has palpitation off and on.  Patient evaluated in ER found to be in acute on chronic hypoxic story failure admit for management.     PAST MEDICAL HISTORY  • Chronic hypoxic respiratory failure     • CHF      • COPD      • Coronary artery disease     • Recent COVID-19     • Hypothyroidism  Morbidly obese        PAST SURGICAL HISTORY              Procedure Laterality Date   • BREAST SURGERY       • CHOLECYSTECTOMY   26+ years ago   • KNEE ARTHROSCOPY Right 2013         FAMILY HISTORY           Problem Relation Age of Onset   • Cancer Mother     • COPD Father     • Heart disease Father     • Hypertension Father        SOCIAL HISTORY                 Socioeconomic History   • Marital status: Single       Spouse name: Not on file   • Number of children: Not on file   • Years of education: Not on file   • Highest education level: Not on file   Tobacco Use   • Smoking status: Former Smoker       Packs/day: 1.50       Years: 20.00       Pack years: 30.00       Quit date: 3/23/2011       Years since quitting: 10.3   • Smokeless tobacco: Never Used   Vaping Use   • Vaping Use: Never used   Substance and Sexual Activity   • Alcohol use: No   • Drug use: No   • Sexual activity: Defer       Birth control/protection: Post-menopausal         ALLERGIES  Patient has no known allergies.  Home  "medications reviewed     REVIEW OF SYSTEMS  All systems reviewed and negative except for those discussed in HPI.      PHYSICAL EXAM   Blood pressure 131/85, pulse 112, temperature 98.7 °F (37.1 °C), temperature source Oral, resp. rate 18, height 170.2 cm (67\"), weight (!) 168 kg (370 lb), SpO2 93 %.    GENERAL: Alert, oriented, chronically ill-appearing, not distressed  HENT: head atraumatic, no nuchal rigidity  EYES: no scleral icterus, EOMI  CV: regular rhythm, regular rate, no murmur  RESPIRATORY: normal effort, CTA  ABDOMEN: soft, nontender bowel sounds positive  MUSCULOSKELETAL: no deformity, FROM, 1+ pedal edema bilaterally  NEURO: alert, moves all extremities, follows commands  SKIN: warm, dry     LAB RESULTS  Lab Results (last 24 hours)     Procedure Component Value Units Date/Time    Comprehensive Metabolic Panel [[id number]]  (Abnormal) Collected: 07/14/21 0500    Specimen: Blood Updated: 07/14/21 0650     Glucose 128 mg/dL      BUN 17 mg/dL      Creatinine 0.69 mg/dL      Sodium 143 mmol/L      Potassium 3.8 mmol/L      Chloride 100 mmol/L      CO2 34.1 mmol/L      Calcium 8.9 mg/dL      Total Protein 6.9 g/dL      Albumin 3.60 g/dL      ALT (SGPT) 12 U/L      AST (SGOT) 8 U/L      Alkaline Phosphatase 87 U/L      Total Bilirubin 0.2 mg/dL      eGFR Non African Amer 86 mL/min/1.73      Globulin 3.3 gm/dL      A/G Ratio 1.1 g/dL      BUN/Creatinine Ratio 24.6     Anion Gap 8.9 mmol/L     Narrative:      GFR Normal >60  Chronic Kidney Disease <60  Kidney Failure <15      CBC (No Diff) [[id number]]  (Abnormal) Collected: 07/14/21 0500    Specimen: Blood Updated: 07/14/21 0637     WBC 7.00 10*3/mm3      RBC 3.82 10*6/mm3      Hemoglobin 11.6 g/dL      Hematocrit 37.6 %      MCV 98.4 fL      MCH 30.4 pg      MCHC 30.9 g/dL      RDW 13.2 %      RDW-SD 48.3 fl      MPV 9.6 fL      Platelets 180 10*3/mm3     COVID PRE-OP / PRE-PROCEDURE SCREENING ORDER (NO ISOLATION) - Swab, Nasopharynx [[id number]]  (Normal) " Collected: 07/13/21 1657    Specimen: Swab from Nasopharynx Updated: 07/13/21 2209    Narrative:      The following orders were created for panel order COVID PRE-OP / PRE-PROCEDURE SCREENING ORDER (NO ISOLATION) - Swab, Nasopharynx.  Procedure                               Abnormality         Status                     ---------                               -----------         ------                     COVID-19,APTIMA PANTHER,...[[id number]]  Normal              Final result                 Please view results for these tests on the individual orders.    COVID-19,APTIMA PANTHER,[facility] IN-HOUSE, NP/OP SWAB IN UTM/VTM/SALINE TRANSPORT MEDIA,24 HR TAT - Swab, Nasopharynx [[id number]]  (Normal) Collected: 07/13/21 1657    Specimen: Swab from Nasopharynx Updated: 07/13/21 2209     COVID19 Not Detected    Narrative:      Fact sheet for providers: https://www.fda.gov/media/384203/download     Fact sheet for patients: https://www.fda.gov/media/059544/download    Test performed by RT PCR.    Potassium [[id number]]  (Normal) Collected: 07/13/21 2121    Specimen: Blood Updated: 07/13/21 2207     Potassium 4.1 mmol/L     Magnesium [[id number]]  (Normal) Collected: 07/13/21 2121    Specimen: Blood Updated: 07/13/21 2203     Magnesium 2.2 mg/dL            Study Result    Narrative & Impression   PORTABLE CHEST X-RAY     HISTORY: Shortness of breath.     TECHNIQUE: Portable chest x-ray is correlated with chest x-ray  05/26/2017.     FINDINGS: Even accounting for the apical lordotic projection, the  cardiac silhouette is larger today than before. Central pulmonary  vasculature appears engorged but no interstitial edema or focal  infiltrate is present. There is no effusion or pneumothorax.     IMPRESSION:  Cardiomegaly with central vascular engorgement but no edema.     This report was finalized on 7/13/2021 2:00 PM by Dr. Christofer Landin M.D.           ECG 12 Lead  Component   Ref Range & Units 7/14/21 0859 7/13/21 2051   QT Interval    ms 372 P  396 P           HEART RATE= 89  bpm  RR Interval= 674  ms  NH Interval=   ms  P Horizontal Axis=   deg  P Front Axis=   deg  QRSD Interval= 112  ms  QT Interval= 372  ms  QRS Axis= -44  deg  T Wave Axis= 20  deg  - ABNORMAL ECG -  Atrial fibrillation  Borderline IVCD with LAD  Borderline T abnormalities, anterior leads  Electronically Signed By:              Current Facility-Administered Medications:   •  aspirin chewable tablet 81 mg, 81 mg, Oral, Daily, Flip Nolan MD  •  carvedilol (COREG) tablet 25 mg, 25 mg, Oral, BID With Meals, Flip Nolan MD  •  dabigatran etexilate (PRADAXA) capsule 150 mg, 150 mg, Oral, Q12H, Flip Nolan MD  •  escitalopram (LEXAPRO) tablet 10 mg, 10 mg, Oral, Nightly, Flip Nolan MD  •  furosemide (LASIX) tablet 20 mg, 20 mg, Oral, BID, Flip Nolan MD  •  ipratropium-albuterol (DUO-NEB) nebulizer solution 3 mL, 3 mL, Nebulization, Q4H - RT, Flip Nolan MD, 3 mL at 07/14/21 1024  •  levothyroxine (SYNTHROID, LEVOTHROID) tablet 150 mcg, 150 mcg, Oral, Daily, Flip Nolan MD  •  losartan (COZAAR) tablet 100 mg, 100 mg, Oral, Q24H, Flip Nolan MD  •  [START ON 7/15/2021] pantoprazole (PROTONIX) EC tablet 40 mg, 40 mg, Oral, QAM, Flip Nolan MD  •  sertraline (ZOLOFT) tablet 100 mg, 100 mg, Oral, Nightly, Flip Nolan MD  •  [COMPLETED] Insert peripheral IV, , , Once **AND** sodium chloride 0.9 % flush 10 mL, 10 mL, Intravenous, PRN, Frantz Johnson PA     ASSESSMENT  Acute on chronic hypoxic respiratory failure  Acute on chronic diastolic congestive heart failure  Paroxysmal atrial fibrillation on Pradaxa  Hypertension  Hypothyroidism  Obesity obese  Depression  Gastroesophageal reflux disease    PLAN  Admit  Supplement oxygen  Nebulizer  IV diuresis  Serial cardiac enzymes EKG  Check 2D echo  Cardiology and pulmonary consult  Adjust home medications  Stress ulcer DVT prophylaxis  Supportive care  Patient is full code  Discussed with family and nursing  staff  Follow closely further recommendation current hospital course    LISA LOBO MD

## 2021-07-14 NOTE — DISCHARGE PLACEMENT REQUEST
"Asim Zapien (61 y.o. Female)     Date of Birth Social Security Number Address Home Phone MRN    1960  [id number] [place] [id number] [phone] [id number]    Confucianist Marital Status          Unknown Single       Admission Date Admission Type Admitting Provider Attending Provider Department, Room/Bed    7/13/21 Emergency Flip Nolan MD Ahmed, Aftab, MD [address], [address]    Discharge Date Discharge Disposition Discharge Destination                       Attending Provider: Flip Nolan MD    Allergies: No Known Allergies    Isolation: None   Infection: None   Code Status: CPR    Ht: 170.2 cm (67\")   Wt: 168 kg (370 lb)    Admission Cmt: None   Principal Problem: None                Active Insurance as of 7/13/2021     Primary Coverage     Payor Plan Insurance Group Employer/Plan Group    [employer] [employer] [id number]     Payor Plan Address Payor Plan Phone Number Payor Plan Fax Number Effective Dates     [id number] [phone]  1/1/2018 - None Entered    [place] [address]       Subscriber Name Subscriber Birth Date Member ID       ASIM ZAPIEN 1960 [id number]                 Emergency Contacts      (Rel.) Home Phone Work Phone Mobile Phone    Malcom Salas (Partner) [phone] -- [phone]              "

## 2021-07-14 NOTE — PROGRESS NOTES
Attempted to see patient however she is off floor for studies and unable for exam.  Chart reviewed, vascular congestion on cxr cardiomegaly, agree with echo and cardiac work up/cardiology consult.  D dimer negative.  Will order ct chest to evaluate for post covid pulmonary fibrosis.  Will follow up in morning.  If any issues or change in clinical status overnight please page LPC on call, who can be available at minutes notice for any changes.    D/w Rn.    Colby Kennedy MD  [facility]  07/14/21  17:18 EDT

## 2021-07-14 NOTE — CASE MANAGEMENT/SOCIAL WORK
Continued Stay Note  Psychiatric     Patient Name: Anne Gentile  MRN: [id number]  Today's Date: 7/14/2021    Admit Date: 7/13/2021    Discharge Plan     Row Name 07/14/21 1622       Plan    Plan  Home with rommate. If HH  needed wants [facility] . Has home O2@3LNC that she wears at night through [employer]. Would need nebulizer machine if D/C on nebs. PT eval pending    Patient/Family in Agreement with Plan  yes    Plan Comments  Met with pt. at bedside. Explained roll of . Face sheet and pharmacy verified. Pt lives with her roommate Malcom Salas. Pt is employed with [employer]. 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 DME equipment includes a rollator; grab bars; WC; handicap toilet; cane; and home O2 that she uses at night @3LNC through [employer].  If D/C on nebulizers will need nebulizer machine and [employer] to provide. Pt states she completes ADLs with her rollator and requires frequent rest breaks between activities. Pt has never been to Rehab or used HH in the past. If HH needed at D/C wants [facility]. Pt’s PCP is Dr. OLIVIER Stewart. Pt enrolled with [facility]. Pt states she continues to drive. At discharge, family will transport. Pt states she was at [facility] in Jan 2021 with Covid 19. States she was on vent for 4 days and since then has had SOB causing difficulty with daily living activities. Explained that CCP would follow to assess for discharge needs.  Varun Ayala RN-BC        Discharge Codes    No documentation.             Varun Ayala RN

## 2021-07-14 NOTE — PLAN OF CARE
Goal Outcome Evaluation:  Plan of Care Reviewed With: (P) patient        Progress: (P) no change   Pt admitted with c/o SOA worse with activity. Pulmonology consulted. Pt A&Ox3, up ab adrianna, 2L NC. Denies any pain. Pt is afib on monitor. BLE +3 edema. Will ctm

## 2021-07-14 NOTE — CONSULTS
[facility]  Cardiology Consult Note    Patient Identification:  Name: Anne Gentile  Age: 61 y.o.  Sex: female  :  1960  MRN: [id number]             Requesting Physician: Dr. Nolan    Reason for Consultation / Chief Complaint: management recommendations shortness of breath    History of Present Illness:   61-year-old female has been complaining of the increasing shortness of breath, recently had a Covid with cardiac arrest, was on the ventilator for 4 days, continues to complains of shortness of breath, better with the inhaler  Shortness of breath on minimum exertion    No chest pains or tightness in the chest    Comorbid cardiac risk factors:     Past Medical History:  Past Medical History:   Diagnosis Date   • Arthritis    • Atrial fibrillation (CMS/HCC)    • CHF (congestive heart failure) (CMS/HCC)    • COPD (chronic obstructive pulmonary disease) (CMS/HCC)    • Coronary artery disease    • COVID-19    • Depression    • Disease of thyroid gland    • Hypertension      Past Surgical History:  Past Surgical History:   Procedure Laterality Date   • BREAST SURGERY     • CHOLECYSTECTOMY  26+ years ago   • KNEE ARTHROSCOPY Right       Allergies:  No Known Allergies  Home Meds:  Medications Prior to Admission   Medication Sig Dispense Refill Last Dose   • aspirin 81 MG chewable tablet Chew 81 mg Daily.   2021 at Unknown time   • carvedilol (COREG) 25 MG tablet TAKE ONE TABLET BY MOUTH TWICE A DAY 60 tablet 0 2021 at Unknown time   • escitalopram (LEXAPRO) 10 MG tablet TAKE ONE TABLET BY MOUTH DAILY   2021 at Unknown time   • furosemide (LASIX) 20 MG tablet TAKE ONE TABLET BY MOUTH DAILY   2021 at Unknown time   • irbesartan (AVAPRO) 300 MG tablet TAKE ONE TABLET BY MOUTH DAILY   2021 at Unknown time   • levothyroxine (SYNTHROID, LEVOTHROID) 150 MCG tablet Take 150 mcg by mouth Daily.   2021 at Unknown time   • nitroglycerin (NITROSTAT) 0.4 MG SL tablet 1 under  the tongue as needed for angina, may repeat q5mins for up three doses 100 tablet 11    • omeprazole (PriLOSEC) 20 MG capsule Take 40 mg by mouth.   7/13/2021 at Unknown time   • Pradaxa 150 MG capsu TAKE ONE CAPSULE BY MOUTH EVERY 12 HOURS 60 capsule 0 7/13/2021 at Unknown time   • sertraline (ZOLOFT) 100 MG tablet    7/13/2021 at Unknown time   • cyclobenzaprine (FLEXERIL) 10 MG tablet       • Diclofenac Sodium (PENNSAID) 2 % solution Apply two pumps to the affected area twice a day prn pain 112 g 12    • topiramate (TOPAMAX) 50 MG tablet TAKE ONE TABLET BY MOUTH DAILY        Current Meds:   [unfilled]  Social History:   Social History     Tobacco Use   • Smoking status: Former Smoker     Packs/day: 1.50     Years: 20.00     Pack years: 30.00     Quit date: 3/23/2011     Years since quitting: 10.3   • Smokeless tobacco: Never Used   Substance Use Topics   • Alcohol use: No      Family History:  Family History   Problem Relation Age of Onset   • Cancer Mother    • COPD Father    • Heart disease Father    • Hypertension Father         Review of Systems    Constitutional: No weakness,fatigue, fever, rigors, chills   Eyes: No vision changes, eye pain   ENT/oropharynx: No difficulty swallowing, sore throat, epistaxis, changes in hearing   Cardiovascular: No chest pain, chest tightness, palpitations, paroxysmal nocturnal dyspnea, orthopnea, diaphoresis, dizziness / syncopal episode   Respiratory:  Moderate shortness of breath, dyspnea on exertion, cough, wheezing hemoptysis   Gastrointestinal: No abdominal pain, nausea, vomiting, diarrhea, bloody stools   Genitourinary: No hematuria, dysuria   Neurological: No headache, tremors, numbness,  one-sided weakness    Musculoskeletal: No cramps, myalgias,  joint pain, joint swelling   Integument: No rash, edema           Constitutional:  Temp:  [97.5 °F (36.4 °C)-98.7 °F (37.1 °C)] 98.7 °F (37.1 °C)  Heart Rate:  [] 112  Resp:  [16-20] 18  BP: (106-134)/(74-99)  131/85    Physical Exam   General:  Appears in no acute distress  Eyes: PERTL,  HEENT:  No JVD. Thyroid not visibly enlarged. No mucosal pallor or cyanosis  Respiratory: Respirations regular and unlabored at rest. BBS with good air entry in all fields. No crackles, rubs or wheezes auscultated  Cardiovascular: S1S2 Regular rate and rhythm. No murmur, rub or gallop auscultated. No carotid bruits. DP/PT pulses    . No pretibial pitting edema  Gastrointestinal: Abdomen soft, flat, non tender. Bowel sounds present. No hepatosplenomegaly. No ascites  Musculoskeletal: BAUER x4. No abnormal movements  Extremities: No digital clubbing or cyanosis  Skin: Color pink. Skin warm and dry to touch. No rashes  No xanthoma  Neuro: AAO x3 CN II-XII grossly intact              Cardiographics  ECG:     Telemetry:    Echocardiogram:     Imaging  Chest X-ray:     Lab Review   Results from last 7 days   Lab Units 07/13/21  1254   TROPONIN T ng/mL <0.010     Results from last 7 days   Lab Units 07/13/21  2121   MAGNESIUM mg/dL 2.2     Results from last 7 days   Lab Units 07/14/21  0500   SODIUM mmol/L 143   POTASSIUM mmol/L 3.8   BUN mg/dL 17   CREATININE mg/dL 0.69   CALCIUM mg/dL 8.9     @[id number]@  Results from last 7 days   Lab Units 07/14/21  0500 07/13/21  1254   WBC 10*3/mm3 7.00 6.00   HEMOGLOBIN g/dL 11.6* 12.0   HEMATOCRIT % 37.6 37.4   PLATELETS 10*3/mm3 180 186             Assessment:  Shortness of breath  Respiratory insufficiency  Obesity  Hypertension  Recommendations / Plan:   Get pulmonary consult  Stress test tomorrow  Check echocardiogram  Repeat EKG and troponin  Further work-up and recommendations as needed  Labs/tests ordered for am      Filemon Kumari MD  7/14/2021, 08:21 EDT      EMR Dragon/Transcription:   Dictated utilizing Dragon dictation

## 2021-07-14 NOTE — CASE MANAGEMENT/SOCIAL WORK
Discharge Planning Assessment  [facility]     Patient Name: Anne Gentile  MRN: [id number]  Today's Date: 7/14/2021    Admit Date: 7/13/2021    Discharge Needs Assessment     Row Name 07/14/21 1607       Living Environment    Lives With  other (see comments)    Unique Family Situation  States she lives with her roommate Malcom Salas    Current Living Arrangements  home/apartment/condo    Primary Care Provided by  self    Provides Primary Care For  no one, unable/limited ability to care for self    Family Caregiver if Needed  other (see comments) roommate Malcom Salas    Quality of Family Relationships  unable to assess    Able to Return to Prior Arrangements  yes       Resource/Environmental Concerns    Resource/Environmental Concerns  none       Transition Planning    Patient/Family Anticipates Transition to  home with family    Patient/Family Anticipated Services at Transition  home health care    Transportation Anticipated  family or friend will provide       Discharge Needs Assessment    Readmission Within the Last 30 Days  no previous admission in last 30 days    Equipment Currently Used at Home  rollator;walker, rolling;wheelchair;grab bar;other (see comments);oxygen Handicap toilet    Concerns to be Addressed  care coordination/care conferences;decision-making;discharge planning    Anticipated Changes Related to Illness  inability to care for self    Equipment Needed After Discharge  none    Discharge Facility/Level of Care Needs  home with home health    Provided Post Acute Provider List?  Refused    Refused Provider List Comment  [facility]ants [facility] to see if needed at D/C    Provided Post Acute Provider Quality & Resource List?  Refused    Current Discharge Risk  chronically ill;lack of support system/caregiver        Discharge Plan     Row Name 07/14/21 8821       Plan    Plan  Home with rommate. If HH  needed [facility]. Has home O2@3LNC that she wears at night through [facility]. Would  need nebulizer machine if D/C on nebs. PT eval pending    Patient/Family in Agreement with Plan  yes    Plan Comments  Met with pt. at bedside. Explained roll of . Face sheet and pharmacy verified. Pt lives with her roommate Malcom Salas. Pt is employed with [employer]. 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 DME equipment includes a rollator; grab bars; WC; handicap toilet; cane; and home O2 that she uses at night @3LNC through [facility].  If D/C on nebulizers will need nebulizer machine and [facility] to provide. Pt states she completes ADLs with her rollator and requires frequent rest breaks between activities. Pt has never been to Rehab or used HH in the past. If HH needed at D/C wants [facility] [place], [facility]. Pt’s PCP is Dr. OLIVIER Stewart. Pt enrolled with [facility] to [facility]. Pt states she continues to drive. At discharge, family will transport. Pt states she was at [facility] in Jan 2021 with Covid 19. States she was on vent for 4 days and since then has had SOB causing difficulty with daily living activities. Explained that CCP would follow to assess for discharge needs.  Varun Ayala RN-BC        Continued Care and Services - Admitted Since 7/13/2021    Coordination has not been started for this encounter.     Selected Continued Care - Episodes Includes selections from active Coordinated Care Management episodes    Rising Risk Care Management Episode start date: 7/14/2021   There are no active outsourced providers for this episode.                 Demographic Summary     Row Name 07/14/21 1600       General Information    Admission Type  observation    Arrived From  emergency department    Reason for Consult  discharge planning;decision-making;care coordination/care conference    Preferred Language  English     Used During This Interaction  no        Functional Status     Row Name 07/14/21 1600       Functional Status    Usual Activity  Tolerance  moderate    Current Activity Tolerance  fair       Functional Status, IADL    Medications  assistive equipment    Meal Preparation  assistive equipment and person    Housekeeping  assistive equipment and person    Laundry  assistive equipment and person    Shopping  assistive equipment and person       Mental Status    General Appearance WDL  WDL       Mental Status Summary    Recent Changes in Mental Status/Cognitive Functioning  no changes       Employment/    Employment Status  employed full-time    Shift Worked  first shift    Current or Previous Occupation  healthcare    Employment/ Comments  Pt is employed with [employer]. 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                Varun Ayala RN

## 2021-07-14 NOTE — CONSULTS
CONSULT NOTE    Patient Identification:  Anne Gentile  61 y.o.  female  1960  [id number]            Requesting physician: Dr Flip Nolan    Reason for Consultation:  ahrf    CC: orozco    History of Present Illness:  Patient is a 61-year-old female who presented to the hospital with shortness of breath.  She says really this is been ongoing for a few months.  Apparently she had very severe COVID-19 pneumonia and was intubated requiring ICU stay in January 2020 currently suffer from a cardiac arrest at that point as well.  She is a former smoker.  She quit in 2011..  This was done at [facility].  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  She described associated symptoms of headache shortness of breath is worse with exertion.  She maintains on nasal cannula and was discharged from the hospital in January on oxygen.  Chest x-ray did show suggestion of cardiomyopathy.    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  She really has not exercised in few years and has gained some weight with this.  However she does state as though the duo nebs have really helped her become less short of breath she have to do little bit more with these.  She has never taken an inhaler before no prior diagnosis of COPD or asthma.    Review of Systems:  CONSTITUTIONAL:  Denies fevers or chills  EYE:  No new vision changes  EAR:  No change in hearing  CARDIAC:  No chest pain  PULMONARY:  No productive cough +shortness of breath  GI:  No diarrhea, hematemesis or hematochezia,  RENAL:  No dysuria or urinary frequency  MUSCULOSKELETAL:    +chronic knee pain  ENDOCRINE:  No heat or cold intolerance  INTEGUMENTARY: No skin rashes  NEUROLOGICAL:  No dizziness or confusion.  No  seizure activity  PSYCHIATRIC:  No new anxiety or depression  12 system review of systems performed and all else negative    Past Medical History:   Diagnosis Date   • Arthritis    • Atrial fibrillation (CMS/HCC)    • CHF (congestive heart failure) (CMS/HCC)    • COPD (chronic obstructive pulmonary disease) (CMS/HCC)    • Coronary artery disease    • COVID-19    • Depression    • Disease of thyroid gland    • Hypertension        Past Surgical History:   Procedure Laterality Date   • BREAST SURGERY     • CHOLECYSTECTOMY  26+ years ago   • KNEE ARTHROSCOPY Right 2013        Medications Prior to Admission   Medication Sig Dispense Refill Last Dose   • aspirin 81 MG chewable tablet Chew 81 mg Daily.   7/13/2021 at Unknown time   • carvedilol (COREG) 25 MG tablet TAKE ONE TABLET BY MOUTH TWICE A DAY 60 tablet 0 7/13/2021 at Unknown time   • escitalopram (LEXAPRO) 10 MG tablet TAKE ONE TABLET BY MOUTH DAILY   7/13/2021 at Unknown time   • furosemide (LASIX) 20 MG tablet TAKE ONE TABLET BY MOUTH DAILY   7/13/2021 at Unknown time   • irbesartan (AVAPRO) 300 MG tablet TAKE ONE TABLET BY MOUTH DAILY   7/13/2021 at Unknown time   • levothyroxine (SYNTHROID, LEVOTHROID) 150 MCG tablet Take 150 mcg by mouth Daily.   7/13/2021 at Unknown time   • nitroglycerin (NITROSTAT) 0.4 MG SL tablet 1 under the tongue as needed for angina, may repeat q5mins for up three doses 100 tablet 11    • omeprazole (PriLOSEC) 20 MG capsule Take 40 mg by mouth.   7/13/2021 at Unknown time   • Pradaxa 150 MG capsu TAKE ONE CAPSULE BY MOUTH EVERY 12 HOURS 60 capsule 0 7/13/2021 at Unknown time   • sertraline (ZOLOFT) 100 MG tablet    7/13/2021 at Unknown time   • cyclobenzaprine (FLEXERIL) 10 MG tablet       • Diclofenac Sodium (PENNSAID) 2 % solution Apply two pumps to the affected area twice a day prn pain 112 g 12    • topiramate (TOPAMAX) 50 MG tablet TAKE ONE TABLET BY MOUTH DAILY          No Known Allergies    Social History     Socioeconomic  "History   • Marital status: Single     Spouse name: Not on file   • Number of children: Not on file   • Years of education: Not on file   • Highest education level: Not on file   Tobacco Use   • Smoking status: Former Smoker     Packs/day: 1.50     Years: 20.00     Pack years: 30.00     Quit date: 3/23/2011     Years since quitting: 10.3   • Smokeless tobacco: Never Used   Vaping Use   • Vaping Use: Never used   Substance and Sexual Activity   • Alcohol use: No   • Drug use: No   • Sexual activity: Defer     Birth control/protection: Post-menopausal       Family History   Problem Relation Age of Onset   • Cancer Mother    • COPD Father    • Heart disease Father    • Hypertension Father        Physical Exam:  /76 (BP Location: Right arm, Patient Position: Sitting)   Pulse 93   Temp 98.5 °F (36.9 °C) (Oral)   Resp 20   Ht 170.2 cm (67\")   Wt (!) 168 kg (370 lb)   SpO2 96%   BMI 57.95 kg/m²   Body mass index is 57.95 kg/m².   General appearance: NAD, conversant   Eyes: anicteric sclerae, moist conjunctivae; no lid-lag;   HENT: Atraumatic; oropharynx Mallampati 4  Neck: Trachea midline; large neck circumference limits exam  Lungs: Distant breath sounds unable to appreciate any crackles or wheeze, with normal respiratory effort and no intercostal retractions  CV: RRR, no MRGs   Abdomen: Morbidly obese bowel sounds positive  Extremities: No peripheral edema or extremity lymphadenopathy  Skin: Normal temperature, turgor and texture; no rash, ulcers or subcutaneous nodules  Psych: Appropriate affect, alert and oriented     Neuro cranial 2-12 grossly tach speech intact moves all extremities    LABS:  Results from last 7 days   Lab Units 07/15/21  0321 07/14/21  0500 07/13/21  1254   WBC 10*3/mm3 6.17 7.00 6.00   HEMOGLOBIN g/dL 10.9* 11.6* 12.0   PLATELETS 10*3/mm3 178 180 186     Results from last 7 days   Lab Units 07/15/21  0321 07/14/21  0500 07/13/21  2121 07/13/21  1254   SODIUM mmol/L 142 143  --  141 "   POTASSIUM mmol/L 4.4 3.8 4.1 5.0   CHLORIDE mmol/L 100 100  --  101   CO2 mmol/L 33.1* 34.1*  --  33.8*   BUN mg/dL 17 17  --  17   CREATININE mg/dL 0.68 0.69  --  0.87   GLUCOSE mg/dL 92 128*  --  136*   CALCIUM mg/dL 8.7 8.9  --  8.8   MAGNESIUM mg/dL  --   --  2.2  --    Estimated Creatinine Clearance: 138.8 mL/min (by C-G formula based on SCr of 0.69 mg/dL).    Imaging: I personally visualized the images of scans/x-rays performed within last 3 days.  Imaging Results (Most Recent)     Procedure Component Value Units Date/Time    XR Chest 1 View [[id number]] Collected: 07/13/21 1326     Updated: 07/13/21 1403    Narrative:      PORTABLE CHEST X-RAY     HISTORY: Shortness of breath.     TECHNIQUE: Portable chest x-ray is correlated with chest x-ray  05/26/2017.     FINDINGS: Even accounting for the apical lordotic projection, the  cardiac silhouette is larger today than before. Central pulmonary  vasculature appears engorged but no interstitial edema or focal  infiltrate is present. There is no effusion or pneumothorax.       Impression:      Cardiomegaly with central vascular engorgement but no edema.     This report was finalized on 7/13/2021 2:00 PM by Dr. Christofer Landin M.D.       CT Head Without Contrast [[id number]] Collected: 07/13/21 1346     Updated: 07/13/21 1354    Narrative:      CT SCAN OF THE HEAD WITHOUT CONTRAST     CLINICAL HISTORY: New-onset Global headache.     CT scan of the head was obtained with 3 mm axial images. No intravenous  contrast was administered.     FINDINGS:     The ventricles, sulci, and cisterns are age appropriate. The basal  ganglia and thalami are unremarkable in appearance. The posterior fossa  structures are within normal limits.     Incidental note is made of a partially empty sella.     Incidental note is made of small mucous retention cyst within the right  ethmoid air cells.       Impression:         No evidence for acute intracranial pathology.     Radiation dose  reduction techniques were utilized, including automated  exposure control and exposure modulation based on body size.     This report was finalized on 7/13/2021 1:51 PM by Dr. Manjinder Grullon M.D.             Assessment / Recommendations:  Dyspnea upon exertion  History of Covid pneumonia with acute hypoxic respiratory failure requiring intubation  Morbid obesity  Osteoarthritis  Former smoker    She has immobility given her osteoarthritis she has gained weight with this she is morbidly obese shoulder these are contributing factors however she does state is she gets a significant response to bronchodilators.  I would not expect this if only the above are contributing factors.  CT scan really does not show much in the way of post infectious/inflammatory pulmonary fibrosis from her Covid which is encouraging.    I like to start her on Symbicort 2 puffs twice daily as well as albuterol as needed to see if this helps her with some reactive airway disease.    Definitely agree with ongoing cardiac evaluation.    Will need outpatient pfts.        Colby Kennedy MD  [facility]  07/15/21  1429    Lung nodule 6mm noted on offical read, will follow up in 6 months given her smoking history with ct chest non contrast.    Colby Kennedy MD  [facility]  07/15/21  14:30 EDT

## 2021-07-15 ENCOUNTER — APPOINTMENT (OUTPATIENT)
Dept: CT IMAGING | Facility: HOSPITAL | Age: 61
End: 2021-07-15

## 2021-07-15 ENCOUNTER — APPOINTMENT (OUTPATIENT)
Dept: NUCLEAR MEDICINE | Facility: HOSPITAL | Age: 61
End: 2021-07-15

## 2021-07-15 LAB
ALBUMIN SERPL-MCNC: 3.3 G/DL (ref 3.5–5.2)
ALBUMIN/GLOB SERPL: 1 G/DL
ALP SERPL-CCNC: 84 U/L (ref 39–117)
ALT SERPL W P-5'-P-CCNC: 13 U/L (ref 1–33)
ANION GAP SERPL CALCULATED.3IONS-SCNC: 8.9 MMOL/L (ref 5–15)
AST SERPL-CCNC: 14 U/L (ref 1–32)
BASOPHILS # BLD AUTO: 0.04 10*3/MM3 (ref 0–0.2)
BASOPHILS NFR BLD AUTO: 0.6 % (ref 0–1.5)
BH CV REST NUCLEAR ISOTOPE DOSE: 11 MCI
BH CV STRESS COMMENTS STAGE 1: NORMAL
BH CV STRESS DOSE REGADENOSON STAGE 1: 0.4
BH CV STRESS DURATION MIN STAGE 1: 0
BH CV STRESS DURATION SEC STAGE 1: 10
BH CV STRESS NUCLEAR ISOTOPE DOSE: 36 MCI
BH CV STRESS PROTOCOL 1: NORMAL
BH CV STRESS RECOVERY BP: NORMAL MMHG
BH CV STRESS RECOVERY HR: 100 BPM
BH CV STRESS STAGE 1: 1
BILIRUB SERPL-MCNC: 0.3 MG/DL (ref 0–1.2)
BUN SERPL-MCNC: 17 MG/DL (ref 8–23)
BUN/CREAT SERPL: 25 (ref 7–25)
CALCIUM SPEC-SCNC: 8.7 MG/DL (ref 8.6–10.5)
CHLORIDE SERPL-SCNC: 100 MMOL/L (ref 98–107)
CHOLEST SERPL-MCNC: 204 MG/DL (ref 0–200)
CO2 SERPL-SCNC: 33.1 MMOL/L (ref 22–29)
CREAT SERPL-MCNC: 0.68 MG/DL (ref 0.57–1)
DEPRECATED RDW RBC AUTO: 44.1 FL (ref 37–54)
EOSINOPHIL # BLD AUTO: 0.16 10*3/MM3 (ref 0–0.4)
EOSINOPHIL NFR BLD AUTO: 2.6 % (ref 0.3–6.2)
ERYTHROCYTE [DISTWIDTH] IN BLOOD BY AUTOMATED COUNT: 13 % (ref 12.3–15.4)
GFR SERPL CREATININE-BSD FRML MDRD: 88 ML/MIN/1.73
GLOBULIN UR ELPH-MCNC: 3.3 GM/DL
GLUCOSE SERPL-MCNC: 92 MG/DL (ref 65–99)
HBA1C MFR BLD: 5.7 % (ref 4.8–5.6)
HCT VFR BLD AUTO: 34.9 % (ref 34–46.6)
HDLC SERPL-MCNC: 41 MG/DL (ref 40–60)
HGB BLD-MCNC: 10.9 G/DL (ref 12–15.9)
IMM GRANULOCYTES # BLD AUTO: 0.02 10*3/MM3 (ref 0–0.05)
IMM GRANULOCYTES NFR BLD AUTO: 0.3 % (ref 0–0.5)
LDLC SERPL CALC-MCNC: 144 MG/DL (ref 0–100)
LDLC/HDLC SERPL: 3.46 {RATIO}
LV EF NUC BP: 53 %
LYMPHOCYTES # BLD AUTO: 1.15 10*3/MM3 (ref 0.7–3.1)
LYMPHOCYTES NFR BLD AUTO: 18.6 % (ref 19.6–45.3)
MAXIMAL PREDICTED HEART RATE: 158 BPM
MCH RBC QN AUTO: 29.5 PG (ref 26.6–33)
MCHC RBC AUTO-ENTMCNC: 31.2 G/DL (ref 31.5–35.7)
MCV RBC AUTO: 94.6 FL (ref 79–97)
MONOCYTES # BLD AUTO: 0.5 10*3/MM3 (ref 0.1–0.9)
MONOCYTES NFR BLD AUTO: 8.1 % (ref 5–12)
NEUTROPHILS NFR BLD AUTO: 4.3 10*3/MM3 (ref 1.7–7)
NEUTROPHILS NFR BLD AUTO: 69.8 % (ref 42.7–76)
NRBC BLD AUTO-RTO: 0 /100 WBC (ref 0–0.2)
PERCENT MAX PREDICTED HR: 75.32 %
PLATELET # BLD AUTO: 178 10*3/MM3 (ref 140–450)
PMV BLD AUTO: 9.7 FL (ref 6–12)
POTASSIUM SERPL-SCNC: 4.4 MMOL/L (ref 3.5–5.2)
PROT SERPL-MCNC: 6.6 G/DL (ref 6–8.5)
QT INTERVAL: 346 MS
RBC # BLD AUTO: 3.69 10*6/MM3 (ref 3.77–5.28)
SODIUM SERPL-SCNC: 142 MMOL/L (ref 136–145)
STRESS BASELINE BP: NORMAL MMHG
STRESS BASELINE HR: 86 BPM
STRESS PERCENT HR: 89 %
STRESS POST PEAK BP: NORMAL MMHG
STRESS POST PEAK HR: 119 BPM
STRESS TARGET HR: 134 BPM
T4 FREE SERPL-MCNC: 1.02 NG/DL (ref 0.93–1.7)
TRIGL SERPL-MCNC: 106 MG/DL (ref 0–150)
TROPONIN T SERPL-MCNC: <0.01 NG/ML (ref 0–0.03)
TSH SERPL DL<=0.05 MIU/L-ACNC: 12.6 UIU/ML (ref 0.27–4.2)
VLDLC SERPL-MCNC: 19 MG/DL (ref 5–40)
WBC # BLD AUTO: 6.17 10*3/MM3 (ref 3.4–10.8)

## 2021-07-15 PROCEDURE — 93017 CV STRESS TEST TRACING ONLY: CPT

## 2021-07-15 PROCEDURE — 78452 HT MUSCLE IMAGE SPECT MULT: CPT | Performed by: INTERNAL MEDICINE

## 2021-07-15 PROCEDURE — A9500 TC99M SESTAMIBI: HCPCS | Performed by: HOSPITALIST

## 2021-07-15 PROCEDURE — 25010000002 FUROSEMIDE PER 20 MG: Performed by: HOSPITALIST

## 2021-07-15 PROCEDURE — 80053 COMPREHEN METABOLIC PANEL: CPT | Performed by: HOSPITALIST

## 2021-07-15 PROCEDURE — 94799 UNLISTED PULMONARY SVC/PX: CPT

## 2021-07-15 PROCEDURE — G0378 HOSPITAL OBSERVATION PER HR: HCPCS

## 2021-07-15 PROCEDURE — 84484 ASSAY OF TROPONIN QUANT: CPT | Performed by: INTERNAL MEDICINE

## 2021-07-15 PROCEDURE — 93018 CV STRESS TEST I&R ONLY: CPT | Performed by: INTERNAL MEDICINE

## 2021-07-15 PROCEDURE — 93016 CV STRESS TEST SUPVJ ONLY: CPT | Performed by: INTERNAL MEDICINE

## 2021-07-15 PROCEDURE — 94760 N-INVAS EAR/PLS OXIMETRY 1: CPT

## 2021-07-15 PROCEDURE — 83036 HEMOGLOBIN GLYCOSYLATED A1C: CPT | Performed by: HOSPITALIST

## 2021-07-15 PROCEDURE — 0 TECHNETIUM SESTAMIBI: Performed by: HOSPITALIST

## 2021-07-15 PROCEDURE — 85025 COMPLETE CBC W/AUTO DIFF WBC: CPT | Performed by: HOSPITALIST

## 2021-07-15 PROCEDURE — 99214 OFFICE O/P EST MOD 30 MIN: CPT | Performed by: INTERNAL MEDICINE

## 2021-07-15 PROCEDURE — 25010000002 REGADENOSON 0.4 MG/5ML SOLUTION: Performed by: HOSPITALIST

## 2021-07-15 PROCEDURE — 78452 HT MUSCLE IMAGE SPECT MULT: CPT

## 2021-07-15 PROCEDURE — 80061 LIPID PANEL: CPT | Performed by: HOSPITALIST

## 2021-07-15 PROCEDURE — 71250 CT THORAX DX C-: CPT

## 2021-07-15 PROCEDURE — 84443 ASSAY THYROID STIM HORMONE: CPT | Performed by: HOSPITALIST

## 2021-07-15 PROCEDURE — 96376 TX/PRO/DX INJ SAME DRUG ADON: CPT

## 2021-07-15 PROCEDURE — 84439 ASSAY OF FREE THYROXINE: CPT | Performed by: HOSPITALIST

## 2021-07-15 RX ORDER — LEVOTHYROXINE SODIUM 175 UG/1
175 TABLET ORAL DAILY
Status: DISCONTINUED | OUTPATIENT
Start: 2021-07-16 | End: 2021-07-16 | Stop reason: HOSPADM

## 2021-07-15 RX ORDER — LOSARTAN POTASSIUM 50 MG/1
50 TABLET ORAL
Status: DISCONTINUED | OUTPATIENT
Start: 2021-07-16 | End: 2021-07-16 | Stop reason: HOSPADM

## 2021-07-15 RX ADMIN — DABIGATRAN ETEXILATE MESYLATE 150 MG: 150 CAPSULE ORAL at 00:36

## 2021-07-15 RX ADMIN — LEVOTHYROXINE SODIUM 150 MCG: 0.15 TABLET ORAL at 12:02

## 2021-07-15 RX ADMIN — TECHNETIUM TC 99M SESTAMIBI 1 DOSE: 1 INJECTION INTRAVENOUS at 09:54

## 2021-07-15 RX ADMIN — ASPIRIN 81 MG: 81 TABLET, CHEWABLE ORAL at 12:02

## 2021-07-15 RX ADMIN — DABIGATRAN ETEXILATE MESYLATE 150 MG: 150 CAPSULE ORAL at 14:35

## 2021-07-15 RX ADMIN — CARVEDILOL 25 MG: 25 TABLET, FILM COATED ORAL at 12:02

## 2021-07-15 RX ADMIN — TECHNETIUM TC 99M SESTAMIBI 1 DOSE: 1 INJECTION INTRAVENOUS at 06:35

## 2021-07-15 RX ADMIN — IPRATROPIUM BROMIDE AND ALBUTEROL SULFATE 3 ML: 2.5; .5 SOLUTION RESPIRATORY (INHALATION) at 14:56

## 2021-07-15 RX ADMIN — SERTRALINE 100 MG: 100 TABLET, FILM COATED ORAL at 20:04

## 2021-07-15 RX ADMIN — PANTOPRAZOLE SODIUM 40 MG: 40 TABLET, DELAYED RELEASE ORAL at 12:05

## 2021-07-15 RX ADMIN — ESCITALOPRAM 10 MG: 10 TABLET, FILM COATED ORAL at 20:04

## 2021-07-15 RX ADMIN — IPRATROPIUM BROMIDE AND ALBUTEROL SULFATE 3 ML: 2.5; .5 SOLUTION RESPIRATORY (INHALATION) at 20:53

## 2021-07-15 RX ADMIN — REGADENOSON 0.4 MG: 0.08 INJECTION, SOLUTION INTRAVENOUS at 09:55

## 2021-07-15 RX ADMIN — IPRATROPIUM BROMIDE AND ALBUTEROL SULFATE 3 ML: 2.5; .5 SOLUTION RESPIRATORY (INHALATION) at 11:50

## 2021-07-15 RX ADMIN — LOSARTAN POTASSIUM 100 MG: 100 TABLET, FILM COATED ORAL at 12:03

## 2021-07-15 RX ADMIN — IPRATROPIUM BROMIDE AND ALBUTEROL SULFATE 3 ML: 2.5; .5 SOLUTION RESPIRATORY (INHALATION) at 06:56

## 2021-07-15 RX ADMIN — FUROSEMIDE 40 MG: 10 INJECTION, SOLUTION INTRAMUSCULAR; INTRAVENOUS at 06:06

## 2021-07-15 NOTE — PROGRESS NOTES
[facility]  Cardiology Progress Note    Patient Identification:  Name: Anne Gentile  Age: 61 y.o.  Sex: female  :  1960  MRN: [id number]                 Follow Up / Chief Complaint: Shortness of breath    Interval History:  Patient feels much much better     Subjective:  No chest pains or tightness in the chest    Objective:    Past Medical History:  Past Medical History:   Diagnosis Date   • Arthritis    • Atrial fibrillation (CMS/HCC)    • CHF (congestive heart failure) (CMS/HCC)    • COPD (chronic obstructive pulmonary disease) (CMS/HCC)    • Coronary artery disease    • COVID-19    • Depression    • Disease of thyroid gland    • Hypertension      Past Surgical History:  Past Surgical History:   Procedure Laterality Date   • BREAST SURGERY     • CHOLECYSTECTOMY  26+ years ago   • KNEE ARTHROSCOPY Right         Social History:   Social History     Tobacco Use   • Smoking status: Former Smoker     Packs/day: 1.50     Years: 20.00     Pack years: 30.00     Quit date: 3/23/2011     Years since quitting: 10.3   • Smokeless tobacco: Never Used   Substance Use Topics   • Alcohol use: No      Family History:  Family History   Problem Relation Age of Onset   • Cancer Mother    • COPD Father    • Heart disease Father    • Hypertension Father           Allergies:  No Known Allergies  Scheduled Meds:  aspirin, 81 mg, Daily  carvedilol, 25 mg, BID With Meals  dabigatran etexilate, 150 mg, Q12H  escitalopram, 10 mg, Nightly  furosemide, 40 mg, Q12H  ipratropium-albuterol, 3 mL, Q4H - RT  [START ON 2021] levothyroxine, 175 mcg, Daily  [START ON 2021] losartan, 50 mg, Q24H  pantoprazole, 40 mg, BID AC  sertraline, 100 mg, Nightly            INTAKE AND OUTPUT:    Intake/Output Summary (Last 24 hours) at 7/15/2021 1728  Last data filed at 7/15/2021 1324  Gross per 24 hour   Intake 360 ml   Output --   Net 360 ml       Review of Systems:   GI:  Cardiac: No chest pains or tightness in the  chest  Pulmonary:    Constitutional:  Temp:  [97.3 °F (36.3 °C)-98.4 °F (36.9 °C)] 97.9 °F (36.6 °C)  Heart Rate:  [] 103  Resp:  [16-18] 18  BP: ()/(60-83) 93/60    Physical Exam:  General:  Appears in no acute distress  Eyes: PERTL,  HEENT:  No JVD. Thyroid not visibly enlarged. No mucosal pallor or cyanosis  Respiratory: Respirations regular and unlabored at rest. BBS with good air entry in all fields. No crackles, rubs or wheezes auscultated  Cardiovascular: S1S2 Regular rate and rhythm. No murmur, rub or gallop. No carotid bruits. DP/PT pulses     . No pretibial pitting edema  Gastrointestinal: Abdomen soft, flat, non tender. Bowel sounds present. No hepatosplenomegaly. No ascites  Musculoskeletal: BAUER x4. No abnormal movements  Extremities: No digital clubbing or cyanosis  Skin: Color pink. Skin warm and dry to touch. No rashes    Neuro: AAO x3 CN II-XII grossly intact  Psych: Mood and affect normal, pleasant and cooperative          Cardiographics  Telemetry:     ECG:     Echocardiogram:     Lab Review   Results from last 7 days   Lab Units 07/15/21  0321 07/13/21  1254   TROPONIN T ng/mL <0.010 <0.010     Results from last 7 days   Lab Units 07/13/21  2121   MAGNESIUM mg/dL 2.2     Results from last 7 days   Lab Units 07/15/21  0321   SODIUM mmol/L 142   POTASSIUM mmol/L 4.4   BUN mg/dL 17   CREATININE mg/dL 0.68   CALCIUM mg/dL 8.7     @[id number]@  Results from last 7 days   Lab Units 07/15/21  0321 07/14/21  0500 07/13/21  1254   WBC 10*3/mm3 6.17 7.00 6.00   HEMOGLOBIN g/dL 10.9* 11.6* 12.0   HEMATOCRIT % 34.9 37.6 37.4   PLATELETS 10*3/mm3 178 180 186             Assessment:  Assessment:  Shortness of breath  Respiratory insufficiency  Obesity  Hypertension      Plan:  Stress test is positive with ischemia small anterior wall as well as small inferior wall at this point considering significant other morbid conditions will be treated medically    Diagnostic heart catheterization will be  "considered once the patient is medically stable as an outpatient    We will continue aspirin beta-blockers as well as ARB    )7/15/2021  Filemon Kumari MD      EMR Dragon/Transcription:   \"Dictated utilizing Dragon dictation\".     "

## 2021-07-15 NOTE — PROGRESS NOTES
"Daily progress note    Chief complaint   Doing little better  Still short of breath  Denies any chest pain palpitation  Family at bedside    History of present illness  61-year-old white female with history of chronic hypoxic respiratory failure on home oxygen also has COPD congestive heart failure coronary artery disease morbidly obese hypothyroidism who also has had COVID-19 recently and was on ventilator and was discharged after stabilization presented to [facility] emergency room with shortness of breath for last several days to weeks.  Patient also have leg swelling.  Patient denies any fever chills but does have headache.  Patient denies any chest pain but has palpitation off and on.  Patient evaluated in ER found to be in acute on chronic hypoxic story failure admit for management.      REVIEW OF SYSTEMS  All systems reviewed and negative except for those discussed in HPI.      PHYSICAL EXAM   Blood pressure 103/75, pulse 97, temperature 97.9 °F (36.6 °C), temperature source Oral, resp. rate 18, height 170.2 cm (67\"), weight (!) 168 kg (370 lb), SpO2 (!) 86 %.    GENERAL: Alert, oriented, chronically ill-appearing, not distressed  HENT: head atraumatic, no nuchal rigidity  EYES: no scleral icterus, EOMI  CV: regular rhythm, regular rate, no murmur  RESPIRATORY: normal effort, CTA  ABDOMEN: soft, nontender bowel sounds positive  MUSCULOSKELETAL: no deformity, FROM, 1+ pedal edema bilaterally  NEURO: alert, moves all extremities, follows commands  SKIN: warm, dry     LAB RESULTS  Lab Results (last 24 hours)     Procedure Component Value Units Date/Time    Comprehensive Metabolic Panel [[id number]]  (Abnormal) Collected: 07/15/21 0321    Specimen: Blood Updated: 07/15/21 0617     Glucose 92 mg/dL      BUN 17 mg/dL      Creatinine 0.68 mg/dL      Sodium 142 mmol/L      Potassium 4.4 mmol/L      Comment: Slight hemolysis detected by analyzer. Results may be affected.        Chloride 100 mmol/L      CO2 " 33.1 mmol/L      Calcium 8.7 mg/dL      Total Protein 6.6 g/dL      Albumin 3.30 g/dL      ALT (SGPT) 13 U/L      AST (SGOT) 14 U/L      Alkaline Phosphatase 84 U/L      Total Bilirubin 0.3 mg/dL      eGFR Non African Amer 88 mL/min/1.73      Globulin 3.3 gm/dL      A/G Ratio 1.0 g/dL      BUN/Creatinine Ratio 25.0     Anion Gap 8.9 mmol/L     Narrative:      GFR Normal >60  Chronic Kidney Disease <60  Kidney Failure <15      TSH [[id number]]  (Abnormal) Collected: 07/15/21 0321    Specimen: Blood Updated: 07/15/21 0617     TSH 12.600 uIU/mL     Troponin [[id number]]  (Normal) Collected: 07/15/21 0321    Specimen: Blood Updated: 07/15/21 0616     Troponin T <0.010 ng/mL     Narrative:      Troponin T Reference Range:  <= 0.03 ng/mL-   Negative for AMI  >0.03 ng/mL-     Abnormal for myocardial necrosis.  Clinicians would have to utilize clinical acumen, EKG, Troponin and serial changes to determine if it is an Acute Myocardial Infarction or myocardial injury due to an underlying chronic condition.       Results may be falsely decreased if patient taking Biotin.      Lipid Panel [[id number]]  (Abnormal) Collected: 07/15/21 0321    Specimen: Blood Updated: 07/15/21 0613     Total Cholesterol 204 mg/dL      Triglycerides 106 mg/dL      HDL Cholesterol 41 mg/dL      LDL Cholesterol  144 mg/dL      VLDL Cholesterol 19 mg/dL      LDL/HDL Ratio 3.46    Narrative:      Cholesterol Reference Ranges  (U.S. Department of Health and Human Services ATP III Classifications)    Desirable          <200 mg/dL  Borderline High    200-239 mg/dL  High Risk          >240 mg/dL      Triglyceride Reference Ranges  (U.S. Department of Health and Human Services ATP III Classifications)    Normal           <150 mg/dL  Borderline High  150-199 mg/dL  High             200-499 mg/dL  Very High        >500 mg/dL    HDL Reference Ranges  (U.S. Department of Health and Human Services ATP III Classifcations)    Low     <40 mg/dl (major risk factor for  CHD)  High    >60 mg/dl ('negative' risk factor for CHD)        LDL Reference Ranges  (U.S. Department of Health and Human Services ATP III Classifcations)    Optimal          <100 mg/dL  Near Optimal     100-129 mg/dL  Borderline High  130-159 mg/dL  High             160-189 mg/dL  Very High        >189 mg/dL    Hemoglobin A1c [[id number]]  (Abnormal) Collected: 07/15/21 0321    Specimen: Blood Updated: 07/15/21 0552     Hemoglobin A1C 5.70 %     Narrative:      Hemoglobin A1C Ranges:    Increased Risk for Diabetes  5.7% to 6.4%  Diabetes                     >= 6.5%  Diabetic Goal                < 7.0%    CBC & Differential [[id number]]  (Abnormal) Collected: 07/15/21 0321    Specimen: Blood Updated: 07/15/21 0542    Narrative:      The following orders were created for panel order CBC & Differential.  Procedure                               Abnormality         Status                     ---------                               -----------         ------                     CBC Auto Differential[[id number]]        Abnormal            Final result                 Please view results for these tests on the individual orders.    CBC Auto Differential [[id number]]  (Abnormal) Collected: 07/15/21 0321    Specimen: Blood Updated: 07/15/21 0542     WBC 6.17 10*3/mm3      RBC 3.69 10*6/mm3      Hemoglobin 10.9 g/dL      Hematocrit 34.9 %      MCV 94.6 fL      MCH 29.5 pg      MCHC 31.2 g/dL      RDW 13.0 %      RDW-SD 44.1 fl      MPV 9.7 fL      Platelets 178 10*3/mm3      Neutrophil % 69.8 %      Lymphocyte % 18.6 %      Monocyte % 8.1 %      Eosinophil % 2.6 %      Basophil % 0.6 %      Immature Grans % 0.3 %      Neutrophils, Absolute 4.30 10*3/mm3      Lymphocytes, Absolute 1.15 10*3/mm3      Monocytes, Absolute 0.50 10*3/mm3      Eosinophils, Absolute 0.16 10*3/mm3      Basophils, Absolute 0.04 10*3/mm3      Immature Grans, Absolute 0.02 10*3/mm3      nRBC 0.0 /100 WBC            Study Result    Narrative & Impression    PORTABLE CHEST X-RAY     HISTORY: Shortness of breath.     TECHNIQUE: Portable chest x-ray is correlated with chest x-ray  05/26/2017.     FINDINGS: Even accounting for the apical lordotic projection, the  cardiac silhouette is larger today than before. Central pulmonary  vasculature appears engorged but no interstitial edema or focal  infiltrate is present. There is no effusion or pneumothorax.     IMPRESSION:  Cardiomegaly with central vascular engorgement but no edema.     This report was finalized on 7/13/2021 2:00 PM by Dr. Christofer Landin M.D.           ECG 12 Lead  Component   Ref Range & Units 7/14/21 0859 7/13/21 2051   QT Interval   ms 372 P  396 P           HEART RATE= 89  bpm  RR Interval= 674  ms  IL Interval=   ms  P Horizontal Axis=   deg  P Front Axis=   deg  QRSD Interval= 112  ms  QT Interval= 372  ms  QRS Axis= -44  deg  T Wave Axis= 20  deg  - ABNORMAL ECG -  Atrial fibrillation  Borderline IVCD with LAD  Borderline T abnormalities, anterior leads  Electronically Signed By:            2D echo showed ejection fraction 59%    Current Facility-Administered Medications:   •  aspirin chewable tablet 81 mg, 81 mg, Oral, Daily, Flip Nolan MD, 81 mg at 07/15/21 1202  •  carvedilol (COREG) tablet 25 mg, 25 mg, Oral, BID With Meals, Flip Nolan MD, 25 mg at 07/15/21 1202  •  dabigatran etexilate (PRADAXA) capsule 150 mg, 150 mg, Oral, Q12H, Flip Nolan MD, 150 mg at 07/15/21 0036  •  escitalopram (LEXAPRO) tablet 10 mg, 10 mg, Oral, Nightly, lFip Nolan MD, 10 mg at 07/14/21 2003  •  furosemide (LASIX) injection 40 mg, 40 mg, Intravenous, Q12H, Flip Nolan MD, 40 mg at 07/15/21 0606  •  ipratropium-albuterol (DUO-NEB) nebulizer solution 3 mL, 3 mL, Nebulization, Q4H - RT, Flip Nolan MD, 3 mL at 07/15/21 1150  •  levothyroxine (SYNTHROID, LEVOTHROID) tablet 150 mcg, 150 mcg, Oral, Daily, Flip Nolan MD, 150 mcg at 07/15/21 1202  •  losartan (COZAAR) tablet 100 mg, 100 mg, Oral, Q24H,  Lisa Nolan MD, 100 mg at 07/15/21 1203  •  pantoprazole (PROTONIX) EC tablet 40 mg, 40 mg, Oral, BID AC, Lisa Nolan MD, 40 mg at 07/15/21 1205  •  sertraline (ZOLOFT) tablet 100 mg, 100 mg, Oral, Nightly, Lisa Nolan MD, 100 mg at 07/14/21 2003  •  [COMPLETED] Insert peripheral IV, , , Once **AND** sodium chloride 0.9 % flush 10 mL, 10 mL, Intravenous, PRN, Frantz Johnson PA     ASSESSMENT  Acute on chronic hypoxic respiratory failure  Acute on chronic diastolic congestive heart failure  Paroxysmal atrial fibrillation on Pradaxa  Hypertension  Hypothyroidism with elevated TSH  Obesity obese  Depression  Gastroesophageal reflux disease    PLAN  CPM  Supplement oxygen  Nebulizer  IV diuresis  Check stress Cardiolite  Adjust Synthroid dose and check free T4  Cardiology consult appreciated   Pulmonary consult pending  Adjust home medications  Stress ulcer DVT prophylaxis  Supportive care  Discussed with family and nursing staff  Follow closely further recommendation current hospital course    LISA NOLAN MD

## 2021-07-15 NOTE — SIGNIFICANT NOTE
07/15/21 0924   OTHER   Discipline physical therapist   Rehab Time/Intention   Session Not Performed other (see comments)  (spoke with RN who states that pt is up ad adrianna in the room. spoke with pt and she feels like she is at her baseline. acute PT not needed at this time, will sign off.)

## 2021-07-15 NOTE — PAYOR COMM NOTE
"Anne Zapien (61 y.o. Female)     INITIAL CLINICAL FOR [facility] FOR [id number]  PATIENT WAS OBS KRYSTA CONVERTED TO IP.    CONTACT ANNE STEINFREDERICK  P# [phone]  F# [phone]      Date of Birth Social Security Number Address Home Phone MRN    1960  [address] [place] [address] [phone] [id number]    Mormon Marital Status          Unknown Single       Admission Date Admission Type Admitting Provider Attending Provider Department, Room/Bed    7/13/21 Emergency Flip Nolan MD Ahmed, Aftab, MD [address], [address]    Discharge Date Discharge Disposition Discharge Destination                       Attending Provider: Flip Nolan MD    Allergies: No Known Allergies    Isolation: None   Infection: None   Code Status: CPR    Ht: 170.2 cm (67\")   Wt: 168 kg (370 lb)    Admission Cmt: None   Principal Problem: None                Active Insurance as of 7/13/2021     Primary Coverage     Payor Plan Insurance Group Employer/Plan Group    [facility] [employer]UE CROSS [facility] EMPLOYEE [id number]     Payor Plan Address Payor Plan Phone Number Payor Plan Fax Number Effective Dates    [address] [phone]  1/1/2018 - None Entered    [place] [address]       Subscriber Name Subscriber Birth Date Member ID       ANNE ZAPIEN 1960 [id number]                 Emergency Contacts      (Rel.) Home Phone Work Phone Mobile Phone    Malcom Salas (Partner) [phone] -- [phone]               History & Physical      Flip Nolan MD at 07/14/21 1217          History and physical    Primary care physician  Dr. AMOR    Chief complaint  Shortness of breath    History of present illness  61-year-old white female with history of chronic hypoxic respiratory failure on home oxygen also has COPD congestive heart failure coronary artery disease morbidly obese hypothyroidism who also has had COVID-19 recently and was on ventilator and was discharged " "after stabilization presented to [facility] emergency room with shortness of breath for last several days to weeks.  Patient also have leg swelling.  Patient denies any fever chills but does have headache.  Patient denies any chest pain but has palpitation off and on.  Patient evaluated in ER found to be in acute on chronic hypoxic story failure admit for management.     PAST MEDICAL HISTORY  • Chronic hypoxic respiratory failure     • CHF      • COPD      • Coronary artery disease     • Recent COVID-19     • Hypothyroidism  Morbidly obese        PAST SURGICAL HISTORY              Procedure Laterality Date   • BREAST SURGERY       • CHOLECYSTECTOMY   26+ years ago   • KNEE ARTHROSCOPY Right 2013         FAMILY HISTORY           Problem Relation Age of Onset   • Cancer Mother     • COPD Father     • Heart disease Father     • Hypertension Father        SOCIAL HISTORY                 Socioeconomic History   • Marital status: Single       Spouse name: Not on file   • Number of children: Not on file   • Years of education: Not on file   • Highest education level: Not on file   Tobacco Use   • Smoking status: Former Smoker       Packs/day: 1.50       Years: 20.00       Pack years: 30.00       Quit date: 3/23/2011       Years since quitting: 10.3   • Smokeless tobacco: Never Used   Vaping Use   • Vaping Use: Never used   Substance and Sexual Activity   • Alcohol use: No   • Drug use: No   • Sexual activity: Defer       Birth control/protection: Post-menopausal         ALLERGIES  Patient has no known allergies.  Home medications reviewed     REVIEW OF SYSTEMS  All systems reviewed and negative except for those discussed in HPI.      PHYSICAL EXAM   Blood pressure 131/85, pulse 112, temperature 98.7 °F (37.1 °C), temperature source Oral, resp. rate 18, height 170.2 cm (67\"), weight (!) 168 kg (370 lb), SpO2 93 %.    GENERAL: Alert, oriented, chronically ill-appearing, not distressed  HENT: head atraumatic, no " nuchal rigidity  EYES: no scleral icterus, EOMI  CV: regular rhythm, regular rate, no murmur  RESPIRATORY: normal effort, CTA  ABDOMEN: soft, nontender bowel sounds positive  MUSCULOSKELETAL: no deformity, FROM, 1+ pedal edema bilaterally  NEURO: alert, moves all extremities, follows commands  SKIN: warm, dry     LAB RESULTS  Lab Results (last 24 hours)     Procedure Component Value Units Date/Time    Comprehensive Metabolic Panel [[id number]]  (Abnormal) Collected: 07/14/21 0500    Specimen: Blood Updated: 07/14/21 0650     Glucose 128 mg/dL      BUN 17 mg/dL      Creatinine 0.69 mg/dL      Sodium 143 mmol/L      Potassium 3.8 mmol/L      Chloride 100 mmol/L      CO2 34.1 mmol/L      Calcium 8.9 mg/dL      Total Protein 6.9 g/dL      Albumin 3.60 g/dL      ALT (SGPT) 12 U/L      AST (SGOT) 8 U/L      Alkaline Phosphatase 87 U/L      Total Bilirubin 0.2 mg/dL      eGFR Non African Amer 86 mL/min/1.73      Globulin 3.3 gm/dL      A/G Ratio 1.1 g/dL      BUN/Creatinine Ratio 24.6     Anion Gap 8.9 mmol/L     Narrative:      GFR Normal >60  Chronic Kidney Disease <60  Kidney Failure <15      CBC (No Diff) [[id number]]  (Abnormal) Collected: 07/14/21 0500    Specimen: Blood Updated: 07/14/21 0637     WBC 7.00 10*3/mm3      RBC 3.82 10*6/mm3      Hemoglobin 11.6 g/dL      Hematocrit 37.6 %      MCV 98.4 fL      MCH 30.4 pg      MCHC 30.9 g/dL      RDW 13.2 %      RDW-SD 48.3 fl      MPV 9.6 fL      Platelets 180 10*3/mm3     COVID PRE-OP / PRE-PROCEDURE SCREENING ORDER (NO ISOLATION) - Swab, Nasopharynx [[id number]]  (Normal) Collected: 07/13/21 1657    Specimen: Swab from Nasopharynx Updated: 07/13/21 2209    Narrative:      The following orders were created for panel order COVID PRE-OP / PRE-PROCEDURE SCREENING ORDER (NO ISOLATION) - Swab, Nasopharynx.  Procedure                               Abnormality         Status                     ---------                               -----------         ------                      COVID-19,APTIMA PANTHER,...[[id number]]  Normal              Final result                 Please view results for these tests on the individual orders.    COVID-19,APTIMA PANTHER,[facility] IN-HOUSE, NP/OP SWAB IN UTM/VTM/SALINE TRANSPORT MEDIA,24 HR TAT - Swab, Nasopharynx [[id number]]  (Normal) Collected: 07/13/21 1657    Specimen: Swab from Nasopharynx Updated: 07/13/21 2209     COVID19 Not Detected    Narrative:      Fact sheet for providers: https://www.fda.gov/media/750836/download     Fact sheet for patients: https://www.fda.gov/media/884476/download    Test performed by RT PCR.    Potassium [[id number]]  (Normal) Collected: 07/13/21 2121    Specimen: Blood Updated: 07/13/21 2207     Potassium 4.1 mmol/L     Magnesium [[id number]]  (Normal) Collected: 07/13/21 2121    Specimen: Blood Updated: 07/13/21 2203     Magnesium 2.2 mg/dL            Study Result    Narrative & Impression   PORTABLE CHEST X-RAY     HISTORY: Shortness of breath.     TECHNIQUE: Portable chest x-ray is correlated with chest x-ray  05/26/2017.     FINDINGS: Even accounting for the apical lordotic projection, the  cardiac silhouette is larger today than before. Central pulmonary  vasculature appears engorged but no interstitial edema or focal  infiltrate is present. There is no effusion or pneumothorax.     IMPRESSION:  Cardiomegaly with central vascular engorgement but no edema.     This report was finalized on 7/13/2021 2:00 PM by Dr. Christofer Landin M.D.           ECG 12 Lead  Component   Ref Range & Units 7/14/21 0859 7/13/21 2051   QT Interval   ms 372 P  396 P           HEART RATE= 89  bpm  RR Interval= 674  ms  OR Interval=   ms  P Horizontal Axis=   deg  P Front Axis=   deg  QRSD Interval= 112  ms  QT Interval= 372  ms  QRS Axis= -44  deg  T Wave Axis= 20  deg  - ABNORMAL ECG -  Atrial fibrillation  Borderline IVCD with LAD  Borderline T abnormalities, anterior leads  Electronically Signed By:              Current Facility-Administered  Medications:   •  aspirin chewable tablet 81 mg, 81 mg, Oral, Daily, Lisa Nolan MD  •  carvedilol (COREG) tablet 25 mg, 25 mg, Oral, BID With Meals, Lisa Nolan MD  •  dabigatran etexilate (PRADAXA) capsule 150 mg, 150 mg, Oral, Q12H, Lisa Nolan MD  •  escitalopram (LEXAPRO) tablet 10 mg, 10 mg, Oral, Nightly, Lisa Nolan MD  •  furosemide (LASIX) tablet 20 mg, 20 mg, Oral, BID, Lisa Nolan MD  •  ipratropium-albuterol (DUO-NEB) nebulizer solution 3 mL, 3 mL, Nebulization, Q4H - RT, Lisa Nolan MD, 3 mL at 07/14/21 1024  •  levothyroxine (SYNTHROID, LEVOTHROID) tablet 150 mcg, 150 mcg, Oral, Daily, Lisa Nolan MD  •  losartan (COZAAR) tablet 100 mg, 100 mg, Oral, Q24H, Lisa Nolan MD  •  [START ON 7/15/2021] pantoprazole (PROTONIX) EC tablet 40 mg, 40 mg, Oral, QAM, Lisa Nolan MD  •  sertraline (ZOLOFT) tablet 100 mg, 100 mg, Oral, Nightly, Lisa Nolan MD  •  [COMPLETED] Insert peripheral IV, , , Once **AND** sodium chloride 0.9 % flush 10 mL, 10 mL, Intravenous, PRN, Frantz Johnson PA     ASSESSMENT  Acute on chronic hypoxic respiratory failure  Acute on chronic diastolic congestive heart failure  Paroxysmal atrial fibrillation on Pradaxa  Hypertension  Hypothyroidism  Obesity obese  Depression  Gastroesophageal reflux disease    PLAN  Admit  Supplement oxygen  Nebulizer  IV diuresis  Serial cardiac enzymes EKG  Check 2D echo  Cardiology and pulmonary consult  Adjust home medications  Stress ulcer DVT prophylaxis  Supportive care  Patient is full code  Discussed with family and nursing staff  Follow closely further recommendation current hospital course    LISA NOLAN MD        Electronically signed by Lisa Nolan MD at 07/14/21 1224          Emergency Department Notes      Manda Garza RN at 07/13/21 1207        Patient from home with c/o shortness of breath. Patient states she was on a ventilator in January and has had progressively worsened shortness of breath since. Patient has also  had headaches and blurred vision that started Friday.      Manda Garza RN  07/13/21 1208      Electronically signed by Manda Garza RN at 07/13/21 1208     Manda Garza RN at 07/13/21 1211        Patient states she wears oxygen at home, but did not wear any home oxygen to this facility.      Manda Garza RN  07/13/21 1211      Electronically signed by Manda Garza RN at 07/13/21 1211     Frantz Johnson PA at 07/13/21 1241     Attestation signed by Aniket Gonzalez MD at 07/13/21 2110          For this patient encounter, I reviewed the NP or PA documentation, treatment plan, and medical decision making. Aniket Gonzalez MD 7/13/2021 21:10 EDT                   EMERGENCY DEPARTMENT ENCOUNTER    Room Number:  06/06  Date of encounter:  7/13/2021  PCP: Radha Stewart MD  Historian: Patient, family      I used full protective equipment while examining this patient.  This includes face mask, gloves and protective eyewear.  I washed my hands before entering the room and immediately upon leaving the room      HPI:  Chief Complaint: Shortness of breath, headache  A complete HPI/ROS/PMH/PSH/SH/FH are unobtainable due to: Nothing    Context: Anne Gentile is a 61 y.o. female who presents to the ED c/o several month history of chronic shortness of breath.  Patient had an admission to [facility] in January for Covid pneumonia.  Patient was intubated and placed in the ICU.  Since that time patient has been on chronic nasal oxygen.  She states since then she has had terrible exertional dyspnea.  She denies any orthopnea, chest pain, fever.  She does complain of pedal edema.  She has not seen a pulmonologist since being discharged from the hospital.  Patient states she feels fine at rest.    In addition patient complains of a several day history of frontal headache.  Patient describes a headache as throbbing, moderate, severe.  There are no precipitating or alleviating factors.  Patient complains of mild blurry  vision however denies any nausea, vomiting, facial droop, numbness or tingling.    Review of Medical Records  I reviewed admission from January 2021.  Patient was intubated.  Patient did have a echocardiogram which showed an EF of 42%.    PAST MEDICAL HISTORY  Active Ambulatory Problems     Diagnosis Date Noted   • Malignant hypertension 08/31/2014   • Acute rhinosinusitis 02/18/2016   • Knee pain 02/18/2016   • Chest pain at rest 02/18/2016   • Chest pain 08/30/2014   • Chronic headache 02/18/2016   • Congestion of respiratory tract 02/18/2016   • Depression 08/31/2014   • Gastroesophageal reflux disease without esophagitis 09/26/2014   • Hypertension 09/26/2014   • Hypothyroidism 08/31/2014   • Headache 08/31/2014   • Morbid obesity (CMS/[facility]) 08/31/2014   • Other specified disorders of nose and paranasal sinuses 02/18/2016   • Difficulty sleeping 02/18/2016   • Unstable angina pectoris (CMS/[facility]) 08/31/2014   • Atrial fibrillation (CMS/H[facility]) 02/18/2016   • A-fib (CMS/[facility]) 01/23/2017   • H/O amiodarone therapy 01/30/2017   • Anticoagulated 01/30/2017     Resolved Ambulatory Problems     Diagnosis Date Noted   • No Resolved Ambulatory Problems     Past Medical History:   Diagnosis Date   • Arthritis    • CHF (congestive heart failure) (CMS/[facility])    • COPD (chronic obstructive pulmonary disease) (CMS/[facility])    • Coronary artery disease    • COVID-19    • Disease of thyroid gland          PAST SURGICAL HISTORY  Past Surgical History:   Procedure Laterality Date   • BREAST SURGERY     • CHOLECYSTECTOMY  26+ years ago   • KNEE ARTHROSCOPY Right 2013         FAMILY HISTORY  Family History   Problem Relation Age of Onset   • Cancer Mother    • COPD Father    • Heart disease Father    • Hypertension Father          SOCIAL HISTORY  Social History     Socioeconomic History   • Marital status: Single     Spouse name: Not on file   • Number of children: Not on file   • Years of education: Not on file   • Highest education level:  Not on file   Tobacco Use   • Smoking status: Former Smoker     Packs/day: 1.50     Years: 20.00     Pack years: 30.00     Quit date: 3/23/2011     Years since quitting: 10.3   • Smokeless tobacco: Never Used   Vaping Use   • Vaping Use: Never used   Substance and Sexual Activity   • Alcohol use: No   • Drug use: No   • Sexual activity: Defer     Birth control/protection: Post-menopausal         ALLERGIES  Patient has no known allergies.        REVIEW OF SYSTEMS  All systems reviewed and negative except for those discussed in HPI.       PHYSICAL EXAM    I have reviewed the triage vital signs and nursing notes.    ED Triage Vitals   Temp Heart Rate Resp BP SpO2   07/13/21 1209 07/13/21 1209 07/13/21 1209 07/13/21 1224 07/13/21 1209   97.5 °F (36.4 °C) 102 18 119/79 (!) 88 %      Temp src Heart Rate Source Patient Position BP Location FiO2 (%)   -- -- -- -- --              Physical Exam  GENERAL: Alert, oriented, chronically ill-appearing, not distressed  HENT: head atraumatic, no nuchal rigidity  EYES: no scleral icterus, EOMI  CV: regular rhythm, regular rate, no murmur  RESPIRATORY: normal effort, CTA  ABDOMEN: soft, nontender  MUSCULOSKELETAL: no deformity, FROM, 1+ pedal edema bilaterally  NEURO: alert, moves all extremities, follows commands  SKIN: warm, dry        LAB RESULTS  Recent Results (from the past 24 hour(s))   ECG 12 Lead    Collection Time: 07/13/21 12:32 PM   Result Value Ref Range    QT Interval 376 ms   Comprehensive Metabolic Panel    Collection Time: 07/13/21 12:54 PM    Specimen: Blood   Result Value Ref Range    Glucose 136 (H) 65 - 99 mg/dL    BUN 17 8 - 23 mg/dL    Creatinine 0.87 0.57 - 1.00 mg/dL    Sodium 141 136 - 145 mmol/L    Potassium 5.0 3.5 - 5.2 mmol/L    Chloride 101 98 - 107 mmol/L    CO2 33.8 (H) 22.0 - 29.0 mmol/L    Calcium 8.8 8.6 - 10.5 mg/dL    Total Protein 7.2 6.0 - 8.5 g/dL    Albumin 3.60 3.50 - 5.20 g/dL    ALT (SGPT) 13 1 - 33 U/L    AST (SGOT) 28 1 - 32 U/L     Alkaline Phosphatase 97 39 - 117 U/L    Total Bilirubin 0.3 0.0 - 1.2 mg/dL    eGFR Non African Amer 66 >60 mL/min/1.73    Globulin 3.6 gm/dL    A/G Ratio 1.0 g/dL    BUN/Creatinine Ratio 19.5 7.0 - 25.0    Anion Gap 6.2 5.0 - 15.0 mmol/L   BNP    Collection Time: 07/13/21 12:54 PM    Specimen: Blood   Result Value Ref Range    proBNP 512.9 0.0 - 900.0 pg/mL   Troponin    Collection Time: 07/13/21 12:54 PM    Specimen: Blood   Result Value Ref Range    Troponin T <0.010 0.000 - 0.030 ng/mL   CBC Auto Differential    Collection Time: 07/13/21 12:54 PM    Specimen: Blood   Result Value Ref Range    WBC 6.00 3.40 - 10.80 10*3/mm3    RBC 3.99 3.77 - 5.28 10*6/mm3    Hemoglobin 12.0 12.0 - 15.9 g/dL    Hematocrit 37.4 34.0 - 46.6 %    MCV 93.7 79.0 - 97.0 fL    MCH 30.1 26.6 - 33.0 pg    MCHC 32.1 31.5 - 35.7 g/dL    RDW 13.3 12.3 - 15.4 %    RDW-SD 45.5 37.0 - 54.0 fl    MPV 9.7 6.0 - 12.0 fL    Platelets 186 140 - 450 10*3/mm3    Neutrophil % 66.8 42.7 - 76.0 %    Lymphocyte % 23.5 19.6 - 45.3 %    Monocyte % 6.2 5.0 - 12.0 %    Eosinophil % 2.3 0.3 - 6.2 %    Basophil % 0.7 0.0 - 1.5 %    Immature Grans % 0.5 0.0 - 0.5 %    Neutrophils, Absolute 4.01 1.70 - 7.00 10*3/mm3    Lymphocytes, Absolute 1.41 0.70 - 3.10 10*3/mm3    Monocytes, Absolute 0.37 0.10 - 0.90 10*3/mm3    Eosinophils, Absolute 0.14 0.00 - 0.40 10*3/mm3    Basophils, Absolute 0.04 0.00 - 0.20 10*3/mm3    Immature Grans, Absolute 0.03 0.00 - 0.05 10*3/mm3    nRBC 0.2 0.0 - 0.2 /100 WBC   D-dimer, Quantitative    Collection Time: 07/13/21  2:21 PM    Specimen: Blood   Result Value Ref Range    D-Dimer, Quantitative 0.40 0.00 - 0.49 MCGFEU/mL       Ordered the above labs and independently reviewed the results.        RADIOLOGY  CT Head Without Contrast    Result Date: 7/13/2021  CT SCAN OF THE HEAD WITHOUT CONTRAST  CLINICAL HISTORY: New-onset Global headache.  CT scan of the head was obtained with 3 mm axial images. No intravenous contrast was  administered.  FINDINGS:  The ventricles, sulci, and cisterns are age appropriate. The basal ganglia and thalami are unremarkable in appearance. The posterior fossa structures are within normal limits.  Incidental note is made of a partially empty sella.  Incidental note is made of small mucous retention cyst within the right ethmoid air cells.       No evidence for acute intracranial pathology.  Radiation dose reduction techniques were utilized, including automated exposure control and exposure modulation based on body size.  This report was finalized on 7/13/2021 1:51 PM by Dr. Manjinder Grullon M.D.      XR Chest 1 View    Result Date: 7/13/2021  PORTABLE CHEST X-RAY  HISTORY: Shortness of breath.  TECHNIQUE: Portable chest x-ray is correlated with chest x-ray 05/26/2017.  FINDINGS: Even accounting for the apical lordotic projection, the cardiac silhouette is larger today than before. Central pulmonary vasculature appears engorged but no interstitial edema or focal infiltrate is present. There is no effusion or pneumothorax.      Cardiomegaly with central vascular engorgement but no edema.  This report was finalized on 7/13/2021 2:00 PM by Dr. Christofer Landin M.D.        I ordered the above noted radiological studies. Reviewed by me and discussed with radiologist.  See dictation for official radiology interpretation.      MEDICATIONS GIVEN IN ER    Medications   sodium chloride 0.9 % flush 10 mL (has no administration in time range)         PROGRESS, DATA ANALYSIS, CONSULTS, AND MEDICAL DECISION MAKING    All labs have been independently reviewed by me.  All radiology studies have been reviewed by me and discussed with radiologist dictating the report.   EKG's independently viewed and interpreted by me.  Discussion below represents my analysis of pertinent findings related to patient's condition, differential diagnosis, treatment plan and final disposition.    I have discussed case with Dr. Gonzalez, emergency room  physician.  He has performed his own bedside examination and agrees with treatment plan.    ED Course as of Jul 13 1647   Tue Jul 13, 2021   1240 Patient presents with several month history of exertional dyspnea, as well as new onset headache.  Differential diagnoses include but not limited to CHF exacerbation, COPD, pneumonia, PE.    [EE]   1300 Chest x-ray interpreted by myself shows severe cardiomegaly.  No obvious infiltrate or effusion.  I will await final radiologist interpretation.    [EE]   1334 WBC: 6.00 [EE]   1335 Hemoglobin: 12.0 [EE]   1335 proBNP: 512.9 [EE]   1335 Troponin T: <0.010 [EE]   1335 Creatinine: 0.87 [EE]   1336 I discussed CT findings of the head with Dr. Grullon.  Patient has no acute intracranial abnormalities.    [EE]   1600 No clear etiology to patient's dyspnea.  She does have a decreased EF.  I believe she needs to follow-up with pulmonary and cardiology sooner rather than later.  Plan for admission.    [EE]   1637 I discussed case with Dr. Nolan.  He agrees to admit the patient.    [EE]      ED Course User Index  [EE] Frantz Johnson PA       AS OF 16:47 EDT VITALS:    BP - 121/88  HR - 92  TEMP - 97.5 °F (36.4 °C)  O2 SATS - 95%        DIAGNOSIS  Final diagnoses:   Dyspnea on exertion         DISPOSITION  Admitted           Frantz Johnson PA  07/13/21 1648      Electronically signed by Aniket Gonzalez MD at 07/13/21 2110     Safia Higgins, RN at 07/13/21 1243        Pt reports she had COVID/pneumonia in Jan, and was at [facility] on vent at that time. PT reports SOA since, and unsure of when the SOA got worse at this time. PT denies CP at this time, no cough/fever. Pt reports she wears oxygen at night, but denies use during the day. Pt reports intermitting headache since last week, with vision changes.     Patient was placed in face mask during first look triage.  Patient was wearing a face mask throughout encounter.  I wore personal protective equipment throughout the encounter.   Hand hygiene was performed before and after patient encounter.                Safia Higgins RN  07/13/21 1246      Electronically signed by Safia Higgins RN at 07/13/21 1246     Aniket Gonzalez MD at 07/13/21 1528        I supervised care provided by the midlevel provider.   We have discussed this patient's history, physical exam, and treatment plan.  I have reviewed the note and personally saw and examined the patient and agree with the plan of care.   I have seen and evaluated this patient.  She presents with persistent shortness of breath with exertion.  This is been going on since she was diagnosed with Covid at the end of last year in the beginning of this year.  She was at [facility] and she was intubated.  Since discharge from the hospital she has been on 3 to 5 L of oxygen.  He has had persistent shortness of breath with exertion.  Her symptoms resolve when she is still and she is at rest.  Denies any chest pain.  Denies any fevers or chills.     GENERAL: not distressed at this time.  Her O2 sat is 98% on 2 L.  She is sitting up in bed.  She appears older than her stated age and chronically ill.  HENT: nares patent  Head/neck/ face are symmetric without gross deformity or swelling  EYES: no scleral icterus  CV: regular rhythm, regular rate with intact distal pulses.  Patient has distant heart sounds no obvious murmur appreciated.  RESPIRATORY: normal effort and no respiratory distress.  Very mild expiratory wheeze.  ABDOMEN: soft and non-tender.  Patient is morbidly obese.  MUSCULOSKELETAL: no deformity.  Edema to lower extremities bilateral and symmetric.  Intact distal pulses.  NEURO: alert and appropriate, moves all extremities, follows commands.  No focal motor or sensory changes.  SKIN: warm, dry    Vital signs and nursing notes reviewed.    Plan lab work is unremarkable.  I have reviewed the chest x-ray and EKG.  Is a 1 view chest x-ray and she has the appearance of cardiomegaly.  I  compared to her previous x-ray from 2017 which was a 2 view and is cardiomegaly seems to be more pronounced.  No obvious focal infiltrate.  I informed patient and family in the room plan to admit the patient to the hospital.  Currently she is asymptomatic sitting up in the bed.  Vital signs are unremarkable.  All questions answered at this time.  She agrees with that plan.    We are currently under a pandemic from the COVID19 infection.  The patient presented to the emergency department by ambulance or personal vehicle. I followed the current protocols required by Infection Control at [facility] in my evaluation and treatment of the patient. The patient was wearing a face mask during my evaluation and throughout my encounter. During my whole encounter with this patient I used appropriate personal protective equipment.  This equipment consisted of eye protection, facemask, gown, and gloves.  I applied this equipment before entering the room.    I reviewed old records from [place] hospitalization and discharge in January of this year.  Patient was discharged January 17, 2021.  She presented to [facility] with Covid symptoms.  She ultimately needed to be transferred to the ICU intubated.  She was extubated.  She has been placed upon Pradaxa for A. fib.  Her echo showed an EF of 42% in looking at those records.  I reviewed her discharge medicine.  She is on a beta agonist, oxygen which she states she takes 3 to 5 L.  Aspirin, Coreg, Pradaxa, Lasix, Topamax, Lipitor, Flonase, Avapro, Synthroid, Zoloft.     Aniket Gonzalez MD  07/13/21 2131      Electronically signed by Aniket Gonzalez MD at 07/13/21 1639          Physician Progress Notes (last 72 hours) (Notes from 07/12/21 1155 through 07/15/21 1155)      Colby Kennedy MD at 07/14/21 4835        Attempted to see patient however she is off floor for studies and unable for exam.  Chart reviewed, vascular congestion on cxr cardiomegaly, agree  with echo and cardiac work up/cardiology consult.  D dimer negative.  Will order ct chest to evaluate for post covid pulmonary fibrosis.  Will follow up in morning.  If any issues or change in clinical status overnight please page LPC on call, who can be available at minutes notice for any changes.    D/w Rn.    Colby Kennedy MD  [facility]  21  17:18 EDT      Electronically signed by Colby Kennedy MD at 21 1718          Consult Notes (last 72 hours) (Notes from 21 1155 through 07/15/21 1155)      Filemon Kumari MD at 21 0821          [facility]  Cardiology Consult Note    Patient Identification:  Name: Anne Gentile  Age: 61 y.o.  Sex: female  :  1960  MRN: [id number]             Requesting Physician: Dr. Nolan    Reason for Consultation / Chief Complaint: management recommendations shortness of breath    History of Present Illness:   61-year-old female has been complaining of the increasing shortness of breath, recently had a Covid with cardiac arrest, was on the ventilator for 4 days, continues to complains of shortness of breath, better with the inhaler  Shortness of breath on minimum exertion    No chest pains or tightness in the chest    Comorbid cardiac risk factors:     Past Medical History:  Past Medical History:   Diagnosis Date   • Arthritis    • Atrial fibrillation (CMS/HCC)    • CHF (congestive heart failure) (CMS/HCC)    • COPD (chronic obstructive pulmonary disease) (CMS/HCC)    • Coronary artery disease    • COVID-19    • Depression    • Disease of thyroid gland    • Hypertension      Past Surgical History:  Past Surgical History:   Procedure Laterality Date   • BREAST SURGERY     • CHOLECYSTECTOMY  26+ years ago   • KNEE ARTHROSCOPY Right       Allergies:  No Known Allergies  Home Meds:  Medications Prior to Admission   Medication Sig Dispense Refill Last Dose   • aspirin 81 MG chewable tablet Chew 81 mg Daily.    7/13/2021 at Unknown time   • carvedilol (COREG) 25 MG tablet TAKE ONE TABLET BY MOUTH TWICE A DAY 60 tablet 0 7/13/2021 at Unknown time   • escitalopram (LEXAPRO) 10 MG tablet TAKE ONE TABLET BY MOUTH DAILY   7/13/2021 at Unknown time   • furosemide (LASIX) 20 MG tablet TAKE ONE TABLET BY MOUTH DAILY   7/13/2021 at Unknown time   • irbesartan (AVAPRO) 300 MG tablet TAKE ONE TABLET BY MOUTH DAILY   7/13/2021 at Unknown time   • levothyroxine (SYNTHROID, LEVOTHROID) 150 MCG tablet Take 150 mcg by mouth Daily.   7/13/2021 at Unknown time   • nitroglycerin (NITROSTAT) 0.4 MG SL tablet 1 under the tongue as needed for angina, may repeat q5mins for up three doses 100 tablet 11    • omeprazole (PriLOSEC) 20 MG capsule Take 40 mg by mouth.   7/13/2021 at Unknown time   • Pradaxa 150 MG capsu TAKE ONE CAPSULE BY MOUTH EVERY 12 HOURS 60 capsule 0 7/13/2021 at Unknown time   • sertraline (ZOLOFT) 100 MG tablet    7/13/2021 at Unknown time   • cyclobenzaprine (FLEXERIL) 10 MG tablet       • Diclofenac Sodium (PENNSAID) 2 % solution Apply two pumps to the affected area twice a day prn pain 112 g 12    • topiramate (TOPAMAX) 50 MG tablet TAKE ONE TABLET BY MOUTH DAILY        Current Meds:   [unfilled]  Social History:   Social History     Tobacco Use   • Smoking status: Former Smoker     Packs/day: 1.50     Years: 20.00     Pack years: 30.00     Quit date: 3/23/2011     Years since quitting: 10.3   • Smokeless tobacco: Never Used   Substance Use Topics   • Alcohol use: No      Family History:  Family History   Problem Relation Age of Onset   • Cancer Mother    • COPD Father    • Heart disease Father    • Hypertension Father         Review of Systems    Constitutional: No weakness,fatigue, fever, rigors, chills   Eyes: No vision changes, eye pain   ENT/oropharynx: No difficulty swallowing, sore throat, epistaxis, changes in hearing   Cardiovascular: No chest pain, chest tightness, palpitations, paroxysmal nocturnal dyspnea,  orthopnea, diaphoresis, dizziness / syncopal episode   Respiratory:  Moderate shortness of breath, dyspnea on exertion, cough, wheezing hemoptysis   Gastrointestinal: No abdominal pain, nausea, vomiting, diarrhea, bloody stools   Genitourinary: No hematuria, dysuria   Neurological: No headache, tremors, numbness,  one-sided weakness    Musculoskeletal: No cramps, myalgias,  joint pain, joint swelling   Integument: No rash, edema           Constitutional:  Temp:  [97.5 °F (36.4 °C)-98.7 °F (37.1 °C)] 98.7 °F (37.1 °C)  Heart Rate:  [] 112  Resp:  [16-20] 18  BP: (106-134)/(74-99) 131/85    Physical Exam   General:  Appears in no acute distress  Eyes: PERTL,  HEENT:  No JVD. Thyroid not visibly enlarged. No mucosal pallor or cyanosis  Respiratory: Respirations regular and unlabored at rest. BBS with good air entry in all fields. No crackles, rubs or wheezes auscultated  Cardiovascular: S1S2 Regular rate and rhythm. No murmur, rub or gallop auscultated. No carotid bruits. DP/PT pulses    . No pretibial pitting edema  Gastrointestinal: Abdomen soft, flat, non tender. Bowel sounds present. No hepatosplenomegaly. No ascites  Musculoskeletal: BAUER x4. No abnormal movements  Extremities: No digital clubbing or cyanosis  Skin: Color pink. Skin warm and dry to touch. No rashes  No xanthoma  Neuro: AAO x3 CN II-XII grossly intact              Cardiographics  ECG:     Telemetry:    Echocardiogram:     Imaging  Chest X-ray:     Lab Review   Results from last 7 days   Lab Units 07/13/21  1254   TROPONIN T ng/mL <0.010     Results from last 7 days   Lab Units 07/13/21  2121   MAGNESIUM mg/dL 2.2     Results from last 7 days   Lab Units 07/14/21  0500   SODIUM mmol/L 143   POTASSIUM mmol/L 3.8   BUN mg/dL 17   CREATININE mg/dL 0.69   CALCIUM mg/dL 8.9     @[id number]@  Results from last 7 days   Lab Units 07/14/21  0500 07/13/21  1254   WBC 10*3/mm3 7.00 6.00   HEMOGLOBIN g/dL 11.6* 12.0   HEMATOCRIT % 37.6 37.4   PLATELETS  10*3/mm3 180 186             Assessment:  Shortness of breath  Respiratory insufficiency  Obesity  Hypertension  Recommendations / Plan:   Get pulmonary consult  Stress test tomorrow  Check echocardiogram  Repeat EKG and troponin  Further work-up and recommendations as needed  Labs/tests ordered for am      Filemon Kumari MD  7/14/2021, 08:21 EDT      EMR Dragon/Transcription:   Dictated utilizing Dragon dictation      Electronically signed by Filemon Kumari MD at 07/14/21 6762     All medication doses during the admission are shown, including meds that are no longer on order.  Scheduled Meds Sorted by Name  for Anne Gentile as of 7/13/21 through 7/15/21    1 Day 3 Days 7 Days 10 Days < Today >    Legend:                          Inactive     Active     Other Encounter     Linked                 Medications 07/13/21 07/14/21 07/15/21   aspirin chewable tablet 81 mg   Dose: 81 mg  Freq: Daily Route: PO  Start: 07/14/21 1315    Admin Instructions:   Herbal/drug interaction: Avoid use with ginkgo biloba.  Do not exceed 4 grams of aspirin in a 24 hr period.    If given for pain, use the following pain scale:   Mild Pain = Pain Score of 1-3, CPOT 1-2  Moderate Pain = Pain Score of 4-6, CPOT 3-4  Severe Pain = Pain Score of 7-10, CPOT 5-8     1357          0900            carvedilol (COREG) tablet 25 mg   Dose: 25 mg  Freq: 2 Times Daily With Meals Route: PO  Start: 07/14/21 1800    Admin Instructions:   Give with food.     1841          0800   1800          dabigatran etexilate (PRADAXA) capsule 150 mg   Dose: 150 mg  Freq: Every 12 Hours Route: PO  Indications of Use: ATRIAL FIBRILLATION  Start: 07/14/21 1315    Admin Instructions:   Swallow capsules whole; do not chew, break, or empty the contents of the capsule     8836 9436   1316          escitalopram (LEXAPRO) tablet 10 mg   Dose: 10 mg  Freq: Nightly Route: PO  Start: 07/14/21 2100    Admin Instructions:   Caution: Look alike/sound  alike drug alert.     2003 2100            furosemide (LASIX) injection 40 mg   Dose: 40 mg  Freq: Every 12 Hours Route: IV  Start: 07/14/21 1315     1840 [C]          0606   1315          furosemide (LASIX) injection 40 mg   Dose: 40 mg  Freq: Once Route: IV  Start: 07/14/21 1315 End: 07/14/21 1225     1225-D/C'd           furosemide (LASIX) tablet 20 mg   Dose: 20 mg  Freq: 2 Times Daily (Diuretics) Route: PO  Start: 07/14/21 1315 End: 07/14/21 1225     1225-D/C'd           ipratropium-albuterol (DUO-NEB) nebulizer solution 3 mL   Dose: 3 mL  Freq: Every 4 Hours - RT Route: NEBULIZATION  Start: 07/13/21 2000 2013          0024   0306   0631     1024   1519   (7621) (2698) [C]          (1445)   0642   1150     1530   1930   2330        levothyroxine (SYNTHROID, LEVOTHROID) tablet 150 mcg   Dose: 150 mcg  Freq: Daily Route: PO  Start: 07/14/21 1315    Admin Instructions:   Take on empty stomach.     1434          0900            losartan (COZAAR) tablet 100 mg   Dose: 100 mg  Freq: Every 24 Hours Scheduled Route: PO  Start: 07/14/21 1315     1435          0900            pantoprazole (PROTONIX) EC tablet 40 mg   Dose: 40 mg  Freq: 2 Times Daily Before Meals Route: PO  Start: 07/14/21 2215    Admin Instructions:   Swallow whole; do not crush, split, or chew.     2125          0900   1730          pantoprazole (PROTONIX) EC tablet 40 mg   Dose: 40 mg  Freq: Every Morning Route: PO  Start: 07/15/21 0700 End: 07/14/21 2115    Admin Instructions:   Swallow whole; do not crush, split, or chew.     2115-D/C'd           perflutren (DEFINITY) 8.476 mg in Sodium Chloride (PF) 0.9 % 10 mL injection   Dose: 2 mL  Freq: Once in Imaging Route: IV  Start: 07/14/21 1945 End: 07/14/21 1857    Admin Instructions:   Mix 1.3 mL of mechanically activated Definity with 8.7 mL of normal saline. A total of 2 mL of the resulting Definity solution was administered.     1857             regadenoson (LEXISCAN) injection 0.4 mg    Dose: 0.4 mg  Freq: Once in Imaging Route: IV  Start: 07/15/21 1000 End: 07/15/21 0955    Admin Instructions:   Administer over approximately 10 seconds.      0955            sertraline (ZOLOFT) tablet 100 mg   Dose: 100 mg  Freq: Nightly Route: PO  Start: 07/14/21 2100 2003 2100            technetium sestamibi (CARDIOLITE) injection 1 dose   Dose: 1 dose  Freq: Once in Imaging Route: IV  Start: 07/15/21 1000 End: 07/15/21 0954    Order specific questions:   Millicuries: 36      0954            technetium sestamibi (CARDIOLITE) injection 1 dose   Dose: 1 dose  Freq: Once in Imaging Route: IV  Start: 07/15/21 0645 End: 07/15/21 0635    Order specific questions:   Millicuries: 11      0635            Medications 07/13/21 07/14/21 07/15/21       Continuous Meds Sorted by Name  for McCubbins, Anne as of 7/13/21 through 7/15/21   Legend:                          Inactive     Active     Other Encounter     Linked                 Medications 07/13/21 07/14/21 07/15/21       PRN Meds Sorted by Name  for McCubbins, Anne as of 7/13/21 through 7/15/21   Legend:                          Inactive     Active     Other Encounter     Linked                 Medications 07/13/21 07/14/21 07/15/21   sodium chloride 0.9 % flush 10 mL   Dose: 10 m  PRN Reason: Line Care  Start: 07/13/21 1240

## 2021-07-16 VITALS
SYSTOLIC BLOOD PRESSURE: 122 MMHG | RESPIRATION RATE: 18 BRPM | WEIGHT: 293 LBS | HEART RATE: 91 BPM | DIASTOLIC BLOOD PRESSURE: 75 MMHG | OXYGEN SATURATION: 95 % | HEIGHT: 67 IN | BODY MASS INDEX: 45.99 KG/M2 | TEMPERATURE: 98 F

## 2021-07-16 LAB
ANION GAP SERPL CALCULATED.3IONS-SCNC: 6 MMOL/L (ref 5–15)
BASOPHILS # BLD AUTO: 0.03 10*3/MM3 (ref 0–0.2)
BASOPHILS NFR BLD AUTO: 0.4 % (ref 0–1.5)
BUN SERPL-MCNC: 21 MG/DL (ref 8–23)
BUN/CREAT SERPL: 25 (ref 7–25)
CALCIUM SPEC-SCNC: 8.8 MG/DL (ref 8.6–10.5)
CHLORIDE SERPL-SCNC: 100 MMOL/L (ref 98–107)
CO2 SERPL-SCNC: 35 MMOL/L (ref 22–29)
CREAT SERPL-MCNC: 0.84 MG/DL (ref 0.57–1)
DEPRECATED RDW RBC AUTO: 47.8 FL (ref 37–54)
EOSINOPHIL # BLD AUTO: 0.13 10*3/MM3 (ref 0–0.4)
EOSINOPHIL NFR BLD AUTO: 1.9 % (ref 0.3–6.2)
ERYTHROCYTE [DISTWIDTH] IN BLOOD BY AUTOMATED COUNT: 13.2 % (ref 12.3–15.4)
GFR SERPL CREATININE-BSD FRML MDRD: 69 ML/MIN/1.73
GLUCOSE SERPL-MCNC: 91 MG/DL (ref 65–99)
HCT VFR BLD AUTO: 37.2 % (ref 34–46.6)
HGB BLD-MCNC: 11.2 G/DL (ref 12–15.9)
IMM GRANULOCYTES # BLD AUTO: 0.02 10*3/MM3 (ref 0–0.05)
IMM GRANULOCYTES NFR BLD AUTO: 0.3 % (ref 0–0.5)
LYMPHOCYTES # BLD AUTO: 1.17 10*3/MM3 (ref 0.7–3.1)
LYMPHOCYTES NFR BLD AUTO: 16.8 % (ref 19.6–45.3)
MCH RBC QN AUTO: 29.5 PG (ref 26.6–33)
MCHC RBC AUTO-ENTMCNC: 30.1 G/DL (ref 31.5–35.7)
MCV RBC AUTO: 97.9 FL (ref 79–97)
MONOCYTES # BLD AUTO: 0.86 10*3/MM3 (ref 0.1–0.9)
MONOCYTES NFR BLD AUTO: 12.3 % (ref 5–12)
NEUTROPHILS NFR BLD AUTO: 4.77 10*3/MM3 (ref 1.7–7)
NEUTROPHILS NFR BLD AUTO: 68.3 % (ref 42.7–76)
NRBC BLD AUTO-RTO: 0 /100 WBC (ref 0–0.2)
PLATELET # BLD AUTO: 146 10*3/MM3 (ref 140–450)
PMV BLD AUTO: 10.1 FL (ref 6–12)
POTASSIUM SERPL-SCNC: 4.3 MMOL/L (ref 3.5–5.2)
RBC # BLD AUTO: 3.8 10*6/MM3 (ref 3.77–5.28)
SODIUM SERPL-SCNC: 141 MMOL/L (ref 136–145)
WBC # BLD AUTO: 6.98 10*3/MM3 (ref 3.4–10.8)

## 2021-07-16 PROCEDURE — 94760 N-INVAS EAR/PLS OXIMETRY 1: CPT

## 2021-07-16 PROCEDURE — 94799 UNLISTED PULMONARY SVC/PX: CPT

## 2021-07-16 PROCEDURE — G0378 HOSPITAL OBSERVATION PER HR: HCPCS

## 2021-07-16 PROCEDURE — 97110 THERAPEUTIC EXERCISES: CPT

## 2021-07-16 PROCEDURE — 97162 PT EVAL MOD COMPLEX 30 MIN: CPT

## 2021-07-16 PROCEDURE — 80048 BASIC METABOLIC PNL TOTAL CA: CPT | Performed by: HOSPITALIST

## 2021-07-16 PROCEDURE — 94618 PULMONARY STRESS TESTING: CPT

## 2021-07-16 PROCEDURE — 85025 COMPLETE CBC W/AUTO DIFF WBC: CPT | Performed by: HOSPITALIST

## 2021-07-16 RX ORDER — BUDESONIDE AND FORMOTEROL FUMARATE DIHYDRATE 160; 4.5 UG/1; UG/1
2 AEROSOL RESPIRATORY (INHALATION)
Status: DISCONTINUED | OUTPATIENT
Start: 2021-07-16 | End: 2021-07-16 | Stop reason: HOSPADM

## 2021-07-16 RX ORDER — FUROSEMIDE 20 MG/1
40 TABLET ORAL 2 TIMES DAILY
Qty: 120 TABLET | Refills: 0 | Status: SHIPPED | OUTPATIENT
Start: 2021-07-16 | End: 2021-10-22 | Stop reason: SDUPTHER

## 2021-07-16 RX ORDER — LEVOTHYROXINE SODIUM 175 UG/1
175 TABLET ORAL DAILY
Qty: 30 TABLET | Refills: 0 | Status: SHIPPED | OUTPATIENT
Start: 2021-07-17 | End: 2022-02-02 | Stop reason: HOSPADM

## 2021-07-16 RX ORDER — BUDESONIDE AND FORMOTEROL FUMARATE DIHYDRATE 80; 4.5 UG/1; UG/1
2 AEROSOL RESPIRATORY (INHALATION)
Qty: 1 EACH | Refills: 0 | Status: CANCELLED | OUTPATIENT
Start: 2021-07-16

## 2021-07-16 RX ORDER — BUDESONIDE AND FORMOTEROL FUMARATE DIHYDRATE 160; 4.5 UG/1; UG/1
2 AEROSOL RESPIRATORY (INHALATION)
Qty: 20.4 G | Refills: 0 | Status: SHIPPED | OUTPATIENT
Start: 2021-07-16 | End: 2021-10-22

## 2021-07-16 RX ORDER — LOSARTAN POTASSIUM 50 MG/1
50 TABLET ORAL
Qty: 30 TABLET | Refills: 0 | Status: SHIPPED | OUTPATIENT
Start: 2021-07-17 | End: 2021-08-15

## 2021-07-16 RX ORDER — IPRATROPIUM BROMIDE AND ALBUTEROL SULFATE 2.5; .5 MG/3ML; MG/3ML
3 SOLUTION RESPIRATORY (INHALATION)
Qty: 360 ML | Refills: 0 | Status: SHIPPED | OUTPATIENT
Start: 2021-07-16 | End: 2021-08-19

## 2021-07-16 RX ADMIN — LOSARTAN POTASSIUM 50 MG: 50 TABLET, FILM COATED ORAL at 08:30

## 2021-07-16 RX ADMIN — IPRATROPIUM BROMIDE AND ALBUTEROL SULFATE 3 ML: 2.5; .5 SOLUTION RESPIRATORY (INHALATION) at 07:17

## 2021-07-16 RX ADMIN — IPRATROPIUM BROMIDE AND ALBUTEROL SULFATE 3 ML: 2.5; .5 SOLUTION RESPIRATORY (INHALATION) at 10:59

## 2021-07-16 RX ADMIN — DABIGATRAN ETEXILATE MESYLATE 150 MG: 150 CAPSULE ORAL at 14:07

## 2021-07-16 RX ADMIN — PANTOPRAZOLE SODIUM 40 MG: 40 TABLET, DELAYED RELEASE ORAL at 08:30

## 2021-07-16 RX ADMIN — ASPIRIN 81 MG: 81 TABLET, CHEWABLE ORAL at 08:31

## 2021-07-16 RX ADMIN — CARVEDILOL 25 MG: 25 TABLET, FILM COATED ORAL at 08:31

## 2021-07-16 RX ADMIN — LEVOTHYROXINE SODIUM 175 MCG: 0.17 TABLET ORAL at 08:31

## 2021-07-16 NOTE — PROGRESS NOTES
Exercise Oximetry    Patient Name:Anne Gentile   MRN: [id number]   Date: 07/16/21             ROOM AIR BASELINE   SpO2% 82   Heart Rate 83   Blood Pressure 105/75     EXERCISE ON ROOM AIR SpO2% EXERCISE ON O2 @ 2 LPM SpO2%   1 MINUTE  1 MINUTE 96   2 MINUTES  2 MINUTES 95   3 MINUTES  3 MINUTES    4 MINUTES  4 MINUTES    5 MINUTES  5 MINUTES    6 MINUTES  6 MINUTES               Distance Walked   Distance Walked 20 feet   Dyspnea (Lars Scale)  Dyspnea (Lars Scale) 1   Fatigue (Lars Scale)   Fatigue (Lars Scale) 3   SpO2% Post Exercise   SpO2% Post Exercise 95   HR Post Exercise   HR Post Exercise 84   Time to Recovery   Time to Recovery     Comments: Resting room air saturation 85%. Patient placed on 2lpm. Sats increased to 96%. Patient was only able to ambulate aprrox. 20 feet before complaining of severe knee pain and asking to return to room. Saturation 95-96% on 2lpm.

## 2021-07-16 NOTE — CASE MANAGEMENT/SOCIAL WORK
Case Management Discharge Note      Final Note: Home with roommate. Uatsdin  unable to see due to pt not seeing her PCP in over a year. Pt has home O2 through [employer]. Pt was not wearing Home O2 at Work. [employer] to supply Nebulizer machine and portable concentrator for her to use when she returns to work. Discussed PT recommending outpatient PT but pt refusing. Family to transport. Varun Ayala RN-BC    Provided Post Acute Provider List?: Refused  Refused Provider List Comment: Wants [facility] to see if needed at D/C  Provided Post Acute Provider Quality & Resource List?: Refused    Selected Continued Care - Admitted Since 7/13/2021     Destination    No services have been selected for the patient.              Durable Medical Equipment    No services have been selected for the patient.              Dialysis/Infusion    No services have been selected for the patient.              Home Medical Care    No services have been selected for the patient.              Therapy    No services have been selected for the patient.              Community Resources    No services have been selected for the patient.              Community & DME    No services have been selected for the patient.                Selected Continued Care - Episodes Includes selections from active Coordinated Care Management episodes    Rising Risk Care Management Episode start date: 7/14/2021   There are no active outsourced providers for this episode.               Transportation Services  Private: Car    Final Discharge Disposition Code: 01 - home or self-care

## 2021-07-16 NOTE — THERAPY EVALUATION
Patient Name: Anne Gentile  : 1960    MRN: [id number]                              Today's Date: 2021       Admit Date: 2021    Visit Dx:     ICD-10-CM ICD-9-CM   1. Dyspnea on exertion  R06.00 786.09     Patient Active Problem List   Diagnosis   • Malignant hypertension   • Acute rhinosinusitis   • Knee pain   • Chest pain at rest   • Chest pain   • Chronic headache   • Congestion of respiratory tract   • Depression   • Gastroesophageal reflux disease without esophagitis   • Hypertension   • Hypothyroidism   • Headache   • Morbid obesity (CMS/HCC)   • Other specified disorders of nose and paranasal sinuses   • Difficulty sleeping   • Unstable angina pectoris (CMS/HCC)   • Atrial fibrillation (CMS/HCC)   • A-fib (CMS/HCC)   • H/O amiodarone therapy   • Anticoagulated   • Dyspnea on exertion     Past Medical History:   Diagnosis Date   • Arthritis    • Atrial fibrillation (CMS/HCC)    • CHF (congestive heart failure) (CMS/HCC)    • COPD (chronic obstructive pulmonary disease) (CMS/HCC)    • Coronary artery disease    • COVID-19    • Depression    • Disease of thyroid gland    • Hypertension      Past Surgical History:   Procedure Laterality Date   • BREAST SURGERY     • CHOLECYSTECTOMY  26+ years ago   • KNEE ARTHROSCOPY Right      General Information     Row Name 21 1016          Physical Therapy Time and Intention    Document Type  evaluation  -DB     Mode of Treatment  individual therapy;physical therapy  -DB     Row Name 21 1016          General Information    Patient Profile Reviewed  yes  -DB     Prior Level of Function  independent:;ADL's  -DB     Existing Precautions/Restrictions  no known precautions/restrictions  -DB     Barriers to Rehab  previous functional deficit  -DB     Row Name 21 1016          Living Environment    Lives With  other (see comments) pt has a roommate  -DB     Row Name 21 1016          Home Main Entrance    Number of Stairs, Main  Entrance  three  -DB     Stair Railings, Main Entrance  railings safe and in good condition  -DB     [facility] Name 07/16/21 1016          Stairs Within Home, Primary    Number of Stairs, Within Home, Primary  none  -DB     Row Name 07/16/21 1016          Cognition    Orientation Status (Cognition)  oriented x 4  -DB     Row Name 07/16/21 1016          Safety Issues, Functional Mobility    Impairments Affecting Function (Mobility)  endurance/activity tolerance;pain;strength;balance;shortness of breath  -DB       User Key  (r) = Recorded By, (t) = Taken By, (c) = Cosigned By    Initials Name Provider Type    DB Kelsy Silva, YASMEEN Physical Therapist        Mobility     [facility] Name 07/16/21 1017          Bed Mobility    Bed Mobility  supine-sit  -DB     Supine-Sit [place] (Bed Mobility)  standby assist  -DB     Assistive Device (Bed Mobility)  bed rails;head of bed elevated  -DB     Row Name 07/16/21 1017          Sit-Stand Transfer    Sit-Stand [place] (Transfers)  standby assist  -DB     Assistive Device (Sit-Stand Transfers)  walker, front-wheeled  -DB     Row Name 07/16/21 1017          Gait/Stairs (Locomotion)    [place] Level (Gait)  standby assist  -DB     Assistive Device (Gait)  walker, front-wheeled  -DB     Deviations/Abnormal Patterns (Gait)  gait speed decreased;joellen decreased  -DB     Bilateral Gait Deviations  forward flexed posture;heel strike decreased  -DB       User Key  (r) = Recorded By, (t) = Taken By, (c) = Cosigned By    Initials Name Provider Type    DB Kelsy Silva, YASMEEN Physical Therapist        Obj/Interventions     [facility] Name 07/16/21 1020          Range of Motion Comprehensive    General Range of Motion  no range of motion deficits identified  -DB     Row Name 07/16/21 1020          Strength Comprehensive (MMT)    Comment, General Manual Muscle Testing (MMT) Assessment  BLE >3/5 MMT  -DB     Row Name 07/16/21 1020          Balance    Balance Assessment  sitting static  "balance;sitting dynamic balance;standing static balance;standing dynamic balance  -DB     Static Sitting Balance  WFL  -DB     Dynamic Sitting Balance  WFL  -DB     Static Standing Balance  WFL  -DB     Dynamic Standing Balance  mild impairment  -DB     Balance Interventions  sitting;standing;sit to stand  -DB       User Key  (r) = Recorded By, (t) = Taken By, (c) = Cosigned By    Initials Name Provider Type    DB Kelsy Silva, YASMEEN Physical Therapist        Goals/Plan     Row Name 07/16/21 1027          Gait Training Goal 1 (PT)    Activity/Assistive Device (Gait Training Goal 1, PT)  gait (walking locomotion)  -DB     Slope Level (Gait Training Goal 1, PT)  standby assist  -DB     Distance (Gait Training Goal 1, PT)  75'  -DB     Time Frame (Gait Training Goal 1, PT)  1 week  -DB     Row Name 07/16/21 1027          Stairs Goal 1 (PT)    Activity/Assistive Device (Stairs Goal 1, PT)  stairs, all skills  -DB     Slope Level/Cues Needed (Stairs Goal 1, PT)  contact guard assist  -DB     Number of Stairs (Stairs Goal 1, PT)  3  -DB     Time Frame (Stairs Goal 1, PT)  1 week  -DB       User Key  (r) = Recorded By, (t) = Taken By, (c) = Cosigned By    Initials Name Provider Type    Kelsy Cleveland PT Physical Therapist        Clinical Impression     Row Name 07/16/21 1020          Plan of Care Review    Plan of Care Reviewed With  patient  -DB     Progress  no change  -DB     Outcome Summary  Pt agreeable to PT assessment this date. Pt lives in a ranch style home and has 3 steps to get inside. Pt states she has no difficulty with household mobility, but has difficulty with community ambulation (ie walking into work) which has been the same since January. Pt was educated about seeing an outpatient PT upon D/C from hospital to work on inc'ing endurance and ability to ambulate in the community. Pt also reports she is limited 2/2 knee being \"bone on bone.\" Was educated about outpatient PT being able to " help manage that pain. Pt verbalizes understanding of this. Pt was able to perform bed mobility and ambulation with SBA with use of RW. Pt demo's dec'd endurance and strength and would benefit from skilled PT. Recommending D/C home with outpatient PT services.  -DB     Row Name 07/16/21 1020          Therapy Assessment/Plan (PT)    Rehab Potential (PT)  good, to achieve stated therapy goals  -DB     Criteria for Skilled Interventions Met (PT)  yes  -DB     Row Name 07/16/21 1020          Vital Signs    O2 Delivery Pre Treatment  supplemental O2  -DB     O2 Delivery Intra Treatment  room air  -DB     O2 Delivery Post Treatment  supplemental O2  -DB     Pre Patient Position  Supine  -DB     Intra Patient Position  Standing  -DB     Post Patient Position  Sitting  -DB     Row Name 07/16/21 1020          Positioning and Restraints    Pre-Treatment Position  in bed  -DB     Post Treatment Position  bed  -DB     In Bed  call light within reach;sitting EOB;encouraged to call for assist  -DB       User Key  (r) = Recorded By, (t) = Taken By, (c) = Cosigned By    Initials Name Provider Type    DB Kelsy Silva, PT Physical Therapist        Outcome Measures     Row Name 07/16/21 1027          How much help from another person do you currently need...    Turning from your back to your side while in flat bed without using bedrails?  4  -DB     Moving from lying on back to sitting on the side of a flat bed without bedrails?  4  -DB     Moving to and from a bed to a chair (including a wheelchair)?  4  -DB     Standing up from a chair using your arms (e.g., wheelchair, bedside chair)?  4  -DB     Climbing 3-5 steps with a railing?  3  -DB     To walk in hospital room?  4  -DB     AM-PAC 6 Clicks Score (PT)  23  -DB     Row Name 07/16/21 1027          Functional Assessment    Outcome Measure Options  AM-PAC 6 Clicks Basic Mobility (PT)  -DB       User Key  (r) = Recorded By, (t) = Taken By, (c) = Cosigned By    Initials Name  "Provider Type    DB Kelsy Silva, PT Physical Therapist        Physical Therapy Education                 Title: PT OT SLP Therapies (Done)     Topic: Physical Therapy (Done)     Point: Mobility training (Done)     Learning Progress Summary           Patient Acceptance, E, VU by DB at 7/16/2021 1028                   Point: Home exercise program (Done)     Learning Progress Summary           Patient Acceptance, E, VU by DB at 7/16/2021 1028                   Point: Body mechanics (Done)     Learning Progress Summary           Patient Acceptance, E, VU by DB at 7/16/2021 1028                   Point: Precautions (Done)     Learning Progress Summary           Patient Acceptance, E, VU by DB at 7/16/2021 1028                               User Key     Initials Effective Dates Name Provider Type Discipline    RD 06/16/21 -  Kelsy Silva PT Physical Therapist PT              PT Recommendation and Plan  Planned Therapy Interventions (PT): balance training, bed mobility training, gait training, home exercise program, postural re-education, patient/family education, neuromuscular re-education, motor coordination training, stair training, strengthening, transfer training  Plan of Care Reviewed With: patient  Progress: no change  Outcome Summary: Pt agreeable to PT assessment this date. Pt lives in a ranch style home and has 3 steps to get inside. Pt states she has no difficulty with household mobility, but has difficulty with community ambulation (ie walking into work) which has been the same since January. Pt was educated about seeing an outpatient PT upon D/C from hospital to work on inc'ing endurance and ability to ambulate in the community. Pt also reports she is limited 2/2 knee being \"bone on bone.\" Was educated about outpatient PT being able to help manage that pain. Pt verbalizes understanding of this. Pt was able to perform bed mobility and ambulation with SBA with use of RW. Pt demo's dec'd endurance and " strength and would benefit from skilled PT. Recommending D/C home with outpatient PT services.     Time Calculation:   PT Charges     Row Name 07/16/21 1029             Time Calculation    Start Time  0921  -DB      Stop Time  0932  -DB      Time Calculation (min)  11 min  -DB      PT Received On  07/16/21  -DB      PT - Next Appointment  07/17/21  -DB      PT Goal Re-Cert Due Date  07/23/21  -DB         Time Calculation- PT    Total Timed Code Minutes- PT  8 minute(s)  -DB        User Key  (r) = Recorded By, (t) = Taken By, (c) = Cosigned By    Initials Name Provider Type    DB Kelsy Silva, PT Physical Therapist        Therapy Charges for Today     Code Description Service Date Service Provider Modifiers Qty    00069948901 HC PT EVAL MOD COMPLEXITY 2 7/16/2021 Kelsy Silva, PT GP 1    34930127562 HC PT THER PROC EA 15 MIN 7/16/2021 Kelsy Silva, PT GP 1          PT G-Codes  Outcome Measure Options: AM-PAC 6 Clicks Basic Mobility (PT)  AM-PAC 6 Clicks Score (PT): 23    Kelsy Silva PT  7/16/2021

## 2021-07-16 NOTE — PLAN OF CARE
Goal Outcome Evaluation:  Plan of Care Reviewed With: (P) patient        Progress: no change  Outcome Summary: pt BP low on initial assessment. MD aware, lasix held upon re-check BP had stabilized. PT to re-assess pt again due to pt having significant decrease in activity since January. Will continue to monitor.

## 2021-07-16 NOTE — SIGNIFICANT NOTE
07/16/21 1343   OTHER   Discipline occupational therapist   Rehab Time/Intention   Session Not Performed other (see comments)  (Pt refused a OT eval today. Voiced she is at her baseline and wished to not participate.)

## 2021-07-16 NOTE — PLAN OF CARE
Goal Outcome Evaluation:              Outcome Summary: Pt went for stress test and chest CT this morning. PT to see her again. Lasix and coreg held due to low bp. 93/60. Pt not sympnomatic. MD notified. Will continue to monitor.

## 2021-07-16 NOTE — DISCHARGE SUMMARY
Discharge summary    Date of admission 7/13/2021  Date of discharge 7/16/2021                Final diagnosis  Acute on chronic hypoxic respiratory failure  Acute on chronic diastolic congestive heart failure  Positive stress Cardiolite with medical management  Paroxysmal atrial fibrillation on Pradaxa  Hypertension  Hypothyroidism   Morbidly obese  Obstructive sleep apnea  Depression  Gastroesophageal reflux disease    Discharge medications    Current Facility-Administered Medications:   •  aspirin chewable tablet 81 mg, 81 mg, Oral, Daily, Flip Nolan MD, 81 mg at 07/16/21 0831  •  carvedilol (COREG) tablet 25 mg, 25 mg, Oral, BID With Meals, Flip Nolan MD, 25 mg at 07/16/21 0831  •  dabigatran etexilate (PRADAXA) capsule 150 mg, 150 mg, Oral, Q12H, Flip Nolan MD, 150 mg at 07/15/21 1435  •  escitalopram (LEXAPRO) tablet 10 mg, 10 mg, Oral, Nightly, Flip Nolan MD, 10 mg at 07/15/21 2004  •  ipratropium-albuterol (DUO-NEB) nebulizer solution 3 mL, 3 mL, Nebulization, Q4H - RT, Flip Nolan MD, 3 mL at 07/16/21 1059  •  levothyroxine (SYNTHROID, LEVOTHROID) tablet 175 mcg, 175 mcg, Oral, Daily, Flip Nolan MD, 175 mcg at 07/16/21 0831  •  losartan (COZAAR) tablet 50 mg, 50 mg, Oral, Q24H, Flip Nolan MD, 50 mg at 07/16/21 0830  •  pantoprazole (PROTONIX) EC tablet 40 mg, 40 mg, Oral, BID AC, Flip Nolan MD, 40 mg at 07/16/21 0830  •  sertraline (ZOLOFT) tablet 100 mg, 100 mg, Oral, Nightly, Flip Nolan MD, 100 mg at 07/15/21 2004  •  [COMPLETED] Insert peripheral IV, , , Once **AND** sodium chloride 0.9 % flush 10 mL, 10 mL, Intravenous, PRN, Frantz Johnson PA     Consults obtained  Cardiology  Pulmonary    Procedures  Stress Cardiolite showed ischemia anterior wall and inferior wall    Hospital course  61-year-old white female with history of chronic hypoxic respiratory failure on home oxygen COPD obstructive sleep apnea congestive heart failure coronary disease hypothyroidism admitted to  emergency room with shortness of breath.  Patient work-up revealed acute on chronic hypoxic respiratory failure with acute on chronic diastolic congestive heart failure.  Patient treated with supplemental oxygen nebulizer IV diuretics and followed by cardiology and pulmonary.  Patient 2D echo revealed ejection fraction of 59% with no valvular abnormality and a stress Cardiolite showed ischemia anterior wall inferior wall but cardiology recommend medical management at this time until her pulmonary condition stabilized.  Patient remained chest pain-free and feeling much better.  Patient advised to keep oxygen 24 hours and her diuretics changed by mouth.  Patient remain on beta-blocker ARB aspirin Pradaxa.  Patient Synthroid dose adjusted during this hospitalization.    Discharge diet regular    Activity as tolerated    Medication as above    Follow with primary doctor in 1 week and follow with cardiology and pulmonary per the instruction and take medication as directed    LISA LOBO MD

## 2021-07-16 NOTE — PROGRESS NOTES
"Daily progress note    Chief complaint   Doing better  No new complaints  Wants to go home  Family at bedside      History of present illness  61-year-old white female with history of chronic hypoxic respiratory failure on home oxygen also has COPD congestive heart failure coronary artery disease morbidly obese hypothyroidism who also has had COVID-19 recently and was on ventilator and was discharged after stabilization presented to [facility] emergency room with shortness of breath for last several days to weeks.  Patient also have leg swelling.  Patient denies any fever chills but does have headache.  Patient denies any chest pain but has palpitation off and on.  Patient evaluated in ER found to be in acute on chronic hypoxic story failure admit for management.      REVIEW OF SYSTEMS  Unremarkable     PHYSICAL EXAM   Blood pressure 105/75, pulse 95, temperature 98.2 °F (36.8 °C), temperature source Oral, resp. rate 18, height 170.2 cm (67\"), weight (!) 168 kg (370 lb), SpO2 95 %.    GENERAL: Alert, oriented, chronically ill-appearing, not distressed  HENT: head atraumatic, no nuchal rigidity  EYES: no scleral icterus, EOMI  CV: regular rhythm, regular rate, no murmur  RESPIRATORY: normal effort, CTA  ABDOMEN: soft, nontender bowel sounds positive  MUSCULOSKELETAL: no deformity, FROM, 1+ pedal edema bilaterally  NEURO:   , moves all extremities, follows commands  SKIN: warm, dry     LAB RESULTS  Lab Results (last 24 hours)     Procedure Component Value Units Date/Time    Basic Metabolic Panel [[id number]]  (Abnormal) Collected: 07/16/21 0321    Specimen: Blood Updated: 07/16/21 0537     Glucose 91 mg/dL      BUN 21 mg/dL      Creatinine 0.84 mg/dL      Sodium 141 mmol/L      Potassium 4.3 mmol/L      Comment: Slight hemolysis detected by analyzer. Results may be affected.        Chloride 100 mmol/L      CO2 35.0 mmol/L      Calcium 8.8 mg/dL      eGFR Non African Amer 69 mL/min/1.73      BUN/Creatinine " Ratio 25.0     Anion Gap 6.0 mmol/L     Narrative:      GFR Normal >60  Chronic Kidney Disease <60  Kidney Failure <15      CBC & Differential [[id number]]  (Abnormal) Collected: 07/16/21 0321    Specimen: Blood Updated: 07/16/21 0504    Narrative:      The following orders were created for panel order CBC & Differential.  Procedure                               Abnormality         Status                     ---------                               -----------         ------                     CBC Auto Differential[[id number]]        Abnormal            Final result                 Please view results for these tests on the individual orders.    CBC Auto Differential [[id number]]  (Abnormal) Collected: 07/16/21 0321    Specimen: Blood Updated: 07/16/21 0504     WBC 6.98 10*3/mm3      RBC 3.80 10*6/mm3      Hemoglobin 11.2 g/dL      Hematocrit 37.2 %      MCV 97.9 fL      MCH 29.5 pg      MCHC 30.1 g/dL      RDW 13.2 %      RDW-SD 47.8 fl      MPV 10.1 fL      Platelets 146 10*3/mm3      Neutrophil % 68.3 %      Lymphocyte % 16.8 %      Monocyte % 12.3 %      Eosinophil % 1.9 %      Basophil % 0.4 %      Immature Grans % 0.3 %      Neutrophils, Absolute 4.77 10*3/mm3      Lymphocytes, Absolute 1.17 10*3/mm3      Monocytes, Absolute 0.86 10*3/mm3      Eosinophils, Absolute 0.13 10*3/mm3      Basophils, Absolute 0.03 10*3/mm3      Immature Grans, Absolute 0.02 10*3/mm3      nRBC 0.0 /100 WBC            Study Result    Narrative & Impression   PORTABLE CHEST X-RAY     HISTORY: Shortness of breath.     TECHNIQUE: Portable chest x-ray is correlated with chest x-ray  05/26/2017.     FINDINGS: Even accounting for the apical lordotic projection, the  cardiac silhouette is larger today than before. Central pulmonary  vasculature appears engorged but no interstitial edema or focal  infiltrate is present. There is no effusion or pneumothorax.     IMPRESSION:  Cardiomegaly with central vascular engorgement but no edema.     This  report was finalized on 7/13/2021 2:00 PM by Dr. Christofer Landin M.D.           ECG 12 Lead  Component   Ref Range & Units 7/14/21 0859 7/13/21 2051   QT Interval   ms 372 P  396 P           HEART RATE= 89  bpm  RR Interval= 674  ms  CT Interval=   ms  P Horizontal Axis=   deg  P Front Axis=   deg  QRSD Interval= 112  ms  QT Interval= 372  ms  QRS Axis= -44  deg  T Wave Axis= 20  deg  - ABNORMAL ECG -  Atrial fibrillation  Borderline IVCD with LAD  Borderline T abnormalities, anterior leads  Electronically Signed By:            2D echo showed ejection fraction 59%  Stress Cardiolite showed ischemia anterior wall inferior wall    Current Facility-Administered Medications:   •  aspirin chewable tablet 81 mg, 81 mg, Oral, Daily, Flip Nolan MD, 81 mg at 07/16/21 0831  •  carvedilol (COREG) tablet 25 mg, 25 mg, Oral, BID With Meals, Flip Nolan MD, 25 mg at 07/16/21 0831  •  dabigatran etexilate (PRADAXA) capsule 150 mg, 150 mg, Oral, Q12H, Flip Nolan MD, 150 mg at 07/15/21 1435  •  escitalopram (LEXAPRO) tablet 10 mg, 10 mg, Oral, Nightly, Flip Nolan MD, 10 mg at 07/15/21 2004  •  furosemide (LASIX) injection 40 mg, 40 mg, Intravenous, Q12H, Flip Nolan MD, 40 mg at 07/15/21 0606  •  ipratropium-albuterol (DUO-NEB) nebulizer solution 3 mL, 3 mL, Nebulization, Q4H - RT, Flip Nolan MD, 3 mL at 07/16/21 1059  •  levothyroxine (SYNTHROID, LEVOTHROID) tablet 175 mcg, 175 mcg, Oral, Daily, Flip Nolan MD, 175 mcg at 07/16/21 0831  •  losartan (COZAAR) tablet 50 mg, 50 mg, Oral, Q24H, Flip Nolan MD, 50 mg at 07/16/21 0830  •  pantoprazole (PROTONIX) EC tablet 40 mg, 40 mg, Oral, BID AC, Flip Nolan MD, 40 mg at 07/16/21 0830  •  sertraline (ZOLOFT) tablet 100 mg, 100 mg, Oral, Nightly, Flip Nolan MD, 100 mg at 07/15/21 2004  •  [COMPLETED] Insert peripheral IV, , , Once **AND** sodium chloride 0.9 % flush 10 mL, 10 mL, Intravenous, PRN, Frantz Johnson, PA     ASSESSMENT  Acute on chronic hypoxic  respiratory failure  Acute on chronic diastolic congestive heart failure  Positive stress Cardiolite with medical management  Paroxysmal atrial fibrillation on Pradaxa  Hypertension  Hypothyroidism   Obesity obese  Depression  Gastroesophageal reflux disease    PLAN  Discharge home  Discharge summary dictated    LISA LOBO MD

## 2021-07-16 NOTE — PROGRESS NOTES
"  Daily Progress Note.   [address]  7/16/2021    Patient:  Name:  Anne Gentile  MRN:  [id number]  1960  61 y.o.  female         CC: orozco    Interval History:  Patient is awake alert.  No worsening shortness of breath she feels nebulizer seem to help her a little bit.  No chest pain no hemoptysis no increased cough or sputum production overnight.  No high fevers.  Tolerating diet.  ROS: No fever, no diarrhea, no chest pain  PMFSSH: no change    Physical Exam:  /75 (BP Location: Right arm, Patient Position: Lying)   Pulse 95   Temp 98.2 °F (36.8 °C) (Oral)   Resp 18   Ht 170.2 cm (67\")   Wt (!) 168 kg (370 lb)   SpO2 95%   BMI 57.95 kg/m²   Body mass index is 57.95 kg/m².    Intake/Output Summary (Last 24 hours) at 7/16/2021 1245  Last data filed at 7/16/2021 1117  Gross per 24 hour   Intake 720 ml   Output --   Net 720 ml     General appearance: NAD, conversant   Eyes: anicteric sclerae, moist conjunctivae; no lid-lag;   HENT: Atraumatic; oropharynx Mallampati 4  Neck: Trachea midline; large neck circumference limits exam  Lungs: Distant breath sounds unable to appreciate any crackles or wheeze, with normal respiratory effort and no intercostal retractions  CV: RRR, no MRGs   Abdomen: Morbidly obese bowel sounds positive  Extremities: No peripheral edema or extremity lymphadenopathy  Skin: Normal temperature, turgor and texture; no rash, ulcers or subcutaneous nodules  Psych: Appropriate affect, alert and oriented   Neuro cranial 2-12 grossly tach speech intact moves all extremities    Data Review:  Notable Labs:  Results from last 7 days   Lab Units 07/16/21  0321 07/15/21  0321 07/14/21  0500 07/13/21  1254   WBC 10*3/mm3 6.98 6.17 7.00 6.00   HEMOGLOBIN g/dL 11.2* 10.9* 11.6* 12.0   PLATELETS 10*3/mm3 146 178 180 186     Results from last 7 days   Lab Units 07/16/21  0321 07/15/21  0321 07/14/21  0500 07/13/21 2121 07/13/21  1254   SODIUM mmol/L 141 142 143  --  141 "   POTASSIUM mmol/L 4.3 4.4 3.8 4.1 5.0   CHLORIDE mmol/L 100 100 100  --  101   CO2 mmol/L 35.0* 33.1* 34.1*  --  33.8*   BUN mg/dL 21 17 17  --  17   CREATININE mg/dL 0.84 0.68 0.69  --  0.87   GLUCOSE mg/dL 91 92 128*  --  136*   CALCIUM mg/dL 8.8 8.7 8.9  --  8.8   MAGNESIUM mg/dL  --   --   --  2.2  --    Estimated Creatinine Clearance: 114 mL/min (by C-G formula based on SCr of 0.84 mg/dL).    Results from last 7 days   Lab Units 07/16/21  0321 07/15/21  0321 07/14/21  0500 07/13/21  1421 07/13/21  1254   AST (SGOT) U/L  --  14 8  --  28   ALT (SGPT) U/L  --  13 12  --  13   D DIMER QUANT MCGFEU/mL  --   --   --  0.40  --    PLATELETS 10*3/mm3 146 178 180  --  186             Imaging:  Reviewed chest images personally from past 3 days    Scheduled meds:    aspirin, 81 mg, Oral, Daily  budesonide-formoterol, 2 puff, Inhalation, BID - RT  carvedilol, 25 mg, Oral, BID With Meals  dabigatran etexilate, 150 mg, Oral, Q12H  escitalopram, 10 mg, Oral, Nightly  ipratropium-albuterol, 3 mL, Nebulization, Q4H - RT  levothyroxine, 175 mcg, Oral, Daily  losartan, 50 mg, Oral, Q24H  pantoprazole, 40 mg, Oral, BID AC  sertraline, 100 mg, Oral, Nightly        ASSESSMENT  /  PLAN:  Dyspnea upon exertion  History of Covid pneumonia with acute hypoxic respiratory failure requiring intubation  Morbid obesity  Osteoarthritis  Former smoker     She has immobility given her osteoarthritis she has gained weight with this she is morbidly obese shoulder these are contributing factors however she does state is she gets a significant response to bronchodilators.  I would not expect this if only the above are contributing factors.  CT scan really does not show much in the way of post infectious/inflammatory pulmonary fibrosis from her Covid which is encouraging.        Abnormal stress test noted.  Suspect this is worth investigating with her shortness of breath.    She has no wheeze on exam she has no prior pulmonary history of asthma  COPD she is not a significant smoker.  We will need outpatient pulmonary function testing  Needs weight loss  Needs to exercise status is limited by osteoarthritis  Stress test noted, cardiac cath per cards.    D/w Dr Ovidio Kennedy MD  [facility]  07/16/21  12:45 EDT

## 2021-07-16 NOTE — PLAN OF CARE
"Goal Outcome Evaluation:  Plan of Care Reviewed With: patient        Progress: no change  Outcome Summary: Pt agreeable to PT assessment this date. Pt lives in a ranch style home and has 3 steps to get inside. Pt states she has no difficulty with household mobility, but has difficulty with community ambulation (ie walking into work) which has been the same since January. Pt was educated about seeing an outpatient PT upon D/C from hospital to work on inc'ing endurance and ability to ambulate in the community. Pt also reports she is limited 2/2 knee being \"bone on bone.\" Was educated about outpatient PT being able to help manage that pain. Pt verbalizes understanding of this. Pt was able to perform bed mobility and ambulation with SBA with use of RW. Pt demo's dec'd endurance and strength and would benefit from skilled PT. Recommending D/C home with outpatient PT services.  "

## 2021-07-21 ENCOUNTER — PATIENT OUTREACH (OUTPATIENT)
Dept: CASE MANAGEMENT | Facility: OTHER | Age: 61
End: 2021-07-21

## 2021-07-21 NOTE — OUTREACH NOTE
Care Evaluation    Questions/Answers      Most Recent Value   Annual Wellness Visit:   Patient Will Schedule   Care Gaps Addressed  Diabetic A1C   HbA1c Status  Up to Date-within defined limits   HbA1c Completion at [facility] or Other  [facility]   Other Patient Education/Resources   24/7 [facility] Healthcare Nurse Call Line   24/7 Nurse Call Line Education Method  Send Materials   Advanced Directives:  -- [address on future call ]   Medication Adherence  Medications understood   Healthy Lifestyle (Self-Efficacy)  self-monitors vital signs appropriately      General & Health Literacy Assessment    Questions/Answers      Most Recent Value   Assessment Completed With  Patient   Living Arrangement  Friends   Type of Residence  Private Residence   Home Care Services  No   Equiptment Used at Home  Oxygen/Respiratory Treatment   Communication Device  No   Bed or Wheelchair Confined  No   Difficulty Keeping Appointments  No   Christian or Spiritual Beliefs that Impact Treatment  No   Chronic Pain  No   How often do you have someone help you read hospital materials?  Never   How often do you have problems learning about your medical condition because of difficulty understanding written information?  Never   How often do you have a problem understanding what is told to you about your medical condition?  Never   How confident are you filling out medical forms by yourself?  Extremely   Health Literacy  Good      Ambulatory Case Management Note    Care Plan: Hypertension   Updates made since 7/21/2021 12:00 AM      Problem: BLOOD PRESSURE MONITORING Deleted 7/21/2021      Goal: Establish Regular Follow-Ups with PCP Deleted 7/21/2021      Task: Refer patient to PCP Completed 7/21/2021   Responsible User: Megan Taylor RN      Task: Discuss schedule for PCP visits with patient Completed 7/21/2021   Responsible User: Megan Taylor RN      Problem: MEDICATION ADHERENCE       Goal: Consistently take medications as prescribed       Task:  Discuss barriers to medication adherence with patient    Due Date: 1/21/2022   Responsible User: Megan Taylor RN   Note:    On each call verify that Anne is taking all medications as prescribed.        Problem: PATIENT IS INACTIVE       Goal: Exercise at least 20 minutes per day       Task: Develop exercise plan with patient    Due Date: 1/21/2022   Responsible User: Megan Taylor RN   Note:    Work with Patient on a regular exercise plan as soon as she is able to participate.       Problem: PATIENT IS HYPERTENSIVE       Goal: Remain at or Below Target Blood Pressure       Task: Discuss steps to manage BP with patient    Due Date: 1/21/2022   Responsible User: Megan Taylor, ANGE   Note:    Encourage regular BP checks and encourage Anne to keep a log to share with MD.       Task: Educate on keeping a log Completed 7/21/2021   Responsible User: Megan Taylor RN        Patient Outreach    I enrolled Anne 7/21/2021 after she reported to the ed with continued SOB, chronic headaches and blurred vision.  She was inpt from 7/13 to 7/16.  She was diagnosed with Covid in January of 2021 and is still suffering from symptoms.  She was inpt at [facility] at that time and was on a ventilator.  She has hypertension and hypothyroidism.  We reviewed some of her labs today.  Her TSH was 12 but they have increased her thyroid medications.      She has an upcoming check up with her pcp ([facility] pcp) on 7/26/2021.  I encouraged her to call and make sure it is coded as an annual physical.  She has an upcoming appointment with her cardiologist and then on 9/15/2021 she will see a pulmonologist for the first time.  We talked about the [facility] clinic and I suggested she discuss this with her pcp.      Educated on availability of nurseline and its use.  All basic needs are met. No issue with social determinants.  No inabilities to obtain food or medications or transportation to MD appointments. Educated on participating in  habits that prevent the spread of COVID virus with home & work hygiene. Patient verbalizes understanding.  Educated patient on benefits of Employee CM program and invited to call with any new needs.     Next call: review md appointments        Megan Taylor, RN  Ambulatory Case Management    7/21/2021, 15:45 EDT  Megan JACOBS, RN, [facility]   [place] [address]     [phone] cell   [phone] office  [phone] fax  [email or web address]

## 2021-08-04 RX ORDER — LOSARTAN POTASSIUM 50 MG/1
50 TABLET ORAL
Qty: 30 TABLET | Refills: 0 | Status: CANCELLED | OUTPATIENT
Start: 2021-08-04 | End: 2021-09-03

## 2021-08-18 RX ORDER — LEVOTHYROXINE SODIUM 175 UG/1
175 TABLET ORAL DAILY
Qty: 30 TABLET | Refills: 0 | Status: CANCELLED | OUTPATIENT
Start: 2021-08-18 | End: 2021-09-17

## 2021-08-19 ENCOUNTER — OFFICE VISIT (OUTPATIENT)
Dept: CARDIOLOGY | Facility: CLINIC | Age: 61
End: 2021-08-19

## 2021-08-19 VITALS
SYSTOLIC BLOOD PRESSURE: 143 MMHG | DIASTOLIC BLOOD PRESSURE: 84 MMHG | HEIGHT: 67 IN | BODY MASS INDEX: 57.95 KG/M2 | HEART RATE: 92 BPM

## 2021-08-19 DIAGNOSIS — R07.2 PRECORDIAL PAIN: ICD-10-CM

## 2021-08-19 DIAGNOSIS — Z92.29 H/O AMIODARONE THERAPY: ICD-10-CM

## 2021-08-19 DIAGNOSIS — Z79.01 ANTICOAGULATED: ICD-10-CM

## 2021-08-19 DIAGNOSIS — R94.39 ABNORMAL NUCLEAR STRESS TEST: Primary | ICD-10-CM

## 2021-08-19 DIAGNOSIS — R06.02 SOB (SHORTNESS OF BREATH): ICD-10-CM

## 2021-08-19 DIAGNOSIS — I10 ESSENTIAL HYPERTENSION: ICD-10-CM

## 2021-08-19 PROCEDURE — 93000 ELECTROCARDIOGRAM COMPLETE: CPT | Performed by: INTERNAL MEDICINE

## 2021-08-19 PROCEDURE — 99214 OFFICE O/P EST MOD 30 MIN: CPT | Performed by: INTERNAL MEDICINE

## 2021-08-19 NOTE — PROGRESS NOTES
HOSPITAL FOLLOW UP   Subjective:        Anne Gentile is a 61 y.o. female who here for follow up    CC  STILL SOB  ABNORMAL CARDIAC FUN TEST  HPI  61-year-old female with known history of atypical chest pains, benign essential arterial hypertension and amiodarone therapy still complains of shortness of breath had a stress test which is abnormal with no chest pain     Problems Addressed this Visit        Cardiac and Vasculature    Chest pain    Hypertension    H/O amiodarone therapy    Abnormal nuclear stress test - Primary    Relevant Orders    Case Request Cath Lab: Left Heart Cath (Completed)    CBC & Differential    Basic Metabolic Panel    aPTT    Protime-INR       Coag and Thromboembolic    Anticoagulated      Other Visit Diagnoses     SOB (shortness of breath)          Diagnoses       Codes Comments    Abnormal nuclear stress test    -  Primary ICD-10-CM: R94.39  ICD-9-CM: 794.39     SOB (shortness of breath)     ICD-10-CM: R06.02  ICD-9-CM: 786.05     H/O amiodarone therapy     ICD-10-CM: Z92.29  ICD-9-CM: V87.49     Precordial pain     ICD-10-CM: R07.2  ICD-9-CM: 786.51     Essential hypertension     ICD-10-CM: I10  ICD-9-CM: 401.9     Anticoagulated     ICD-10-CM: Z79.01  ICD-9-CM: V58.61         .Interpretation Summary    · Calculated left ventricular EF = 59% Estimated left ventricular EF was in agreement with the calculated left ventricular EF.  · Left ventricular diastolic function was indeterminate.  · The right ventricular cavity is borderline dilated.  · Left atrial volume is mildly increased.  · The right atrial cavity is mildly dilated.         The following portions of the patient's history were reviewed and updated as appropriate: allergies, current medications, past family history, past medical history, past social history, past surgical history and problem list.    Past Medical History:   Diagnosis Date   • Arthritis    • Atrial fibrillation (CMS/HCC)    • CHF (congestive heart failure)  "(CMS/[facility])    • COPD (chronic obstructive pulmonary disease) (CMS/[facility])    • Coronary artery disease    • COVID-19    • Depression    • Disease of thyroid gland    • Hypertension      reports that she quit smoking about 10 years ago. She has a 30.00 pack-year smoking history. She has never used smokeless tobacco. She reports that she does not drink alcohol and does not use drugs.   Family History   Problem Relation Age of Onset   • Cancer Mother    • COPD Father    • Heart disease Father    • Hypertension Father        Review of Systems  Constitutional: No wt loss, fever, fatigue  Gastrointestinal: No nausea, abdominal pain  Behavioral/Psych: No insomnia or anxiety   Cardiovascular shortness of breath no chest pain  Objective:       Physical Exam  /84   Pulse 92   Ht 170.2 cm (67\")   BMI 57.95 kg/m²   General appearance: No acute changes   Neck: Trachea midline; NECK, supple, no thyromegaly or lymphadenopathy   Lungs: Normal size and shape, normal breath sounds, equal distribution of air, no rales and rhonchi   CV: S1-S2 regular, no murmurs, no rub, no gallop   Abdomen: Soft, non-tender; no masses , no abnormal abdominal sounds   Extremities: No deformity , normal color , no peripheral edema   Skin: Normal temperature, turgor and texture; no rash, ulcers            ECG 12 Lead    Date/Time: 8/19/2021 1:01 PM  Performed by: Filemon Kumari MD  Authorized by: Filemon Kumari MD   Comparison: compared with previous ECG   Similar to previous ECG  Rhythm: sinus rhythm  ST Flattening: all    Clinical impression: non-specific ECG              Echocardiogram:        Current Outpatient Medications:   •  aspirin 81 MG chewable tablet, Chew 81 mg Daily., Disp: , Rfl:   •  budesonide-formoterol (SYMBICORT) 160-4.5 MCG/ACT inhaler, Inhale 2 puffs 2 (Two) Times a Day for 30 days., Disp: 20.4 g, Rfl: 0  •  carvedilol (COREG) 25 MG tablet, TAKE ONE TABLET BY MOUTH TWICE A DAY, Disp: 60 tablet, Rfl: 0  •  " dabigatran etexilate (Pradaxa) 150 MG capsu, TAKE ONE CAPSULE BY MOUTH EVERY 12 HOURS, Disp: 60 capsule, Rfl: 0  •  escitalopram (LEXAPRO) 10 MG tablet, TAKE ONE TABLET BY MOUTH DAILY, Disp: , Rfl:   •  furosemide (LASIX) 20 MG tablet, Take 2 tablets by mouth 2 (Two) Times a Day for 30 days., Disp: 120 tablet, Rfl: 0  •  ipratropium-albuterol (DUO-NEB) 0.5-2.5 mg/3 ml nebulizer, Take 3 mL by nebulization Every 4 (Four) Hours for 30 days., Disp: 360 mL, Rfl: 0  •  levothyroxine (SYNTHROID, LEVOTHROID) 175 MCG tablet, Take 1 tablet by mouth Daily for 30 days., Disp: 30 tablet, Rfl: 0  •  losartan (COZAAR) 50 MG tablet, Take 1 tablet by mouth Daily for 30 days., Disp: 30 tablet, Rfl: 0  •  omeprazole (priLOSEC) 20 MG capsule, Take 1 capsule by mouth Daily., Disp: 90 capsule, Rfl: 0  •  sertraline (ZOLOFT) 100 MG tablet, Take 1 tablet by mouth Daily., Disp: 90 tablet, Rfl: 1  •  nitroglycerin (NITROSTAT) 0.4 MG SL tablet, 1 under the tongue as needed for angina, may repeat q5mins for up three doses, Disp: 100 tablet, Rfl: 11  •  omeprazole (PriLOSEC) 20 MG capsule, Take 40 mg by mouth., Disp: , Rfl:   •  sertraline (ZOLOFT) 100 MG tablet, , Disp: , Rfl:    Assessment:        Patient Active Problem List   Diagnosis   • Malignant hypertension   • Acute rhinosinusitis   • Knee pain   • Chest pain at rest   • Chest pain   • Chronic headache   • Congestion of respiratory tract   • Depression   • Gastroesophageal reflux disease without esophagitis   • Hypertension   • Hypothyroidism   • Headache   • Morbid obesity (CMS/HCC)   • Other specified disorders of nose and paranasal sinuses   • Difficulty sleeping   • Unstable angina pectoris (CMS/HCC)   • Atrial fibrillation (CMS/HCC)   • A-fib (CMS/HCC)   • H/O amiodarone therapy   • Anticoagulated   • Dyspnea on exertion               Plan:            ICD-10-CM ICD-9-CM   1. Abnormal nuclear stress test  R94.39 794.39   2. SOB (shortness of breath)  R06.02 786.05   3. H/O  amiodarone therapy  Z92.29 V87.49   4. Precordial pain  R07.2 786.51   5. Essential hypertension  I10 401.9   6. Anticoagulated  Z79.01 V58.61     1. Abnormal nuclear stress test  Procedure, risks and options of cardiac cath explained to pt INCLUDING BUT NOT LIMITED TO MI, STROKE, DEATH, INFECTION HAEMORRHAGE, . Pt understands well and agrees with no further questions.    - Case Request Cath Lab: Left Heart Cath  - CBC & Differential; Future  - Basic Metabolic Panel  - aPTT; Future  - Protime-INR; Future    2. SOB (shortness of breath)  Multifactorial    3. H/O amiodarone therapy  Anne Gentile IS ON AMIODARONE,   Significant side effects associated with this medication has been explained     Pros and cons of the medications has been discussed, decision has been to continue the medication   6 months to yearly checkup for eye examination, thyroid function test, chest x-ray and liver function test has been advised    Patient understands well      4. Precordial pain  Atypical    5. Essential hypertension  Pressure controlled    6. Anticoagulated  Continue current treatment       Procedure, risks and options of cardiac cath explained to pt INCLUDING BUT NOT LIMITED TO MI, STROKE, DEATH, INFECTION HAEMORRHAGE, . Pt understands well and agrees with no further questions.    COUNSELING:    Anne ReidKendrickeling was given to patient for the following topics: diagnostic results, risk factor reductions, impressions, risks and benefits of treatment options and importance of treatment compliance .       SMOKING COUNSELING:    [unfilled]    Dictated using Dragon dictation

## 2021-09-17 ENCOUNTER — HOSPITAL ENCOUNTER (OUTPATIENT)
Dept: CARDIOLOGY | Facility: HOSPITAL | Age: 61
Discharge: HOME | End: 2021-09-17
Admitting: INTERNAL MEDICINE

## 2021-09-17 ENCOUNTER — TRANSCRIBE ORDERS (OUTPATIENT)
Dept: SLEEP MEDICINE | Facility: HOSPITAL | Age: 61
End: 2021-09-17

## 2021-09-17 DIAGNOSIS — Z01.818 OTHER SPECIFIED PRE-OPERATIVE EXAMINATION: Primary | ICD-10-CM

## 2021-09-17 DIAGNOSIS — R94.39 ABNORMAL NUCLEAR STRESS TEST: ICD-10-CM

## 2021-09-17 LAB
ANION GAP SERPL CALCULATED.3IONS-SCNC: 13.7 MMOL/L (ref 5–15)
APTT PPP: 57.6 SECONDS (ref 22.7–35.4)
BASOPHILS # BLD AUTO: 0.05 10*3/MM3 (ref 0–0.2)
BASOPHILS NFR BLD AUTO: 0.8 % (ref 0–1.5)
BUN SERPL-MCNC: 14 MG/DL (ref 8–23)
BUN/CREAT SERPL: 15.7 (ref 7–25)
CALCIUM SPEC-SCNC: 9.8 MG/DL (ref 8.6–10.5)
CHLORIDE SERPL-SCNC: 100 MMOL/L (ref 98–107)
CO2 SERPL-SCNC: 29.3 MMOL/L (ref 22–29)
CREAT SERPL-MCNC: 0.89 MG/DL (ref 0.57–1)
DEPRECATED RDW RBC AUTO: 45.4 FL (ref 37–54)
EOSINOPHIL # BLD AUTO: 0.1 10*3/MM3 (ref 0–0.4)
EOSINOPHIL NFR BLD AUTO: 1.6 % (ref 0.3–6.2)
ERYTHROCYTE [DISTWIDTH] IN BLOOD BY AUTOMATED COUNT: 13.5 % (ref 12.3–15.4)
GFR SERPL CREATININE-BSD FRML MDRD: 64 ML/MIN/1.73
GLUCOSE SERPL-MCNC: 127 MG/DL (ref 65–99)
HCT VFR BLD AUTO: 39.4 % (ref 34–46.6)
HGB BLD-MCNC: 12.7 G/DL (ref 12–15.9)
IMM GRANULOCYTES # BLD AUTO: 0.01 10*3/MM3 (ref 0–0.05)
IMM GRANULOCYTES NFR BLD AUTO: 0.2 % (ref 0–0.5)
INR PPP: 1.41 (ref 0.9–1.1)
LYMPHOCYTES # BLD AUTO: 1.07 10*3/MM3 (ref 0.7–3.1)
LYMPHOCYTES NFR BLD AUTO: 16.7 % (ref 19.6–45.3)
MCH RBC QN AUTO: 29.6 PG (ref 26.6–33)
MCHC RBC AUTO-ENTMCNC: 32.2 G/DL (ref 31.5–35.7)
MCV RBC AUTO: 91.8 FL (ref 79–97)
MONOCYTES # BLD AUTO: 0.43 10*3/MM3 (ref 0.1–0.9)
MONOCYTES NFR BLD AUTO: 6.7 % (ref 5–12)
NEUTROPHILS NFR BLD AUTO: 4.74 10*3/MM3 (ref 1.7–7)
NEUTROPHILS NFR BLD AUTO: 74 % (ref 42.7–76)
NRBC BLD AUTO-RTO: 0 /100 WBC (ref 0–0.2)
PLATELET # BLD AUTO: 175 10*3/MM3 (ref 140–450)
PMV BLD AUTO: 10.9 FL (ref 6–12)
POTASSIUM SERPL-SCNC: 4.3 MMOL/L (ref 3.5–5.2)
PROTHROMBIN TIME: 17.1 SECONDS (ref 11.7–14.2)
RBC # BLD AUTO: 4.29 10*6/MM3 (ref 3.77–5.28)
SODIUM SERPL-SCNC: 143 MMOL/L (ref 136–145)
WBC # BLD AUTO: 6.4 10*3/MM3 (ref 3.4–10.8)

## 2021-09-17 PROCEDURE — 80048 BASIC METABOLIC PNL TOTAL CA: CPT | Performed by: INTERNAL MEDICINE

## 2021-09-17 PROCEDURE — 85730 THROMBOPLASTIN TIME PARTIAL: CPT | Performed by: INTERNAL MEDICINE

## 2021-09-17 PROCEDURE — 85610 PROTHROMBIN TIME: CPT | Performed by: INTERNAL MEDICINE

## 2021-09-17 PROCEDURE — 36415 COLL VENOUS BLD VENIPUNCTURE: CPT | Performed by: INTERNAL MEDICINE

## 2021-09-17 PROCEDURE — 85025 COMPLETE CBC W/AUTO DIFF WBC: CPT | Performed by: INTERNAL MEDICINE

## 2021-09-20 ENCOUNTER — TRANSCRIBE ORDERS (OUTPATIENT)
Dept: ADMINISTRATIVE | Facility: HOSPITAL | Age: 61
End: 2021-09-20

## 2021-09-20 DIAGNOSIS — R91.8 LUNG NODULES: Primary | ICD-10-CM

## 2021-09-22 ENCOUNTER — LAB (OUTPATIENT)
Dept: LAB | Facility: HOSPITAL | Age: 61
End: 2021-09-22

## 2021-09-22 DIAGNOSIS — Z01.818 OTHER SPECIFIED PRE-OPERATIVE EXAMINATION: ICD-10-CM

## 2021-09-22 LAB — SARS-COV-2 ORF1AB RESP QL NAA+PROBE: NOT DETECTED

## 2021-09-22 PROCEDURE — C9803 HOPD COVID-19 SPEC COLLECT: HCPCS

## 2021-09-22 PROCEDURE — U0004 COV-19 TEST NON-CDC HGH THRU: HCPCS

## 2021-09-24 ENCOUNTER — HOSPITAL ENCOUNTER (OUTPATIENT)
Facility: HOSPITAL | Age: 61
Setting detail: HOSPITAL OUTPATIENT SURGERY
Discharge: HOME | End: 2021-09-24
Attending: INTERNAL MEDICINE | Admitting: INTERNAL MEDICINE

## 2021-09-24 VITALS
DIASTOLIC BLOOD PRESSURE: 94 MMHG | HEART RATE: 84 BPM | BODY MASS INDEX: 45.99 KG/M2 | HEIGHT: 67 IN | OXYGEN SATURATION: 94 % | TEMPERATURE: 97 F | RESPIRATION RATE: 18 BRPM | SYSTOLIC BLOOD PRESSURE: 138 MMHG | WEIGHT: 293 LBS

## 2021-09-24 DIAGNOSIS — R07.2 PRECORDIAL PAIN: ICD-10-CM

## 2021-09-24 DIAGNOSIS — R94.39 ABNORMAL NUCLEAR STRESS TEST: ICD-10-CM

## 2021-09-24 LAB — ACT BLD: 329 SECONDS (ref 82–152)

## 2021-09-24 PROCEDURE — 25010000002 FENTANYL CITRATE (PF) 50 MCG/ML SOLUTION: Performed by: INTERNAL MEDICINE

## 2021-09-24 PROCEDURE — 0 IOPAMIDOL PER 1 ML: Performed by: INTERNAL MEDICINE

## 2021-09-24 PROCEDURE — 25010000002 PHENYLEPHRINE PER 1 ML: Performed by: INTERNAL MEDICINE

## 2021-09-24 PROCEDURE — 25010000002 MIDAZOLAM PER 1 MG: Performed by: INTERNAL MEDICINE

## 2021-09-24 PROCEDURE — C1874 STENT, COATED/COV W/DEL SYS: HCPCS | Performed by: INTERNAL MEDICINE

## 2021-09-24 PROCEDURE — 25010000002 HEPARIN (PORCINE) PER 1000 UNITS: Performed by: INTERNAL MEDICINE

## 2021-09-24 PROCEDURE — C9600 PERC DRUG-EL COR STENT SING: HCPCS | Performed by: INTERNAL MEDICINE

## 2021-09-24 PROCEDURE — 93458 L HRT ARTERY/VENTRICLE ANGIO: CPT | Performed by: INTERNAL MEDICINE

## 2021-09-24 PROCEDURE — C1725 CATH, TRANSLUMIN NON-LASER: HCPCS | Performed by: INTERNAL MEDICINE

## 2021-09-24 PROCEDURE — C1769 GUIDE WIRE: HCPCS | Performed by: INTERNAL MEDICINE

## 2021-09-24 PROCEDURE — C1894 INTRO/SHEATH, NON-LASER: HCPCS | Performed by: INTERNAL MEDICINE

## 2021-09-24 PROCEDURE — 92928 PRQ TCAT PLMT NTRAC ST 1 LES: CPT | Performed by: INTERNAL MEDICINE

## 2021-09-24 PROCEDURE — C1887 CATHETER, GUIDING: HCPCS | Performed by: INTERNAL MEDICINE

## 2021-09-24 PROCEDURE — 85347 COAGULATION TIME ACTIVATED: CPT

## 2021-09-24 DEVICE — XIENCE SIERRA™ EVEROLIMUS ELUTING CORONARY STENT SYSTEM 4.00 MM X 15 MM / RAPID-EXCHANGE
Type: IMPLANTABLE DEVICE | Status: FUNCTIONAL
Brand: XIENCE SIERRA™

## 2021-09-24 RX ORDER — LIDOCAINE HYDROCHLORIDE 20 MG/ML
INJECTION, SOLUTION INFILTRATION; PERINEURAL AS NEEDED
Status: DISCONTINUED | OUTPATIENT
Start: 2021-09-24 | End: 2021-09-24 | Stop reason: HOSPADM

## 2021-09-24 RX ORDER — ACETAMINOPHEN 325 MG/1
650 TABLET ORAL EVERY 4 HOURS PRN
Status: DISCONTINUED | OUTPATIENT
Start: 2021-09-24 | End: 2021-09-24 | Stop reason: HOSPADM

## 2021-09-24 RX ORDER — FENTANYL CITRATE 50 UG/ML
INJECTION, SOLUTION INTRAMUSCULAR; INTRAVENOUS AS NEEDED
Status: DISCONTINUED | OUTPATIENT
Start: 2021-09-24 | End: 2021-09-24 | Stop reason: HOSPADM

## 2021-09-24 RX ORDER — MIDAZOLAM HYDROCHLORIDE 1 MG/ML
INJECTION INTRAMUSCULAR; INTRAVENOUS AS NEEDED
Status: DISCONTINUED | OUTPATIENT
Start: 2021-09-24 | End: 2021-09-24 | Stop reason: HOSPADM

## 2021-09-24 RX ORDER — SODIUM CHLORIDE 0.9 % (FLUSH) 0.9 %
10 SYRINGE (ML) INJECTION AS NEEDED
Status: DISCONTINUED | OUTPATIENT
Start: 2021-09-24 | End: 2021-09-24 | Stop reason: HOSPADM

## 2021-09-24 RX ORDER — LIDOCAINE HYDROCHLORIDE 10 MG/ML
0.1 INJECTION, SOLUTION EPIDURAL; INFILTRATION; INTRACAUDAL; PERINEURAL ONCE AS NEEDED
Status: DISCONTINUED | OUTPATIENT
Start: 2021-09-24 | End: 2021-09-24 | Stop reason: HOSPADM

## 2021-09-24 RX ORDER — ASPIRIN 81 MG/1
TABLET, CHEWABLE ORAL AS NEEDED
Status: DISCONTINUED | OUTPATIENT
Start: 2021-09-24 | End: 2021-09-24 | Stop reason: HOSPADM

## 2021-09-24 RX ORDER — SODIUM CHLORIDE 9 MG/ML
75 INJECTION, SOLUTION INTRAVENOUS CONTINUOUS
Status: DISCONTINUED | OUTPATIENT
Start: 2021-09-24 | End: 2021-09-24 | Stop reason: HOSPADM

## 2021-09-24 RX ORDER — SODIUM CHLORIDE 0.9 % (FLUSH) 0.9 %
3 SYRINGE (ML) INJECTION EVERY 12 HOURS SCHEDULED
Status: DISCONTINUED | OUTPATIENT
Start: 2021-09-24 | End: 2021-09-24 | Stop reason: HOSPADM

## 2021-09-24 RX ORDER — PANTOPRAZOLE SODIUM 40 MG/1
40 TABLET, DELAYED RELEASE ORAL ONCE
Status: COMPLETED | OUTPATIENT
Start: 2021-09-24 | End: 2021-09-24

## 2021-09-24 RX ORDER — HEPARIN SODIUM 1000 [USP'U]/ML
INJECTION, SOLUTION INTRAVENOUS; SUBCUTANEOUS AS NEEDED
Status: DISCONTINUED | OUTPATIENT
Start: 2021-09-24 | End: 2021-09-24 | Stop reason: HOSPADM

## 2021-09-24 RX ADMIN — ACETAMINOPHEN 650 MG: 325 TABLET, FILM COATED ORAL at 15:34

## 2021-09-24 RX ADMIN — PANTOPRAZOLE SODIUM 40 MG: 40 TABLET, DELAYED RELEASE ORAL at 11:47

## 2021-09-24 RX ADMIN — SODIUM CHLORIDE 75 ML/HR: 9 INJECTION, SOLUTION INTRAVENOUS at 09:10

## 2021-09-24 RX ADMIN — ACETAMINOPHEN 650 MG: 325 TABLET, FILM COATED ORAL at 11:47

## 2021-09-24 NOTE — DISCHARGE INSTRUCTIONS
Please stop your Pradaxa for 3 days.  You may resume taking the pradaxa on Monday September 27th.  At your two week follow up appointment. Please talk to Dr. Salgado about taking you off of Aspirin.         [facility] [place] [address]    Coronary Angioplasty with or without  Stent (Radial Approach) After Care    Refer to this sheet in the next few weeks. These instructions provide you with information on caring for yourself after your procedure. Your health care provider may also give you more specific instructions. Your treatment has been planned according to current medical practices, but problems sometimes occur. Call your health care provider if you have any problems or questions after your procedure.       Home Care Instructions:  · You may shower the day after the procedure. Remove the bandage (dressing) and gently wash the site with plain soap and water. Gently pat the site dry. You may apply a band aid daily for 2 days if desired.    · Do not apply powder or lotion to the site.  · Do not submerge the affected site in water for 3 to 5 days or until the site is completely healed.   · Do not flex or bend at the wrist with affected arm for 24 hours.  · Do not lift, push or pull anything over 5 pounds for 5 days after your procedure or as directed by your physician.  For a reference, a gallon of milk weighs 8 pounds.    · Do not operate machinery or power tools for 24 hours.  · Inspect the site at least twice daily. You may notice some bruising at the site and it may be tender for 1 to 2 weeks.     · Increase your fluid intake for the next 2 days.    · Keep arm elevated for 24 hours.  For the remainder of the day, keep your arm in the “Pledge of Allegiance” position when up and about.    · Limit your activity for the next 48 hours and avoid strenuous activity as directed by your physician.   · Cardiac Rehab may or may not be ordered.  Please consult with your  physician  · You may drive 24 hours after the procedure unless otherwise instructed by your caregiver.  · A responsible adult should be with you for the first 24 hours after you arrive home.   · Do not make any important legal decisions or sign legal papers for 24 hours. Do not drink alcohol for 24 hours.    · Take medicines only as directed by your health care provider.  Blood thinners may be prescribed after your procedure to improve blood flow through the stent.    · Metformin or any medications containing Metformin should not be taken for 48 hours after your procedure.    · Eat a heart-healthy diet. This should include plenty of fresh fruits and vegetables. Meat should be lean cuts. Avoid the following types of food:    ¨ Food that is high in salt.    ¨ Canned or highly processed food.    ¨ Food that is high in saturated fat or sugar.    ¨ Fried food.    · Make any other lifestyle changes recommended by your health care provider. This may include:    ¨ Not using any tobacco products including cigarettes, chewing tobacco, or electronic cigarettes.   ¨ Managing your weight.    ¨ Getting regular exercise.    ¨ Managing your blood pressure.    ¨ Limiting your alcohol intake.    ¨ Managing other health problems, such as diabetes.    · If you need an MRI after your heart stent was placed, be sure to tell the health care provider who orders the MRI that you have a heart stent.    · Keep all follow-up visits as directed by your health care provider.    ·   Call Your Doctor If:  · You have unusual pain at the radial/ulnar (wrist) site.  · You have redness, warmth, swelling, or pain at the radial/ulnar (wrist) site.  · You have drainage (other than a small amount of blood on the dressing).  · `You have chills or a fever > 101.  · Your arm becomes pale or dark, cool, tingly, or numb.  · You develop chest pain, shortness of breath, feel faint or pass out.    · You have any symptoms of a stroke.  Remember BE  FAST  · B-balance. Sudden trouble walking or loss of balance.  · E-eyes.  Sudden changes in how you see or a sudden onset of a very bad headache.   · F-face. Sudden weakness or loss of feeling of the face or facial droop on one side.   · A-arms Sudden weakness or numbness in one arm.  One arm drifts down if they are both held out in front of you.   · S-speech.  Sudden trouble speaking, slurred speech or trouble understanding what are saying.   · T-time  Time to call emergency services.  Write down the symptoms and the time they started.   of loss of feeling in an arm.  This happens suddenly and usually on one side of the body.  · You have heavy bleeding from the site, hold pressure on the site for 20 minutes.  If the bleeding stops, apply a fresh bandage and call your cardiologist.  However, if you continue to have bleeding, call 911.        Make Sure You:   · Understand these instructions.  · Will watch your condition.  · Will get help right away if you are not doing well or get worse.

## 2021-09-29 NOTE — H&P
HOSPITAL FOLLOW UP   Subjective:        Anne Gentile is a 61 y.o. female who here for follow up    CC  STILL SOB  ABNORMAL CARDIAC FUN TEST  HPI  61-year-old female with known history of atypical chest pains, benign essential arterial hypertension and amiodarone therapy still complains of shortness of breath had a stress test which is abnormal with no chest pain     Problems Addressed this Visit        Cardiac and Vasculature    Chest pain    Relevant Orders    Cardiac Catheterization/Vascular Study (Completed)    Abnormal nuclear stress test    Relevant Orders    Cardiac Catheterization/Vascular Study (Completed)      Diagnoses       Codes Comments    Precordial pain     ICD-10-CM: R07.2  ICD-9-CM: 786.51     Abnormal nuclear stress test     ICD-10-CM: R94.39  ICD-9-CM: 794.39         .Interpretation Summary    · Calculated left ventricular EF = 59% Estimated left ventricular EF was in agreement with the calculated left ventricular EF.  · Left ventricular diastolic function was indeterminate.  · The right ventricular cavity is borderline dilated.  · Left atrial volume is mildly increased.  · The right atrial cavity is mildly dilated.         The following portions of the patient's history were reviewed and updated as appropriate: allergies, current medications, past family history, past medical history, past social history, past surgical history and problem list.    Past Medical History:   Diagnosis Date   • Arthritis    • Atrial fibrillation (CMS/HCC)    • CHF (congestive heart failure) (CMS/HCC)    • COPD (chronic obstructive pulmonary disease) (CMS/HCC)    • Coronary artery disease    • COVID-19    • Depression    • Disease of thyroid gland    • Hypertension      reports that she quit smoking about 10 years ago. She has a 30.00 pack-year smoking history. She has never used smokeless tobacco. She reports that she does not drink alcohol and does not use drugs.   Family History   Problem Relation Age of Onset  "  • Cancer Mother    • COPD Father    • Heart disease Father    • Hypertension Father        Review of Systems  Constitutional: No wt loss, fever, fatigue  Gastrointestinal: No nausea, abdominal pain  Behavioral/Psych: No insomnia or anxiety   Cardiovascular shortness of breath no chest pain  Objective:       Physical Exam  /94   Pulse 84   Temp 97 °F (36.1 °C) (Temporal)   Resp 18   Ht 170.2 cm (67\")   Wt (!) 175 kg (386 lb)   SpO2 94%   BMI 60.46 kg/m²   General appearance: No acute changes   Neck: Trachea midline; NECK, supple, no thyromegaly or lymphadenopathy   Lungs: Normal size and shape, normal breath sounds, equal distribution of air, no rales and rhonchi   CV: S1-S2 regular, no murmurs, no rub, no gallop   Abdomen: Soft, non-tender; no masses , no abnormal abdominal sounds   Extremities: No deformity , normal color , no peripheral edema   Skin: Normal temperature, turgor and texture; no rash, ulcers          Procedures      Echocardiogram:      No current facility-administered medications for this encounter.    Current Outpatient Medications:   •  albuterol (PROVENTIL) (2.5 MG/3ML) 0.083% nebulizer solution, Inhale 1 vial by nebulization Every 4 (Four) Hours As Needed., Disp: 180 mL, Rfl: 11  •  aspirin 81 MG chewable tablet, Chew 81 mg Daily., Disp: , Rfl:   •  budesonide-formoterol (SYMBICORT) 160-4.5 MCG/ACT inhaler, Inhale 2 puffs 2 (Two) Times a Day for 30 days., Disp: 20.4 g, Rfl: 0  •  carvedilol (COREG) 25 MG tablet, Take 1 tablet by mouth 2 (Two) Times a Day With Meals., Disp: 180 tablet, Rfl: 1  •  escitalopram (LEXAPRO) 10 MG tablet, TAKE ONE TABLET BY MOUTH DAILY, Disp: , Rfl:   •  furosemide (LASIX) 20 MG tablet, Take 1 tablet by mouth Daily., Disp: 90 tablet, Rfl: 3  •  levothyroxine (SYNTHROID, LEVOTHROID) 175 MCG tablet, Take 1 tablet by mouth daily., Disp: 90 tablet, Rfl: 1  •  losartan (COZAAR) 50 MG tablet, Take 1 tablet by mouth Daily for 30 days., Disp: 30 tablet, Rfl: " 0  •  omeprazole (priLOSEC) 20 MG capsule, Take 1 capsule by mouth Daily., Disp: 90 capsule, Rfl: 0  •  sertraline (ZOLOFT) 100 MG tablet, Take 1 tablet by mouth Daily., Disp: 90 tablet, Rfl: 1  •  furosemide (LASIX) 20 MG tablet, Take 2 tablets by mouth 2 (Two) Times a Day for 30 days., Disp: 120 tablet, Rfl: 0  •  ipratropium-albuterol (DUO-NEB) 0.5-2.5 mg/3 ml nebulizer, Take 3 mL by nebulization Every 4 (Four) Hours for 30 days., Disp: 360 mL, Rfl: 0  •  levothyroxine (SYNTHROID, LEVOTHROID) 175 MCG tablet, Take 1 tablet by mouth Daily for 30 days., Disp: 30 tablet, Rfl: 0  •  nitroglycerin (NITROSTAT) 0.4 MG SL tablet, 1 under the tongue as needed for angina, may repeat q5mins for up three doses, Disp: 100 tablet, Rfl: 11  •  omeprazole (PriLOSEC) 20 MG capsule, Take 40 mg by mouth., Disp: , Rfl:   •  sertraline (ZOLOFT) 100 MG tablet, , Disp: , Rfl:   •  ticagrelor (BRILINTA) 90 MG tablet tablet, Take 1 tablet by mouth 2 (Two) Times a Day., Disp: 60 tablet, Rfl: 11   Assessment:        Patient Active Problem List   Diagnosis   • Malignant hypertension   • Acute rhinosinusitis   • Knee pain   • Chest pain at rest   • Chest pain   • Chronic headache   • Congestion of respiratory tract   • Depression   • Gastroesophageal reflux disease without esophagitis   • Hypertension   • Hypothyroidism   • Headache   • Morbid obesity (CMS/HCC)   • Other specified disorders of nose and paranasal sinuses   • Difficulty sleeping   • Unstable angina pectoris (CMS/HCC)   • Atrial fibrillation (CMS/HCC)   • A-fib (CMS/HCC)   • H/O amiodarone therapy   • Anticoagulated   • Dyspnea on exertion   • Abnormal nuclear stress test               Plan:            ICD-10-CM ICD-9-CM   1. Precordial pain  R07.2 786.51   2. Abnormal nuclear stress test  R94.39 794.39     1. Abnormal nuclear stress test  Procedure, risks and options of cardiac cath explained to pt INCLUDING BUT NOT LIMITED TO MI, STROKE, DEATH, INFECTION HAEMORRHAGE, . Pt  understands well and agrees with no further questions.    - Case Request Cath Lab: Left Heart Cath  - CBC & Differential; Future  - Basic Metabolic Panel  - aPTT; Future  - Protime-INR; Future    2. SOB (shortness of breath)  Multifactorial    3. H/O amiodarone therapy  Anne Gentile IS ON AMIODARONE,   Significant side effects associated with this medication has been explained     Pros and cons of the medications has been discussed, decision has been to continue the medication   6 months to yearly checkup for eye examination, thyroid function test, chest x-ray and liver function test has been advised    Patient understands well      4. Precordial pain  Atypical    5. Essential hypertension  Pressure controlled    6. Anticoagulated  Continue current treatment       Procedure, risks and options of cardiac cath explained to pt INCLUDING BUT NOT LIMITED TO MI, STROKE, DEATH, INFECTION HAEMORRHAGE, . Pt understands well and agrees with no further questions.    COUNSELING:    Anne ReidKendrickeling was given to patient for the following topics: diagnostic results, risk factor reductions, impressions, risks and benefits of treatment options and importance of treatment compliance .       SMOKING COUNSELING:    [unfilled]    Dictated using Dragon dictation

## 2021-10-14 ENCOUNTER — PATIENT OUTREACH (OUTPATIENT)
Dept: CASE MANAGEMENT | Facility: OTHER | Age: 61
End: 2021-10-14

## 2021-10-15 NOTE — OUTREACH NOTE
Ambulatory Case Management Note    I enrolled Anne 7/21/2021 after she reported to the ed with continued SOB, chronic headaches and blurred vision.  She was inpt from 7/13 to 7/16.  She was diagnosed with Covid in January of 2021 she was on a vent and is still suffering from symptoms.   She has hypertension and hypothyroidism.  We reviewed some of her labs today.  Her TSH was 12 but they have increased her thyroid medications.      She has an upcoming appointment with her md and I again explained the importance of calling to have it billed as an annual checkup.  We discussed her looking into an appointment at the [facility].  She states at this time money is tight and she is dealing with difficulties in her personal life.  She says her roommate or partner of over 40 years just left her and moved out.  She is now responsible for all the bills an is concerned about being able to meet these responsibilities.  I have sent her the number for EAP and encouraged her to use this.      I sent the EAP flyer to her personal email at [email or web address]    She is taking her TSH meds as prescribed in the am on an empty stomach.  We will speak again in another month.      Educated on availability of nurseline and its use.  All basic needs are met. No issue with social determinants.  No inabilities to obtain food or medications or transportation to MD appointments. Educated on participating in habits that prevent the spread of COVID virus with home & work hygiene. Patient verbalizes understanding.  Educated patient on benefits of Employee CM program and invited to call with any new needs.     Megan Taylor RN  Ambulatory Case Management    10/15/2021, 08:44 EDT  Megan DAUGHERTYN, RN, [facility]   RN Case Manager  [address] [address]     [phone] cell   [phone] office  [phone] fax  [email or web address]  [facility]

## 2021-10-22 ENCOUNTER — OFFICE VISIT (OUTPATIENT)
Dept: CARDIOLOGY | Facility: CLINIC | Age: 61
End: 2021-10-22

## 2021-10-22 VITALS
HEIGHT: 67 IN | DIASTOLIC BLOOD PRESSURE: 86 MMHG | HEART RATE: 72 BPM | BODY MASS INDEX: 45.99 KG/M2 | WEIGHT: 293 LBS | SYSTOLIC BLOOD PRESSURE: 136 MMHG

## 2021-10-22 DIAGNOSIS — I10 PRIMARY HYPERTENSION: ICD-10-CM

## 2021-10-22 DIAGNOSIS — I25.708 CORONARY ARTERY DISEASE OF BYPASS GRAFT OF NATIVE HEART WITH STABLE ANGINA PECTORIS (HCC): Primary | ICD-10-CM

## 2021-10-22 DIAGNOSIS — I48.20 CHRONIC ATRIAL FIBRILLATION (HCC): ICD-10-CM

## 2021-10-22 DIAGNOSIS — E78.2 MIXED HYPERLIPIDEMIA: ICD-10-CM

## 2021-10-22 DIAGNOSIS — I50.32 CHRONIC DIASTOLIC CHF (CONGESTIVE HEART FAILURE) (HCC): ICD-10-CM

## 2021-10-22 PROCEDURE — 99214 OFFICE O/P EST MOD 30 MIN: CPT

## 2021-10-22 RX ORDER — ROSUVASTATIN CALCIUM 10 MG/1
10 TABLET, COATED ORAL DAILY
Qty: 90 TABLET | Refills: 3 | Status: SHIPPED | OUTPATIENT
Start: 2021-10-22

## 2021-10-22 RX ORDER — CLOPIDOGREL BISULFATE 75 MG/1
75 TABLET ORAL DAILY
Qty: 90 TABLET | Refills: 3 | Status: SHIPPED | OUTPATIENT
Start: 2021-10-22 | End: 2022-10-13

## 2021-10-22 RX ORDER — DABIGATRAN ETEXILATE 150 MG/1
150 CAPSULE ORAL 2 TIMES DAILY
Qty: 180 CAPSULE | Refills: 3 | Status: SHIPPED | OUTPATIENT
Start: 2021-10-22

## 2021-10-22 NOTE — PROGRESS NOTES
Subjective:        Anne Gentile is a 61 y.o. female who here for follow up    No chief complaint on file.    Follow-up for CAD s/p cardiac cath with angioplasty and stent  HPI    This is a 61-year-old female, who is new to me.  She has a history of CAD, CHF, atrial fibrillation, previously anticoagulated on Pradaxa, hypertension COPD.  She was admitted to [facility] on September 24, 2021 for cardiac catheterization with successful angioplasty and stent to the mid RCA.  Left circumflex early atherosclerotic plaque, LAD first large diagonal branch 70% narrowed.    Lipid panel in July 2021 showed , HDL 41, , trig 106 echocardiogram July 2021 EF 59%, indeterminate LV diastolic function, trace to mild MV regurg, mild TV regurg.  Holter monitor in December 2020 showed A. fib with controlled rate.    She follows up for CAD status post stent placement on 9/24/2021.  She denies any chest pain.  She has had some stressful events happening in her life.  She is long-term recovering from Covid, still on oxygen, no change in her chronic shortness of breath.    The following portions of the patient's history were reviewed and updated as appropriate: allergies, current medications, past family history, past medical history, past social history, past surgical history and problem list.    Past Medical History:   Diagnosis Date   • Arthritis    • Atrial fibrillation (CMS/[facility])    • CHF (congestive heart failure) (CMS/[facility])    • COPD (chronic obstructive pulmonary disease) (CMS/[facility])    • Coronary artery disease    • COVID-19    • Depression    • Disease of thyroid gland    • Hypertension          reports that she quit smoking about 10 years ago. She has a 30.00 pack-year smoking history. She has never used smokeless tobacco. She reports that she does not drink alcohol and does not use drugs.     Family History   Problem Relation Age of Onset   • Cancer Mother    • COPD Father    • Heart disease Father     • Hypertension Father        ROS     Review of Systems  Constitutional: No wt loss, fever, fatigue  Gastrointestinal: No nausea, abdominal pain  Behavioral/Psych: No insomnia or anxiety  Cardiovascular no chest pain or new or worsening shortness of breath      Objective:           Vitals and nursing note reviewed.   Constitutional:       Appearance: Well-developed.   HENT:      Head: Normocephalic.      Right Ear: External ear normal.      Left Ear: External ear normal.   Neck:      Vascular: No JVD.   Pulmonary:      Effort: Pulmonary effort is normal. No respiratory distress.      Breath sounds: Normal breath sounds. No stridor. No rales.   Cardiovascular:      Normal rate. Irregularly irregular rhythm.      No gallop.      Comments: Rt radial cath site with no s/s infection or hematoma. No swelling/redness/drainage  Pulses:     Intact distal pulses.   Abdominal:      General: Bowel sounds are normal. There is no distension.      Palpations: Abdomen is soft.      Tenderness: There is no abdominal tenderness. There is no guarding.   Musculoskeletal: Normal range of motion.         General: No tenderness.      Cervical back: Normal range of motion. Skin:     General: Skin is warm.   Neurological:      Mental Status: Alert and oriented to person, place, and time.      Deep Tendon Reflexes: Reflexes are normal and symmetric.   Psychiatric:         Judgment: Judgment normal.         Procedures    HEMODYNAMIC / ANGIOGRAPHIC DATA:    1. Left ventricular end diastolic pressure was 10 mmHg.  2. Left ventriculography revealed an EF around 50%.    3. The left main is normal left main.  4. The left anterior descending artery is .Left anterior descending had a first large diagonal branch at origin 70% narrowed minimum irregularity of the LAD diagonal and  branches otherwise  5. The left circumflex is nondominant with early atherosclerotic plaque.  6. The right coronary artery is .Right coronary artery was dominant  with a proximal 80% reduced to 0% with 4.0/15 Xience stent dilated 4.2PDA branch is approximately 60 to 70% narrowed  7. Successful angioplasty and stent to the mid RCA 80% reduced to 0% with 4.0/15 Xience stent dilated 4.2     EZEKIEL FLOW    PRE....3...       POST....3..     TYPE OF LESION.............     RECOMMENDATIONS:  Post-procedure care will focus on prevention of any ischemic events and congestive complications.  Aggressive risk factor modification will be carried out.     Importance of taking dual antiplatelets for one year  has been explained, risk of stent thrombosis leading to the acute MI, which carries high morbidity and mortality has been explained     Discontinuation or interruptions of these medications should be under the strict guidance of appropriate health professional                 Current Outpatient Medications:   •  albuterol (PROVENTIL) (2.5 MG/3ML) 0.083% nebulizer solution, Inhale 1 vial by nebulization Every 4 (Four) Hours As Needed., Disp: 180 mL, Rfl: 11  •  aspirin 81 MG chewable tablet, Chew 81 mg Daily., Disp: , Rfl:   •  budesonide-formoterol (SYMBICORT) 160-4.5 MCG/ACT inhaler, Inhale 2 puffs 2 (Two) Times a Day for 30 days., Disp: 20.4 g, Rfl: 0  •  carvedilol (COREG) 25 MG tablet, Take 1 tablet by mouth 2 (Two) Times a Day With Meals., Disp: 180 tablet, Rfl: 1  •  escitalopram (LEXAPRO) 10 MG tablet, TAKE ONE TABLET BY MOUTH DAILY, Disp: , Rfl:   •  furosemide (LASIX) 20 MG tablet, Take 2 tablets by mouth 2 (Two) Times a Day for 30 days., Disp: 120 tablet, Rfl: 0  •  furosemide (LASIX) 20 MG tablet, Take 1 tablet by mouth Daily., Disp: 90 tablet, Rfl: 3  •  ipratropium-albuterol (DUO-NEB) 0.5-2.5 mg/3 ml nebulizer, Take 3 mL by nebulization Every 4 (Four) Hours for 30 days., Disp: 360 mL, Rfl: 0  •  levothyroxine (SYNTHROID, LEVOTHROID) 175 MCG tablet, Take 1 tablet by mouth Daily for 30 days., Disp: 30 tablet, Rfl: 0  •  levothyroxine (SYNTHROID, LEVOTHROID) 175 MCG tablet,  Take 1 tablet by mouth daily., Disp: 90 tablet, Rfl: 1  •  losartan (COZAAR) 50 MG tablet, Take 1 tablet by mouth Daily for 30 days., Disp: 30 tablet, Rfl: 0  •  nitroglycerin (NITROSTAT) 0.4 MG SL tablet, 1 under the tongue as needed for angina, may repeat q5mins for up three doses, Disp: 100 tablet, Rfl: 11  •  omeprazole (PriLOSEC) 20 MG capsule, Take 40 mg by mouth., Disp: , Rfl:   •  omeprazole (priLOSEC) 20 MG capsule, Take 1 capsule by mouth Daily., Disp: 90 capsule, Rfl: 0  •  sertraline (ZOLOFT) 100 MG tablet, , Disp: , Rfl:   •  sertraline (ZOLOFT) 100 MG tablet, Take 1 tablet by mouth Daily., Disp: 90 tablet, Rfl: 1  •  ticagrelor (BRILINTA) 90 MG tablet tablet, Take 1 tablet by mouth 2 (Two) Times a Day., Disp: 60 tablet, Rfl: 11     Assessment:        Patient Active Problem List   Diagnosis   • Malignant hypertension   • Acute rhinosinusitis   • Knee pain   • Chest pain at rest   • Chest pain   • Chronic headache   • Congestion of respiratory tract   • Depression   • Gastroesophageal reflux disease without esophagitis   • Hypertension   • Hypothyroidism   • Headache   • Morbid obesity (HCC)   • Other specified disorders of nose and paranasal sinuses   • Difficulty sleeping   • Unstable angina pectoris (HCC)   • Atrial fibrillation (HCC)   • A-fib (HCC)   • H/O amiodarone therapy   • Anticoagulated   • Dyspnea on exertion   • Abnormal nuclear stress test               Plan:   1.  CAD s/p angioplasty and stent: no chest pain. Continue aspirin, beta-blockade. I will switch her to Plavix 75 mg daily since we are restarting Pradaxa. She is not currently on any statin therapy. I will start her on Crestor.    Risk reduction for the coronary artery disease, controlling the blood pressure, blood sugar management, cholesterol management, exercise, stress management, and proper compliance with medications and follow-up has been discussed    Pros and cons of this new medication / change medication has been  explained to  the patient. Possible side effects has been explained. Associated need of the blood  Work has been explained. Need for the compliance of the medication has been explained.    2. Chronic atrial fibrillation: rate controlled. I will restart Pradaxa 150 mg.    Pros and cons as well as indication of the anticoagulation has been explained to the patient in detail. There are no obvious complications at this stage. Risk of  the bleedings has been explained. Need for the regular blood workup and adjust the dose has been explained. Need for proper follow-up on anticoagulation also has been explained.     3.  Chronic diastolic CHF: She denies any new or worsening shortness of breath. She denies any weight gain. No rales on exam. No edema on exam. Continue beta-blockade and ARB.    4.  Hypertension: BP controlled, continue current treatment.     Importance of controlling hypertension and blood pressure  checkup on the regular basis has been explained. Hypertension as a silent killer has been discussed. Risk reduction of the weight and regular exercises to control the hypertension has been explained.    5.  Hyperlipidemia: Previous lipid panel as above shows poor control. She states she has not been on any cholesterol medication previously. I will start Crestor 10 mg, with repeat lipid and CMP in 1 month.    Risk of the hyperlipidemia, importance of the treatment has been explained. Pros and cons of the statins has been explained. Regular blood workup as well as side effects including the liver failure, myelopathy death has been explained.    Pros and cons of this new medication / change medication has been explained to  the patient. Possible side effects has been explained. Associated need of the blood  Work has been explained. Need for the compliance of the medication has been explained.             No diagnosis found.    There are no diagnoses linked to this encounter.    COUNSELING: Chet Rodríguez  Nicoláseling was given to patient for the following topics: diagnostic results, risk factor reductions, impressions, risks and benefits of treatment options and importance of treatment compliance .       SMOKING COUNSELING:denies    I will stop Brilinta and start Plavix 75 mg daily, because I am restarting Pradaxa 150 mg. I will start Crestor 10 mg and recheck lipid and CMP in 1 month.    Sincerely,   SAM Hines  [facility]  10/22/21  09:05 EDT    EMR Dragon/Transcription disclaimer:   Much of this encounter note is an electronic transcription/translation of spoken language to printed text. The electronic translation of spoken language may permit erroneous, or at times, nonsensical words or phrases to be inadvertently transcribed; Although I have reviewed the note for such errors, some may still exist.

## 2021-10-27 ENCOUNTER — TRANSCRIBE ORDERS (OUTPATIENT)
Dept: ADMINISTRATIVE | Facility: HOSPITAL | Age: 61
End: 2021-10-27

## 2021-10-27 ENCOUNTER — LAB (OUTPATIENT)
Dept: LAB | Facility: HOSPITAL | Age: 61
End: 2021-10-27

## 2021-10-27 ENCOUNTER — HOSPITAL ENCOUNTER (OUTPATIENT)
Dept: CT IMAGING | Facility: HOSPITAL | Age: 61
Discharge: HOME | End: 2021-10-27

## 2021-10-27 DIAGNOSIS — I26.99 PULMONARY EMBOLISM AND INFARCTION (HCC): ICD-10-CM

## 2021-10-27 DIAGNOSIS — J45.909 ASTHMA DUE TO SEASONAL ALLERGIES: ICD-10-CM

## 2021-10-27 DIAGNOSIS — I26.99 PULMONARY EMBOLISM AND INFARCTION (HCC): Primary | ICD-10-CM

## 2021-10-27 DIAGNOSIS — J45.909 ASTHMA DUE TO SEASONAL ALLERGIES: Primary | ICD-10-CM

## 2021-10-27 LAB
BASOPHILS # BLD AUTO: 0.04 10*3/MM3 (ref 0–0.2)
BASOPHILS NFR BLD AUTO: 0.7 % (ref 0–1.5)
CREAT BLDA-MCNC: 0.9 MG/DL (ref 0.6–1.3)
DEPRECATED RDW RBC AUTO: 45.7 FL (ref 37–54)
EOSINOPHIL # BLD AUTO: 0.09 10*3/MM3 (ref 0–0.4)
EOSINOPHIL NFR BLD AUTO: 1.5 % (ref 0.3–6.2)
ERYTHROCYTE [DISTWIDTH] IN BLOOD BY AUTOMATED COUNT: 13.6 % (ref 12.3–15.4)
HCT VFR BLD AUTO: 39.8 % (ref 34–46.6)
HGB BLD-MCNC: 12.6 G/DL (ref 12–15.9)
IMM GRANULOCYTES # BLD AUTO: 0.01 10*3/MM3 (ref 0–0.05)
IMM GRANULOCYTES NFR BLD AUTO: 0.2 % (ref 0–0.5)
LYMPHOCYTES # BLD AUTO: 1.25 10*3/MM3 (ref 0.7–3.1)
LYMPHOCYTES NFR BLD AUTO: 20.8 % (ref 19.6–45.3)
MCH RBC QN AUTO: 29.2 PG (ref 26.6–33)
MCHC RBC AUTO-ENTMCNC: 31.7 G/DL (ref 31.5–35.7)
MCV RBC AUTO: 92.3 FL (ref 79–97)
MONOCYTES # BLD AUTO: 0.39 10*3/MM3 (ref 0.1–0.9)
MONOCYTES NFR BLD AUTO: 6.5 % (ref 5–12)
NEUTROPHILS NFR BLD AUTO: 4.22 10*3/MM3 (ref 1.7–7)
NEUTROPHILS NFR BLD AUTO: 70.3 % (ref 42.7–76)
NRBC BLD AUTO-RTO: 0 /100 WBC (ref 0–0.2)
PLATELET # BLD AUTO: 172 10*3/MM3 (ref 140–450)
PMV BLD AUTO: 10.5 FL (ref 6–12)
RBC # BLD AUTO: 4.31 10*6/MM3 (ref 3.77–5.28)
WBC # BLD AUTO: 6 10*3/MM3 (ref 3.4–10.8)

## 2021-10-27 PROCEDURE — 82785 ASSAY OF IGE: CPT

## 2021-10-27 PROCEDURE — 71275 CT ANGIOGRAPHY CHEST: CPT

## 2021-10-27 PROCEDURE — 36415 COLL VENOUS BLD VENIPUNCTURE: CPT

## 2021-10-27 PROCEDURE — 0 IOPAMIDOL PER 1 ML: Performed by: INTERNAL MEDICINE

## 2021-10-27 PROCEDURE — 82565 ASSAY OF CREATININE: CPT

## 2021-10-27 PROCEDURE — 85025 COMPLETE CBC W/AUTO DIFF WBC: CPT

## 2021-10-27 RX ADMIN — IOPAMIDOL 95 ML: 755 INJECTION, SOLUTION INTRAVENOUS at 10:52

## 2021-10-27 NOTE — NURSING NOTE
"Spoke with Dr. Herring, \"no clot noted\". Called results to Dr. Melendez, \"ok to go home\"  Patient informed. Home per self, no distress.  "

## 2021-10-31 LAB — IGE SERPL-ACNC: 41 IU/ML (ref 6–495)

## 2022-01-12 ENCOUNTER — APPOINTMENT (OUTPATIENT)
Dept: CT IMAGING | Facility: HOSPITAL | Age: 62
End: 2022-01-12

## 2022-01-30 ENCOUNTER — HOSPITAL ENCOUNTER (INPATIENT)
Facility: HOSPITAL | Age: 62
LOS: 3 days | Discharge: HOME | End: 2022-02-02
Attending: EMERGENCY MEDICINE | Admitting: HOSPITALIST

## 2022-01-30 DIAGNOSIS — D68.9 COAGULOPATHY: ICD-10-CM

## 2022-01-30 DIAGNOSIS — L03.211 CELLULITIS OF FACE: ICD-10-CM

## 2022-01-30 DIAGNOSIS — B02.30 HERPES ZOSTER OPHTHALMICUS: Primary | ICD-10-CM

## 2022-01-30 LAB
ALBUMIN SERPL-MCNC: 3.7 G/DL (ref 3.5–5.2)
ALBUMIN/GLOB SERPL: 1.2 G/DL
ALP SERPL-CCNC: 107 U/L (ref 39–117)
ALT SERPL W P-5'-P-CCNC: 22 U/L (ref 1–33)
ANION GAP SERPL CALCULATED.3IONS-SCNC: 11.8 MMOL/L (ref 5–15)
AST SERPL-CCNC: 24 U/L (ref 1–32)
BASOPHILS # BLD AUTO: 0.02 10*3/MM3 (ref 0–0.2)
BASOPHILS NFR BLD AUTO: 0.5 % (ref 0–1.5)
BILIRUB SERPL-MCNC: 0.4 MG/DL (ref 0–1.2)
BUN SERPL-MCNC: 13 MG/DL (ref 8–23)
BUN/CREAT SERPL: 15.5 (ref 7–25)
CALCIUM SPEC-SCNC: 9.4 MG/DL (ref 8.6–10.5)
CHLORIDE SERPL-SCNC: 100 MMOL/L (ref 98–107)
CO2 SERPL-SCNC: 29.2 MMOL/L (ref 22–29)
CREAT SERPL-MCNC: 0.84 MG/DL (ref 0.57–1)
D-LACTATE SERPL-SCNC: 2 MMOL/L (ref 0.5–2)
DEPRECATED RDW RBC AUTO: 46.2 FL (ref 37–54)
EOSINOPHIL # BLD AUTO: 0.07 10*3/MM3 (ref 0–0.4)
EOSINOPHIL NFR BLD AUTO: 1.7 % (ref 0.3–6.2)
ERYTHROCYTE [DISTWIDTH] IN BLOOD BY AUTOMATED COUNT: 13 % (ref 12.3–15.4)
GFR SERPL CREATININE-BSD FRML MDRD: 69 ML/MIN/1.73
GLOBULIN UR ELPH-MCNC: 3.1 GM/DL
GLUCOSE SERPL-MCNC: 118 MG/DL (ref 65–99)
HCT VFR BLD AUTO: 39.1 % (ref 34–46.6)
HGB BLD-MCNC: 12.4 G/DL (ref 12–15.9)
IMM GRANULOCYTES # BLD AUTO: 0.01 10*3/MM3 (ref 0–0.05)
IMM GRANULOCYTES NFR BLD AUTO: 0.2 % (ref 0–0.5)
LYMPHOCYTES # BLD AUTO: 1.13 10*3/MM3 (ref 0.7–3.1)
LYMPHOCYTES NFR BLD AUTO: 28.2 % (ref 19.6–45.3)
MCH RBC QN AUTO: 30.7 PG (ref 26.6–33)
MCHC RBC AUTO-ENTMCNC: 31.7 G/DL (ref 31.5–35.7)
MCV RBC AUTO: 96.8 FL (ref 79–97)
MONOCYTES # BLD AUTO: 0.45 10*3/MM3 (ref 0.1–0.9)
MONOCYTES NFR BLD AUTO: 11.2 % (ref 5–12)
NEUTROPHILS NFR BLD AUTO: 2.33 10*3/MM3 (ref 1.7–7)
NEUTROPHILS NFR BLD AUTO: 58.2 % (ref 42.7–76)
NRBC BLD AUTO-RTO: 0 /100 WBC (ref 0–0.2)
PLATELET # BLD AUTO: 104 10*3/MM3 (ref 140–450)
PMV BLD AUTO: 9.9 FL (ref 6–12)
POTASSIUM SERPL-SCNC: 3.8 MMOL/L (ref 3.5–5.2)
PROCALCITONIN SERPL-MCNC: 0.35 NG/ML (ref 0–0.25)
PROT SERPL-MCNC: 6.8 G/DL (ref 6–8.5)
RBC # BLD AUTO: 4.04 10*6/MM3 (ref 3.77–5.28)
SARS-COV-2 RNA PNL SPEC NAA+PROBE: NOT DETECTED
SODIUM SERPL-SCNC: 141 MMOL/L (ref 136–145)
WBC NRBC COR # BLD: 4.01 10*3/MM3 (ref 3.4–10.8)

## 2022-01-30 PROCEDURE — 99284 EMERGENCY DEPT VISIT MOD MDM: CPT

## 2022-01-30 PROCEDURE — 87040 BLOOD CULTURE FOR BACTERIA: CPT | Performed by: EMERGENCY MEDICINE

## 2022-01-30 PROCEDURE — 25010000002 ONDANSETRON PER 1 MG: Performed by: EMERGENCY MEDICINE

## 2022-01-30 PROCEDURE — 85025 COMPLETE CBC W/AUTO DIFF WBC: CPT | Performed by: EMERGENCY MEDICINE

## 2022-01-30 PROCEDURE — 25010000002 ACYCLOVIR PER 5 MG: Performed by: EMERGENCY MEDICINE

## 2022-01-30 PROCEDURE — 84145 PROCALCITONIN (PCT): CPT | Performed by: EMERGENCY MEDICINE

## 2022-01-30 PROCEDURE — 25010000002 FENTANYL CITRATE (PF) 50 MCG/ML SOLUTION: Performed by: EMERGENCY MEDICINE

## 2022-01-30 PROCEDURE — 25010000002 VANCOMYCIN 10 G RECONSTITUTED SOLUTION: Performed by: EMERGENCY MEDICINE

## 2022-01-30 PROCEDURE — 83605 ASSAY OF LACTIC ACID: CPT | Performed by: EMERGENCY MEDICINE

## 2022-01-30 PROCEDURE — 80053 COMPREHEN METABOLIC PANEL: CPT | Performed by: EMERGENCY MEDICINE

## 2022-01-30 PROCEDURE — 36415 COLL VENOUS BLD VENIPUNCTURE: CPT | Performed by: EMERGENCY MEDICINE

## 2022-01-30 PROCEDURE — 87635 SARS-COV-2 COVID-19 AMP PRB: CPT | Performed by: EMERGENCY MEDICINE

## 2022-01-30 PROCEDURE — 99285 EMERGENCY DEPT VISIT HI MDM: CPT

## 2022-01-30 RX ORDER — ASPIRIN 81 MG/1
81 TABLET ORAL DAILY
Status: DISCONTINUED | OUTPATIENT
Start: 2022-01-31 | End: 2022-02-02 | Stop reason: HOSPADM

## 2022-01-30 RX ORDER — NITROGLYCERIN 0.4 MG/1
0.4 TABLET SUBLINGUAL
Status: DISCONTINUED | OUTPATIENT
Start: 2022-01-30 | End: 2022-02-01

## 2022-01-30 RX ORDER — ERYTHROMYCIN 5 MG/G
OINTMENT OPHTHALMIC EVERY 12 HOURS
Status: DISCONTINUED | OUTPATIENT
Start: 2022-01-30 | End: 2022-02-02 | Stop reason: HOSPADM

## 2022-01-30 RX ORDER — LEVOTHYROXINE SODIUM 175 UG/1
175 TABLET ORAL
Status: DISCONTINUED | OUTPATIENT
Start: 2022-01-31 | End: 2022-02-02 | Stop reason: HOSPADM

## 2022-01-30 RX ORDER — ALBUTEROL SULFATE 2.5 MG/3ML
2.5 SOLUTION RESPIRATORY (INHALATION) EVERY 6 HOURS PRN
Status: DISCONTINUED | OUTPATIENT
Start: 2022-01-30 | End: 2022-02-02

## 2022-01-30 RX ORDER — BUDESONIDE AND FORMOTEROL FUMARATE DIHYDRATE 160; 4.5 UG/1; UG/1
2 AEROSOL RESPIRATORY (INHALATION)
Status: DISCONTINUED | OUTPATIENT
Start: 2022-01-31 | End: 2022-02-02 | Stop reason: HOSPADM

## 2022-01-30 RX ORDER — SODIUM CHLORIDE 0.9 % (FLUSH) 0.9 %
10 SYRINGE (ML) INJECTION AS NEEDED
Status: DISCONTINUED | OUTPATIENT
Start: 2022-01-30 | End: 2022-02-02 | Stop reason: HOSPADM

## 2022-01-30 RX ORDER — ONDANSETRON 2 MG/ML
4 INJECTION INTRAMUSCULAR; INTRAVENOUS ONCE
Status: COMPLETED | OUTPATIENT
Start: 2022-01-30 | End: 2022-01-30

## 2022-01-30 RX ORDER — LOSARTAN POTASSIUM 50 MG/1
50 TABLET ORAL
Status: DISCONTINUED | OUTPATIENT
Start: 2022-01-31 | End: 2022-02-02 | Stop reason: HOSPADM

## 2022-01-30 RX ORDER — FENTANYL CITRATE 50 UG/ML
50 INJECTION, SOLUTION INTRAMUSCULAR; INTRAVENOUS ONCE
Status: COMPLETED | OUTPATIENT
Start: 2022-01-30 | End: 2022-01-30

## 2022-01-30 RX ORDER — PANTOPRAZOLE SODIUM 40 MG/1
40 TABLET, DELAYED RELEASE ORAL
Status: DISCONTINUED | OUTPATIENT
Start: 2022-01-31 | End: 2022-02-02 | Stop reason: HOSPADM

## 2022-01-30 RX ORDER — TETRACAINE HYDROCHLORIDE 5 MG/ML
2 SOLUTION OPHTHALMIC ONCE
Status: COMPLETED | OUTPATIENT
Start: 2022-01-30 | End: 2022-01-30

## 2022-01-30 RX ORDER — ESCITALOPRAM OXALATE 20 MG/1
20 TABLET ORAL DAILY
Status: DISCONTINUED | OUTPATIENT
Start: 2022-01-31 | End: 2022-01-31

## 2022-01-30 RX ORDER — BRIMONIDINE TARTRATE 2 MG/ML
1 SOLUTION/ DROPS OPHTHALMIC 3 TIMES DAILY
Status: DISCONTINUED | OUTPATIENT
Start: 2022-01-30 | End: 2022-01-31

## 2022-01-30 RX ORDER — HYDROCODONE BITARTRATE AND ACETAMINOPHEN 5; 325 MG/1; MG/1
1 TABLET ORAL EVERY 4 HOURS PRN
Status: DISCONTINUED | OUTPATIENT
Start: 2022-01-30 | End: 2022-01-31

## 2022-01-30 RX ORDER — CLOPIDOGREL BISULFATE 75 MG/1
75 TABLET ORAL DAILY
Status: DISCONTINUED | OUTPATIENT
Start: 2022-01-31 | End: 2022-02-02 | Stop reason: HOSPADM

## 2022-01-30 RX ORDER — IPRATROPIUM BROMIDE AND ALBUTEROL SULFATE 2.5; .5 MG/3ML; MG/3ML
3 SOLUTION RESPIRATORY (INHALATION) EVERY 4 HOURS PRN
Status: DISCONTINUED | OUTPATIENT
Start: 2022-01-30 | End: 2022-02-02

## 2022-01-30 RX ORDER — PREDNISOLONE ACETATE 10 MG/ML
1 SUSPENSION/ DROPS OPHTHALMIC
Status: DISCONTINUED | OUTPATIENT
Start: 2022-01-30 | End: 2022-02-02 | Stop reason: HOSPADM

## 2022-01-30 RX ORDER — FUROSEMIDE 20 MG/1
20 TABLET ORAL DAILY
Status: DISCONTINUED | OUTPATIENT
Start: 2022-01-31 | End: 2022-02-02 | Stop reason: HOSPADM

## 2022-01-30 RX ORDER — CARVEDILOL 25 MG/1
25 TABLET ORAL DAILY
Status: DISCONTINUED | OUTPATIENT
Start: 2022-01-31 | End: 2022-02-02 | Stop reason: HOSPADM

## 2022-01-30 RX ORDER — ROSUVASTATIN CALCIUM 10 MG/1
10 TABLET, COATED ORAL NIGHTLY
Status: DISCONTINUED | OUTPATIENT
Start: 2022-01-31 | End: 2022-02-02 | Stop reason: HOSPADM

## 2022-01-30 RX ORDER — CYCLOPENTOLATE HYDROCHLORIDE 10 MG/ML
1 SOLUTION/ DROPS OPHTHALMIC 3 TIMES DAILY
Status: DISCONTINUED | OUTPATIENT
Start: 2022-01-30 | End: 2022-02-02 | Stop reason: HOSPADM

## 2022-01-30 RX ADMIN — GLYCERIN 1 DROP: .002; .002; .01 SOLUTION/ DROPS OPHTHALMIC at 19:32

## 2022-01-30 RX ADMIN — PREDNISOLONE ACETATE 1 DROP: 10 SUSPENSION/ DROPS OPHTHALMIC at 21:18

## 2022-01-30 RX ADMIN — CYCLOPENTOLATE HYDROCHLORIDE 1 DROP: 10 SOLUTION/ DROPS OPHTHALMIC at 21:19

## 2022-01-30 RX ADMIN — VANCOMYCIN HYDROCHLORIDE 3000 MG: 10 INJECTION, POWDER, LYOPHILIZED, FOR SOLUTION INTRAVENOUS at 18:43

## 2022-01-30 RX ADMIN — HYDROCODONE BITARTRATE AND ACETAMINOPHEN 1 TABLET: 5; 325 TABLET ORAL at 21:59

## 2022-01-30 RX ADMIN — TETRACAINE HYDROCHLORIDE 2 DROP: 5 SOLUTION OPHTHALMIC at 17:12

## 2022-01-30 RX ADMIN — ACYCLOVIR SODIUM 1000 MG: 1000 INJECTION, SOLUTION INTRAVENOUS at 15:57

## 2022-01-30 RX ADMIN — ONDANSETRON 4 MG: 2 INJECTION INTRAMUSCULAR; INTRAVENOUS at 17:04

## 2022-01-30 RX ADMIN — GLYCERIN 1 DROP: .002; .002; .01 SOLUTION/ DROPS OPHTHALMIC at 22:31

## 2022-01-30 RX ADMIN — FENTANYL CITRATE 50 MCG: 50 INJECTION INTRAMUSCULAR; INTRAVENOUS at 17:04

## 2022-01-31 LAB
ANION GAP SERPL CALCULATED.3IONS-SCNC: 7.5 MMOL/L (ref 5–15)
BASOPHILS # BLD AUTO: 0.02 10*3/MM3 (ref 0–0.2)
BASOPHILS NFR BLD AUTO: 0.5 % (ref 0–1.5)
BUN SERPL-MCNC: 16 MG/DL (ref 8–23)
BUN/CREAT SERPL: 23.9 (ref 7–25)
CALCIUM SPEC-SCNC: 8.8 MG/DL (ref 8.6–10.5)
CHLORIDE SERPL-SCNC: 101 MMOL/L (ref 98–107)
CO2 SERPL-SCNC: 30.5 MMOL/L (ref 22–29)
CREAT SERPL-MCNC: 0.67 MG/DL (ref 0.57–1)
DEPRECATED RDW RBC AUTO: 44.1 FL (ref 37–54)
EOSINOPHIL # BLD AUTO: 0.05 10*3/MM3 (ref 0–0.4)
EOSINOPHIL NFR BLD AUTO: 1.2 % (ref 0.3–6.2)
ERYTHROCYTE [DISTWIDTH] IN BLOOD BY AUTOMATED COUNT: 13 % (ref 12.3–15.4)
GFR SERPL CREATININE-BSD FRML MDRD: 89 ML/MIN/1.73
GLUCOSE SERPL-MCNC: 113 MG/DL (ref 65–99)
HCT VFR BLD AUTO: 36.7 % (ref 34–46.6)
HGB BLD-MCNC: 12 G/DL (ref 12–15.9)
IMM GRANULOCYTES # BLD AUTO: 0.02 10*3/MM3 (ref 0–0.05)
IMM GRANULOCYTES NFR BLD AUTO: 0.5 % (ref 0–0.5)
LYMPHOCYTES # BLD AUTO: 1.23 10*3/MM3 (ref 0.7–3.1)
LYMPHOCYTES NFR BLD AUTO: 30 % (ref 19.6–45.3)
MCH RBC QN AUTO: 30.7 PG (ref 26.6–33)
MCHC RBC AUTO-ENTMCNC: 32.7 G/DL (ref 31.5–35.7)
MCV RBC AUTO: 93.9 FL (ref 79–97)
MONOCYTES # BLD AUTO: 0.54 10*3/MM3 (ref 0.1–0.9)
MONOCYTES NFR BLD AUTO: 13.2 % (ref 5–12)
NEUTROPHILS NFR BLD AUTO: 2.24 10*3/MM3 (ref 1.7–7)
NEUTROPHILS NFR BLD AUTO: 54.6 % (ref 42.7–76)
NRBC BLD AUTO-RTO: 0 /100 WBC (ref 0–0.2)
PLATELET # BLD AUTO: 104 10*3/MM3 (ref 140–450)
PMV BLD AUTO: 9.8 FL (ref 6–12)
POTASSIUM SERPL-SCNC: 4.5 MMOL/L (ref 3.5–5.2)
RBC # BLD AUTO: 3.91 10*6/MM3 (ref 3.77–5.28)
SODIUM SERPL-SCNC: 139 MMOL/L (ref 136–145)
WBC NRBC COR # BLD: 4.1 10*3/MM3 (ref 3.4–10.8)

## 2022-01-31 PROCEDURE — 94761 N-INVAS EAR/PLS OXIMETRY MLT: CPT

## 2022-01-31 PROCEDURE — 85025 COMPLETE CBC W/AUTO DIFF WBC: CPT | Performed by: HOSPITALIST

## 2022-01-31 PROCEDURE — 80048 BASIC METABOLIC PNL TOTAL CA: CPT | Performed by: HOSPITALIST

## 2022-01-31 PROCEDURE — 94640 AIRWAY INHALATION TREATMENT: CPT

## 2022-01-31 PROCEDURE — 94799 UNLISTED PULMONARY SVC/PX: CPT

## 2022-01-31 PROCEDURE — 25010000002 ONDANSETRON PER 1 MG: Performed by: HOSPITALIST

## 2022-01-31 PROCEDURE — 25010000002 ACYCLOVIR PER 5 MG: Performed by: STUDENT IN AN ORGANIZED HEALTH CARE EDUCATION/TRAINING PROGRAM

## 2022-01-31 PROCEDURE — 25010000002 VANCOMYCIN PER 500 MG: Performed by: HOSPITALIST

## 2022-01-31 RX ORDER — HYDROCODONE BITARTRATE AND ACETAMINOPHEN 5; 325 MG/1; MG/1
1 TABLET ORAL EVERY 4 HOURS PRN
Status: DISCONTINUED | OUTPATIENT
Start: 2022-01-31 | End: 2022-01-31

## 2022-01-31 RX ORDER — BRIMONIDINE TARTRATE 2 MG/ML
1 SOLUTION/ DROPS OPHTHALMIC 3 TIMES DAILY
Status: DISCONTINUED | OUTPATIENT
Start: 2022-02-01 | End: 2022-02-02 | Stop reason: HOSPADM

## 2022-01-31 RX ORDER — TETRACAINE HYDROCHLORIDE 5 MG/ML
2 SOLUTION OPHTHALMIC ONCE
Status: COMPLETED | OUTPATIENT
Start: 2022-01-31 | End: 2022-01-31

## 2022-01-31 RX ORDER — ONDANSETRON 2 MG/ML
4 INJECTION INTRAMUSCULAR; INTRAVENOUS EVERY 6 HOURS PRN
Status: DISCONTINUED | OUTPATIENT
Start: 2022-01-31 | End: 2022-02-02

## 2022-01-31 RX ORDER — VANCOMYCIN HYDROCHLORIDE 1 G/200ML
1000 INJECTION, SOLUTION INTRAVENOUS EVERY 12 HOURS
Status: DISCONTINUED | OUTPATIENT
Start: 2022-01-31 | End: 2022-02-02

## 2022-01-31 RX ORDER — FENTANYL CITRATE 50 UG/ML
50 INJECTION, SOLUTION INTRAMUSCULAR; INTRAVENOUS ONCE
Status: DISCONTINUED | OUTPATIENT
Start: 2022-01-31 | End: 2022-01-31

## 2022-01-31 RX ORDER — TIMOLOL MALEATE 5 MG/ML
1 SOLUTION/ DROPS OPHTHALMIC 2 TIMES DAILY
Status: DISCONTINUED | OUTPATIENT
Start: 2022-02-01 | End: 2022-02-02 | Stop reason: HOSPADM

## 2022-01-31 RX ORDER — HYDROCODONE BITARTRATE AND ACETAMINOPHEN 5; 325 MG/1; MG/1
2 TABLET ORAL EVERY 4 HOURS PRN
Status: DISCONTINUED | OUTPATIENT
Start: 2022-01-31 | End: 2022-02-01

## 2022-01-31 RX ORDER — HYDROCODONE BITARTRATE AND ACETAMINOPHEN 5; 325 MG/1; MG/1
1 TABLET ORAL EVERY 4 HOURS PRN
Status: DISCONTINUED | OUTPATIENT
Start: 2022-01-31 | End: 2022-02-02

## 2022-01-31 RX ORDER — ESCITALOPRAM OXALATE 10 MG/1
10 TABLET ORAL DAILY
Status: DISCONTINUED | OUTPATIENT
Start: 2022-01-31 | End: 2022-02-02 | Stop reason: HOSPADM

## 2022-01-31 RX ADMIN — ACYCLOVIR SODIUM 1000 MG: 1000 INJECTION, SOLUTION INTRAVENOUS at 02:18

## 2022-01-31 RX ADMIN — ACYCLOVIR SODIUM 1000 MG: 1000 INJECTION, SOLUTION INTRAVENOUS at 16:19

## 2022-01-31 RX ADMIN — CLOPIDOGREL 75 MG: 75 TABLET, FILM COATED ORAL at 10:15

## 2022-01-31 RX ADMIN — PREDNISOLONE ACETATE 1 DROP: 10 SUSPENSION/ DROPS OPHTHALMIC at 06:36

## 2022-01-31 RX ADMIN — LEVOTHYROXINE SODIUM 175 MCG: 0.17 TABLET ORAL at 06:34

## 2022-01-31 RX ADMIN — HYDROCODONE BITARTRATE AND ACETAMINOPHEN 2 TABLET: 5; 325 TABLET ORAL at 15:44

## 2022-01-31 RX ADMIN — CYCLOPENTOLATE HYDROCHLORIDE 1 DROP: 10 SOLUTION/ DROPS OPHTHALMIC at 21:10

## 2022-01-31 RX ADMIN — CYCLOPENTOLATE HYDROCHLORIDE 1 DROP: 10 SOLUTION/ DROPS OPHTHALMIC at 15:48

## 2022-01-31 RX ADMIN — GLYCERIN 1 DROP: .002; .002; .01 SOLUTION/ DROPS OPHTHALMIC at 19:00

## 2022-01-31 RX ADMIN — PREDNISOLONE ACETATE 1 DROP: 10 SUSPENSION/ DROPS OPHTHALMIC at 21:11

## 2022-01-31 RX ADMIN — PREDNISOLONE ACETATE 1 DROP: 10 SUSPENSION/ DROPS OPHTHALMIC at 10:16

## 2022-01-31 RX ADMIN — ONDANSETRON 4 MG: 2 INJECTION INTRAMUSCULAR; INTRAVENOUS at 13:11

## 2022-01-31 RX ADMIN — GLYCERIN 1 DROP: .002; .002; .01 SOLUTION/ DROPS OPHTHALMIC at 13:12

## 2022-01-31 RX ADMIN — GLYCERIN 1 DROP: .002; .002; .01 SOLUTION/ DROPS OPHTHALMIC at 10:16

## 2022-01-31 RX ADMIN — ESCITALOPRAM 10 MG: 10 TABLET, FILM COATED ORAL at 10:15

## 2022-01-31 RX ADMIN — ACYCLOVIR SODIUM 1000 MG: 1000 INJECTION, SOLUTION INTRAVENOUS at 23:09

## 2022-01-31 RX ADMIN — VANCOMYCIN HYDROCHLORIDE 1000 MG: 1 INJECTION, SOLUTION INTRAVENOUS at 14:46

## 2022-01-31 RX ADMIN — PANTOPRAZOLE SODIUM 40 MG: 40 TABLET, DELAYED RELEASE ORAL at 06:35

## 2022-01-31 RX ADMIN — BRIMONIDINE TARTRATE 1 DROP: 2 SOLUTION OPHTHALMIC at 15:48

## 2022-01-31 RX ADMIN — CARVEDILOL 25 MG: 25 TABLET, FILM COATED ORAL at 10:15

## 2022-01-31 RX ADMIN — HYDROCODONE BITARTRATE AND ACETAMINOPHEN 2 TABLET: 5; 325 TABLET ORAL at 19:52

## 2022-01-31 RX ADMIN — FUROSEMIDE 20 MG: 20 TABLET ORAL at 10:15

## 2022-01-31 RX ADMIN — BRIMONIDINE TARTRATE 1 DROP: 2 SOLUTION OPHTHALMIC at 10:17

## 2022-01-31 RX ADMIN — LOSARTAN POTASSIUM 50 MG: 50 TABLET ORAL at 10:15

## 2022-01-31 RX ADMIN — PREDNISOLONE ACETATE 1 DROP: 10 SUSPENSION/ DROPS OPHTHALMIC at 14:47

## 2022-01-31 RX ADMIN — HYDROCODONE BITARTRATE AND ACETAMINOPHEN 2 TABLET: 5; 325 TABLET ORAL at 06:34

## 2022-01-31 RX ADMIN — GLYCERIN 1 DROP: .002; .002; .01 SOLUTION/ DROPS OPHTHALMIC at 15:48

## 2022-01-31 RX ADMIN — GLYCERIN 1 DROP: .002; .002; .01 SOLUTION/ DROPS OPHTHALMIC at 23:09

## 2022-01-31 RX ADMIN — HYDROCODONE BITARTRATE AND ACETAMINOPHEN 2 TABLET: 5; 325 TABLET ORAL at 11:26

## 2022-01-31 RX ADMIN — CYCLOPENTOLATE HYDROCHLORIDE 1 DROP: 10 SOLUTION/ DROPS OPHTHALMIC at 10:16

## 2022-01-31 RX ADMIN — ERYTHROMYCIN: 5 OINTMENT OPHTHALMIC at 21:10

## 2022-01-31 RX ADMIN — ROSUVASTATIN CALCIUM 10 MG: 10 TABLET, FILM COATED ORAL at 21:09

## 2022-01-31 RX ADMIN — BUDESONIDE AND FORMOTEROL FUMARATE DIHYDRATE 2 PUFF: 160; 4.5 AEROSOL RESPIRATORY (INHALATION) at 07:26

## 2022-01-31 RX ADMIN — BRIMONIDINE TARTRATE 1 DROP: 2 SOLUTION OPHTHALMIC at 21:05

## 2022-01-31 RX ADMIN — BUDESONIDE AND FORMOTEROL FUMARATE DIHYDRATE 2 PUFF: 160; 4.5 AEROSOL RESPIRATORY (INHALATION) at 20:00

## 2022-01-31 RX ADMIN — ACYCLOVIR SODIUM 1000 MG: 1000 INJECTION, SOLUTION INTRAVENOUS at 10:14

## 2022-01-31 RX ADMIN — HYDROCODONE BITARTRATE AND ACETAMINOPHEN 1 TABLET: 5; 325 TABLET ORAL at 02:42

## 2022-01-31 RX ADMIN — GLYCERIN 1 DROP: .002; .002; .01 SOLUTION/ DROPS OPHTHALMIC at 06:35

## 2022-01-31 RX ADMIN — ASPIRIN 81 MG: 81 TABLET, COATED ORAL at 11:27

## 2022-01-31 RX ADMIN — TETRACAINE HYDROCHLORIDE 2 DROP: 5 SOLUTION OPHTHALMIC at 14:51

## 2022-01-31 RX ADMIN — GLYCERIN 1 DROP: .002; .002; .01 SOLUTION/ DROPS OPHTHALMIC at 21:04

## 2022-01-31 RX ADMIN — HYDROCODONE BITARTRATE AND ACETAMINOPHEN 1 TABLET: 5; 325 TABLET ORAL at 02:18

## 2022-01-31 RX ADMIN — ERYTHROMYCIN: 5 OINTMENT OPHTHALMIC at 10:18

## 2022-01-31 RX ADMIN — PREDNISOLONE ACETATE 1 DROP: 10 SUSPENSION/ DROPS OPHTHALMIC at 19:00

## 2022-02-01 LAB
ANION GAP SERPL CALCULATED.3IONS-SCNC: 7 MMOL/L (ref 5–15)
BASOPHILS # BLD AUTO: 0.03 10*3/MM3 (ref 0–0.2)
BASOPHILS NFR BLD AUTO: 0.6 % (ref 0–1.5)
BUN SERPL-MCNC: 14 MG/DL (ref 8–23)
BUN/CREAT SERPL: 21.9 (ref 7–25)
CALCIUM SPEC-SCNC: 8.8 MG/DL (ref 8.6–10.5)
CHLORIDE SERPL-SCNC: 99 MMOL/L (ref 98–107)
CO2 SERPL-SCNC: 29 MMOL/L (ref 22–29)
CREAT SERPL-MCNC: 0.64 MG/DL (ref 0.57–1)
CRP SERPL-MCNC: 0.56 MG/DL (ref 0–0.5)
DEPRECATED RDW RBC AUTO: 46.7 FL (ref 37–54)
EOSINOPHIL # BLD AUTO: 0.06 10*3/MM3 (ref 0–0.4)
EOSINOPHIL NFR BLD AUTO: 1.2 % (ref 0.3–6.2)
ERYTHROCYTE [DISTWIDTH] IN BLOOD BY AUTOMATED COUNT: 13 % (ref 12.3–15.4)
GFR SERPL CREATININE-BSD FRML MDRD: 94 ML/MIN/1.73
GLUCOSE SERPL-MCNC: 118 MG/DL (ref 65–99)
HCT VFR BLD AUTO: 37.9 % (ref 34–46.6)
HGB BLD-MCNC: 11.7 G/DL (ref 12–15.9)
IMM GRANULOCYTES # BLD AUTO: 0.03 10*3/MM3 (ref 0–0.05)
IMM GRANULOCYTES NFR BLD AUTO: 0.6 % (ref 0–0.5)
LYMPHOCYTES # BLD AUTO: 1.36 10*3/MM3 (ref 0.7–3.1)
LYMPHOCYTES NFR BLD AUTO: 28.1 % (ref 19.6–45.3)
MCH RBC QN AUTO: 29.8 PG (ref 26.6–33)
MCHC RBC AUTO-ENTMCNC: 30.9 G/DL (ref 31.5–35.7)
MCV RBC AUTO: 96.4 FL (ref 79–97)
MONOCYTES # BLD AUTO: 0.48 10*3/MM3 (ref 0.1–0.9)
MONOCYTES NFR BLD AUTO: 9.9 % (ref 5–12)
NEUTROPHILS NFR BLD AUTO: 2.88 10*3/MM3 (ref 1.7–7)
NEUTROPHILS NFR BLD AUTO: 59.6 % (ref 42.7–76)
NRBC BLD AUTO-RTO: 0 /100 WBC (ref 0–0.2)
PLATELET # BLD AUTO: 104 10*3/MM3 (ref 140–450)
PMV BLD AUTO: 9.9 FL (ref 6–12)
POTASSIUM SERPL-SCNC: 4.6 MMOL/L (ref 3.5–5.2)
RBC # BLD AUTO: 3.93 10*6/MM3 (ref 3.77–5.28)
SODIUM SERPL-SCNC: 135 MMOL/L (ref 136–145)
VANCOMYCIN TROUGH SERPL-MCNC: 15.6 MCG/ML (ref 5–20)
WBC NRBC COR # BLD: 4.84 10*3/MM3 (ref 3.4–10.8)

## 2022-02-01 PROCEDURE — 25010000002 VANCOMYCIN PER 500 MG: Performed by: HOSPITALIST

## 2022-02-01 PROCEDURE — 80048 BASIC METABOLIC PNL TOTAL CA: CPT | Performed by: HOSPITALIST

## 2022-02-01 PROCEDURE — 85025 COMPLETE CBC W/AUTO DIFF WBC: CPT | Performed by: HOSPITALIST

## 2022-02-01 PROCEDURE — 25010000002 ACYCLOVIR PER 5 MG: Performed by: STUDENT IN AN ORGANIZED HEALTH CARE EDUCATION/TRAINING PROGRAM

## 2022-02-01 PROCEDURE — 80202 ASSAY OF VANCOMYCIN: CPT | Performed by: HOSPITALIST

## 2022-02-01 PROCEDURE — 86140 C-REACTIVE PROTEIN: CPT | Performed by: HOSPITALIST

## 2022-02-01 PROCEDURE — 94761 N-INVAS EAR/PLS OXIMETRY MLT: CPT

## 2022-02-01 PROCEDURE — 94799 UNLISTED PULMONARY SVC/PX: CPT

## 2022-02-01 RX ORDER — HYDROCODONE BITARTRATE AND ACETAMINOPHEN 5; 325 MG/1; MG/1
2 TABLET ORAL ONCE AS NEEDED
Status: COMPLETED | OUTPATIENT
Start: 2022-02-01 | End: 2022-02-01

## 2022-02-01 RX ADMIN — GLYCERIN 1 DROP: .002; .002; .01 SOLUTION/ DROPS OPHTHALMIC at 11:48

## 2022-02-01 RX ADMIN — PREDNISOLONE ACETATE 1 DROP: 10 SUSPENSION/ DROPS OPHTHALMIC at 14:57

## 2022-02-01 RX ADMIN — ERYTHROMYCIN: 5 OINTMENT OPHTHALMIC at 21:59

## 2022-02-01 RX ADMIN — CLOPIDOGREL 75 MG: 75 TABLET, FILM COATED ORAL at 08:44

## 2022-02-01 RX ADMIN — GLYCERIN 1 DROP: .002; .002; .01 SOLUTION/ DROPS OPHTHALMIC at 06:04

## 2022-02-01 RX ADMIN — HYDROCODONE BITARTRATE AND ACETAMINOPHEN 2 TABLET: 5; 325 TABLET ORAL at 00:30

## 2022-02-01 RX ADMIN — LEVOTHYROXINE SODIUM 175 MCG: 0.17 TABLET ORAL at 06:04

## 2022-02-01 RX ADMIN — HYDROCODONE BITARTRATE AND ACETAMINOPHEN 2 TABLET: 5; 325 TABLET ORAL at 12:50

## 2022-02-01 RX ADMIN — HYDROCODONE BITARTRATE AND ACETAMINOPHEN 1 TABLET: 5; 325 TABLET ORAL at 18:13

## 2022-02-01 RX ADMIN — ESCITALOPRAM 10 MG: 10 TABLET, FILM COATED ORAL at 08:44

## 2022-02-01 RX ADMIN — PREDNISOLONE ACETATE 1 DROP: 10 SUSPENSION/ DROPS OPHTHALMIC at 18:14

## 2022-02-01 RX ADMIN — TIMOLOL MALEATE 1 DROP: 5 SOLUTION/ DROPS OPHTHALMIC at 21:58

## 2022-02-01 RX ADMIN — FUROSEMIDE 20 MG: 20 TABLET ORAL at 08:51

## 2022-02-01 RX ADMIN — CYCLOPENTOLATE HYDROCHLORIDE 1 DROP: 10 SOLUTION/ DROPS OPHTHALMIC at 21:59

## 2022-02-01 RX ADMIN — CYCLOPENTOLATE HYDROCHLORIDE 1 DROP: 10 SOLUTION/ DROPS OPHTHALMIC at 18:14

## 2022-02-01 RX ADMIN — ASPIRIN 81 MG: 81 TABLET, COATED ORAL at 08:44

## 2022-02-01 RX ADMIN — ROSUVASTATIN CALCIUM 10 MG: 10 TABLET, FILM COATED ORAL at 21:57

## 2022-02-01 RX ADMIN — TIMOLOL MALEATE 1 DROP: 5 SOLUTION/ DROPS OPHTHALMIC at 08:45

## 2022-02-01 RX ADMIN — ACYCLOVIR SODIUM 1000 MG: 1000 INJECTION, SOLUTION INTRAVENOUS at 08:43

## 2022-02-01 RX ADMIN — CYCLOPENTOLATE HYDROCHLORIDE 1 DROP: 10 SOLUTION/ DROPS OPHTHALMIC at 08:44

## 2022-02-01 RX ADMIN — BRIMONIDINE TARTRATE 1 DROP: 2 SOLUTION OPHTHALMIC at 21:58

## 2022-02-01 RX ADMIN — BUDESONIDE AND FORMOTEROL FUMARATE DIHYDRATE 2 PUFF: 160; 4.5 AEROSOL RESPIRATORY (INHALATION) at 08:23

## 2022-02-01 RX ADMIN — GLYCERIN 1 DROP: .002; .002; .01 SOLUTION/ DROPS OPHTHALMIC at 08:45

## 2022-02-01 RX ADMIN — BRIMONIDINE TARTRATE 1 DROP: 2 SOLUTION OPHTHALMIC at 18:14

## 2022-02-01 RX ADMIN — ACYCLOVIR SODIUM 1000 MG: 1000 INJECTION, SOLUTION INTRAVENOUS at 22:51

## 2022-02-01 RX ADMIN — PREDNISOLONE ACETATE 1 DROP: 10 SUSPENSION/ DROPS OPHTHALMIC at 06:04

## 2022-02-01 RX ADMIN — VANCOMYCIN HYDROCHLORIDE 1000 MG: 1 INJECTION, SOLUTION INTRAVENOUS at 11:52

## 2022-02-01 RX ADMIN — HYDROCODONE BITARTRATE AND ACETAMINOPHEN 2 TABLET: 5; 325 TABLET ORAL at 21:57

## 2022-02-01 RX ADMIN — LOSARTAN POTASSIUM 50 MG: 50 TABLET ORAL at 08:51

## 2022-02-01 RX ADMIN — ERYTHROMYCIN: 5 OINTMENT OPHTHALMIC at 08:45

## 2022-02-01 RX ADMIN — VANCOMYCIN HYDROCHLORIDE 1000 MG: 1 INJECTION, SOLUTION INTRAVENOUS at 00:23

## 2022-02-01 RX ADMIN — PANTOPRAZOLE SODIUM 40 MG: 40 TABLET, DELAYED RELEASE ORAL at 06:04

## 2022-02-01 RX ADMIN — GLYCERIN 1 DROP: .002; .002; .01 SOLUTION/ DROPS OPHTHALMIC at 18:14

## 2022-02-01 RX ADMIN — PREDNISOLONE ACETATE 1 DROP: 10 SUSPENSION/ DROPS OPHTHALMIC at 21:58

## 2022-02-01 RX ADMIN — PREDNISOLONE ACETATE 1 DROP: 10 SUSPENSION/ DROPS OPHTHALMIC at 11:47

## 2022-02-01 RX ADMIN — BUDESONIDE AND FORMOTEROL FUMARATE DIHYDRATE 2 PUFF: 160; 4.5 AEROSOL RESPIRATORY (INHALATION) at 19:29

## 2022-02-01 RX ADMIN — CARVEDILOL 25 MG: 25 TABLET, FILM COATED ORAL at 08:51

## 2022-02-01 RX ADMIN — GLYCERIN 1 DROP: .002; .002; .01 SOLUTION/ DROPS OPHTHALMIC at 14:57

## 2022-02-01 RX ADMIN — TIMOLOL MALEATE 1 DROP: 5 SOLUTION/ DROPS OPHTHALMIC at 00:24

## 2022-02-01 RX ADMIN — GLYCERIN 1 DROP: .002; .002; .01 SOLUTION/ DROPS OPHTHALMIC at 21:57

## 2022-02-01 RX ADMIN — ACYCLOVIR SODIUM 1000 MG: 1000 INJECTION, SOLUTION INTRAVENOUS at 14:57

## 2022-02-01 RX ADMIN — HYDROCODONE BITARTRATE AND ACETAMINOPHEN 2 TABLET: 5; 325 TABLET ORAL at 08:50

## 2022-02-01 RX ADMIN — BRIMONIDINE TARTRATE 1 DROP: 2 SOLUTION OPHTHALMIC at 08:45

## 2022-02-01 NOTE — PROGRESS NOTES
"  Infectious Diseases Progress Note    Emerson Canseco MD     [facility]  [place]: 2 days  Patient Identification:  Name: Anne Gentile  Age: 61 y.o.  Sex: female  :  1960  MRN: [id number]         Primary Care Physician: Radha Stewart MD            Subjective: Decreased pain and discomfort on the left side of the face still have sensitivity to light.  Interval History: See consultation note.    Objective:    Scheduled Meds:acyclovir, 10 mg/kg (Adjusted), Intravenous, Q8H  aspirin, 81 mg, Oral, Daily  brimonidine, 1 drop, Left Eye, TID   And  timolol, 1 drop, Left Eye, BID  budesonide-formoterol, 2 puff, Inhalation, BID - RT  carvedilol, 25 mg, Oral, Daily  clopidogrel, 75 mg, Oral, Daily  cyclopentolate, 1 drop, Left Eye, TID  erythromycin, , Left Eye, Q12H  escitalopram, 10 mg, Oral, Daily  furosemide, 20 mg, Oral, Daily  Glycerin-Hypromellose-, 1 drop, Left Eye, Q2H While Awake  levothyroxine, 175 mcg, Oral, Q AM  losartan, 50 mg, Oral, Q24H  pantoprazole, 40 mg, Oral, Q AM  prednisoLONE acetate, 1 drop, Left Eye, Q4H While Awake  rosuvastatin, 10 mg, Oral, Nightly  vancomycin, 1,000 mg, Intravenous, Q12H      Continuous Infusions:Pharmacy to Dose acyclovir (ZOVIRAX),   Pharmacy to dose vancomycin,         Vital signs in last 24 hours:  Temp:  [98.2 °F (36.8 °C)-98.3 °F (36.8 °C)] 98.3 °F (36.8 °C)  Heart Rate:  [73-91] 75  Resp:  [16-20] 16  BP: (104-126)/(51-69) 126/69    Intake/Output:  No intake or output data in the 24 hours ending 22 1226    Exam:  /69   Pulse 75   Temp 98.3 °F (36.8 °C) (Oral)   Resp 16   Ht 170.2 cm (67\")   Wt (!) 157 kg (347 lb)   SpO2 92%   BMI 54.35 kg/m²   Patient is examined using the personal protective equipment as per guidelines from infection control for this particular patient as enacted.  Hand washing was performed before and after patient interaction.  General Appearance:    Alert, cooperative, no distress, AAOx3                "           Head:    Normocephalic, without obvious abnormality, atraumatic left forehead and frontal area vesicular rash noted.                           Eyes:  Left periorbital vesicular rash with no surrounding cellulitis.                         Throat:   Lips, tongue, gums normal; oral mucosa pink and moist                           Neck:   Supple, symmetrical, trachea midline, no JVD                         Lungs:    Clear to auscultation bilaterally, respirations unlabored                 Chest Wall:    No tenderness or deformity                          Heart:  S1-S2 regular                  Abdomen:   Obese soft nontender                 extremities:   Extremities normal, atraumatic, no cyanosis or edema                        Pulses:   Pulses palpable in all extremities                            Skin:   Skin is warm and dry,  no rashes or palpable lesions                  Neurologic: Photophobia involving the left eye otherwise grossly nonfocal examination.       Data Review:    I reviewed the patient's new clinical results.  Results from last 7 days   Lab Units 02/01/22  0531 01/31/22  0723 01/30/22  1556   WBC 10*3/mm3 4.84 4.10 4.01   HEMOGLOBIN g/dL 11.7* 12.0 12.4   PLATELETS 10*3/mm3 104* 104* 104*     Results from last 7 days   Lab Units 02/01/22  0531 01/31/22  0723 01/30/22  1556   SODIUM mmol/L 135* 139 141   POTASSIUM mmol/L 4.6 4.5 3.8   CHLORIDE mmol/L 99 101 100   CO2 mmol/L 29.0 30.5* 29.2*   BUN mg/dL 14 16 13   CREATININE mg/dL 0.64 0.67 0.84   CALCIUM mg/dL 8.8 8.8 9.4   GLUCOSE mg/dL 118* 113* 118*       Microbiology Results (last 10 days)     Procedure Component Value - Date/Time    Blood Culture - Blood, Hand, Left [[id number]]  (Normal) Collected: 01/30/22 1902    Lab Status: Preliminary result Specimen: Blood from Hand, Left Updated: 01/31/22 1915     Blood Culture No growth at 24 hours    COVID PRE-OP / PRE-PROCEDURE SCREENING ORDER (NO ISOLATION) - Swab, Nasopharynx [[id number]]   (Normal) Collected: 01/30/22 1604    Lab Status: Final result Specimen: Swab from Nasopharynx Updated: 01/30/22 1634    Narrative:      The following orders were created for panel order COVID PRE-OP / PRE-PROCEDURE SCREENING ORDER (NO ISOLATION) - Swab, Nasopharynx.  Procedure                               Abnormality         Status                     ---------                               -----------         ------                     COVID-19,[facility] IN-HOUSE...[[id number]]  Normal              Final result                 Please view results for these tests on the individual orders.    COVID-19,[facility] IN-HOUSE CEPHEID/MARILIN NP SWAB IN TRANSPORT MEDIA 8-12 HR TAT - Swab, Nasopharynx [[id number]]  (Normal) Collected: 01/30/22 1604    Lab Status: Final result Specimen: Swab from Nasopharynx Updated: 01/30/22 1634     COVID19 Not Detected    Narrative:      Fact sheet for providers: https://www.fda.gov/media/499680/download    Fact sheet for patients: https://www.fda.gov/media/437483/download    Test performed by PCR.    Blood Culture - Blood, Arm, Left [[id number]]  (Normal) Collected: 01/30/22 1556    Lab Status: Preliminary result Specimen: Blood from Arm, Left Updated: 01/31/22 1616     Blood Culture No growth at 24 hours          Assessment:    Herpes zoster ophthalmicus  1-left V1 herpes zoster with ophthalmic of and possible keratitis and uveitis  2-morbid obesity  3-history of hypothyroidism  4-COPD  5-other diagnosis per primary team.        Recommendations/Discussions:  See my recommendations and discussion on 1/31/2022.  Discussed with patient's caring nurse patient and with Dr. Nolan that from infectious disease standpoint patient can be discharged on oral Valtrex 1 g every 8 hours for total of 5 days counting acyclovir that she has received here, Once considered stable from medical standpoint.  Emerson Canseco MD  2/1/2022  12:26 EST    Much of this encounter note is an electronic transcription/translation of  spoken language to printed text. The electronic translation of spoken language may permit erroneous, or at times, nonsensical words or phrases to be inadvertently transcribed; Although I have reviewed the note for such errors, some may still exist

## 2022-02-01 NOTE — CASE MANAGEMENT/SOCIAL WORK
Discharge Planning Assessment  [facility]     Patient Name: Anne Gentile  MRN: [id number]  Today's Date: 2/1/2022    Admit Date: 1/30/2022     Discharge Needs Assessment     Row Name 02/01/22 1612       Living Environment    Lives With alone    Current Living Arrangements home/apartment/condo    Primary Care Provided by self    Family Caregiver if Needed child(jamison), adult    Able to Return to Prior Arrangements yes       Transition Planning    Patient/Family Anticipates Transition to home with help/services    Patient/Family Anticipated Services at Transition home health care    Transportation Anticipated family or friend will provide       Discharge Needs Assessment    Equipment Currently Used at Home walker, rolling; wheelchair; cane, straight; respiratory supplies; oxygen; nebulizer               Discharge Plan     Row Name 02/01/22 1618       Plan    Patient/Family in Agreement with Plan yes    Plan Comments CCP spoke with pt @ bedside, confirmed face sheet and primary pharmacy as [facility].  Pt states she lives alone and is IDAL's.  She has walker ,w/c and cane at home.  Pt gets her home o2 from [address] and has nebulizer at home.  Discussed dc options.  Plans are home. Pt agreeable to have hh follow, referral to  HH. CCP will follow. Kayleigh Miguel RN    Row Name 02/01/22 1611       Plan    Plan Plans are home with  HH.              Continued Care and Services - Admitted Since 1/30/2022     Home Medical Care     Service Provider Request Status Selected Services Address Phone Fax Patient Preferred    [facility]   [facility] [address] [id number] [phone] --              Expected Discharge Date and Time     Expected Discharge Date Expected Discharge Time    Feb 2, 2022          Demographic Summary    No documentation.                Functional Status    No documentation.                Psychosocial    No  documentation.                Abuse/Neglect    No documentation.                Legal     Row Name 02/01/22 1612       Financial/Legal    Who Manages Finances if Patient Unable no living will               Substance Abuse    No documentation.                Patient Forms    No documentation.                   Kayleigh Miguel RN

## 2022-02-01 NOTE — PROGRESS NOTES
"Daily progress note    Chief complaint  Doing better  No new complaints  Denies fever cough shortness of breath    History of present illness  61-year-old white female with history of chronic hypoxic respiratory failure paroxysmal atrial fibrillation diastolic congestive heart failure hypertension hypothyroidism obstructive sleep apnea who is well-known to our service presented to [facility] emergency room with left side of the face redness swelling pain for last 1 week which is getting worst.  Patient does have history of herpes zoster in the past long time ago.  Patient denies any fever chills cough chest pain abdominal pain nausea vomiting diarrhea.  Patient work-up in ER revealed recurrent herpes zoster with superadded bacterial cellulitis admit for management.     REVIEW OF SYSTEMS  All systems reviewed and negative except for those discussed in HPI.      PHYSICAL EXAM  Blood pressure 126/69, pulse 75, temperature 98.3 °F (36.8 °C), temperature source Oral, resp. rate 16, height 170.2 cm (67\"), weight (!) 157 kg (347 lb), SpO2 92 %.    GENERAL:  Awake and alert no acute distress  HEENT:  Unremarkable except left-sided facial redness swelling tenderness with left eye closed and no drainage noted with vision intact  NECK: Supple, no meningismus  CV: regular rhythm, regular rate with intact distal pulses.  RESPIRATORY: normal effort and no respiratory distress.  Clear to auscultation bilaterally  ABDOMEN: soft and nontender.  Morbidly obese.  Bowel sounds positive  MUSCULOSKELETAL: no deformity.  Intact distal pulses  NEURO: alert and appropriate, moves all extremities, follows commands.  No focal motor or sensory changes.  SKIN: warm, dry     LAB RESULTS  Lab Results (last 24 hours)     Procedure Component Value Units Date/Time    Vancomycin, Trough [[id number]]  (Normal) Collected: 02/01/22 1119    Specimen: Blood Updated: 02/01/22 1227     Vancomycin Trough 15.60 mcg/mL     Narrative:      Therapeutic " Ranges for Vancomycin    Vancomycin Random   5.0-40.0 mcg/mL  Vancomycin Trough   5.0-20.0 mcg/mL  Vancomycin Peak     20.0-40.0 mcg/mL    Basic Metabolic Panel [[id number]]  (Abnormal) Collected: 02/01/22 0531    Specimen: Blood Updated: 02/01/22 0650     Glucose 118 mg/dL      BUN 14 mg/dL      Creatinine 0.64 mg/dL      Sodium 135 mmol/L      Potassium 4.6 mmol/L      Comment: Slight hemolysis detected by analyzer. Results may be affected.        Chloride 99 mmol/L      CO2 29.0 mmol/L      Calcium 8.8 mg/dL      eGFR Non African Amer 94 mL/min/1.73      BUN/Creatinine Ratio 21.9     Anion Gap 7.0 mmol/L     Narrative:      GFR Normal >60  Chronic Kidney Disease <60  Kidney Failure <15      C-reactive Protein [[id number]]  (Abnormal) Collected: 02/01/22 0531    Specimen: Blood Updated: 02/01/22 0650     C-Reactive Protein 0.56 mg/dL     CBC & Differential [[id number]]  (Abnormal) Collected: 02/01/22 0531    Specimen: Blood Updated: 02/01/22 0618    Narrative:      The following orders were created for panel order CBC & Differential.  Procedure                               Abnormality         Status                     ---------                               -----------         ------                     CBC Auto Differential[[id number]]        Abnormal            Final result                 Please view results for these tests on the individual orders.    CBC Auto Differential [[id number]]  (Abnormal) Collected: 02/01/22 0531    Specimen: Blood Updated: 02/01/22 0618     WBC 4.84 10*3/mm3      RBC 3.93 10*6/mm3      Hemoglobin 11.7 g/dL      Hematocrit 37.9 %      MCV 96.4 fL      MCH 29.8 pg      MCHC 30.9 g/dL      RDW 13.0 %      RDW-SD 46.7 fl      MPV 9.9 fL      Platelets 104 10*3/mm3      Neutrophil % 59.6 %      Lymphocyte % 28.1 %      Monocyte % 9.9 %      Eosinophil % 1.2 %      Basophil % 0.6 %      Immature Grans % 0.6 %      Neutrophils, Absolute 2.88 10*3/mm3      Lymphocytes, Absolute 1.36 10*3/mm3       Monocytes, Absolute 0.48 10*3/mm3      Eosinophils, Absolute 0.06 10*3/mm3      Basophils, Absolute 0.03 10*3/mm3      Immature Grans, Absolute 0.03 10*3/mm3      nRBC 0.0 /100 WBC     Blood Culture - Blood, Hand, Left [[id number]]  (Normal) Collected: 01/30/22 1902    Specimen: Blood from Hand, Left Updated: 01/31/22 1915     Blood Culture No growth at 24 hours    Blood Culture - Blood, Arm, Left [[id number]]  (Normal) Collected: 01/30/22 1556    Specimen: Blood from Arm, Left Updated: 01/31/22 1616     Blood Culture No growth at 24 hours        Imaging Results (Last 24 Hours)     ** No results found for the last 24 hours. **          Current Facility-Administered Medications:   •  acyclovir (ZOVIRAX) 1,000 mg in sodium chloride 0.9 % 250 mL IVPB, 10 mg/kg (Adjusted), Intravenous, Q8H, Sonali Guzman MD, 1,000 mg at 02/01/22 0843  •  albuterol (PROVENTIL) nebulizer solution 0.083% 2.5 mg/3mL, 2.5 mg, Nebulization, Q6H PRN, Flip Nolan MD  •  aspirin EC tablet 81 mg, 81 mg, Oral, Daily, Flip Nolan MD, 81 mg at 02/01/22 0844  •  brimonidine (ALPHAGAN) 0.2 % ophthalmic solution 1 drop, 1 drop, Left Eye, TID, 1 drop at 02/01/22 0845 **AND** timolol (TIMOPTIC) 0.5 % ophthalmic solution 1 drop, 1 drop, Left Eye, BID, Sonali Guzman MD, 1 drop at 02/01/22 0845  •  budesonide-formoterol (SYMBICORT) 160-4.5 MCG/ACT inhaler 2 puff, 2 puff, Inhalation, BID - RT, Flip Nolan MD, 2 puff at 02/01/22 0823  •  carvedilol (COREG) tablet 25 mg, 25 mg, Oral, Daily, Flip Nolan MD, 25 mg at 02/01/22 0851  •  clopidogrel (PLAVIX) tablet 75 mg, 75 mg, Oral, Daily, Flip Nolan MD, 75 mg at 02/01/22 0844  •  cyclopentolate (CYCLOGYL) 1 % ophthalmic solution 1 drop, 1 drop, Left Eye, TID, Sonali Guzman MD, 1 drop at 02/01/22 0844  •  erythromycin (ROMYCIN) ophthalmic ointment, , Left Eye, Q12H, Sonali Guzman MD, Given at 02/01/22 0845  •  escitalopram (LEXAPRO) tablet 10 mg, 10 mg, Oral, Daily, Flip Nolan MD, 10  mg at 02/01/22 0844  •  furosemide (LASIX) tablet 20 mg, 20 mg, Oral, Daily, Flip Nolan MD, 20 mg at 02/01/22 0851  •  Glycerin-Hypromellose- (ARTIFICIAL TEARS) 0.2-0.2-1 % ophthalmic solution solution 1 drop, 1 drop, Left Eye, Q2H While Awake, Sonali Guzman MD, 1 drop at 02/01/22 1148  •  HYDROcodone-acetaminophen (NORCO) 5-325 MG per tablet 1 tablet, 1 tablet, Oral, Q4H PRN **OR** HYDROcodone-acetaminophen (NORCO) 5-325 MG per tablet 2 tablet, 2 tablet, Oral, Q4H PRN, Flip Nolan MD, 2 tablet at 02/01/22 1250  •  ipratropium-albuterol (DUO-NEB) nebulizer solution 3 mL, 3 mL, Nebulization, Q4H PRN, Flip Nolan MD  •  levothyroxine (SYNTHROID, LEVOTHROID) tablet 175 mcg, 175 mcg, Oral, Q AM, Flip Nolan MD, 175 mcg at 02/01/22 0604  •  losartan (COZAAR) tablet 50 mg, 50 mg, Oral, Q24H, Flip Nolan MD, 50 mg at 02/01/22 0851  •  nitroglycerin (NITROSTAT) SL tablet 0.4 mg, 0.4 mg, Sublingual, Q5 Min PRN, Flip Nolan MD  •  ondansetron (ZOFRAN) injection 4 mg, 4 mg, Intravenous, Q6H PRN, Flip Nolan MD, 4 mg at 01/31/22 1311  •  pantoprazole (PROTONIX) EC tablet 40 mg, 40 mg, Oral, Q AM, Flip Nolan MD, 40 mg at 02/01/22 0604  •  Pharmacy to Dose acyclovir (ZOVIRAX), , Does not apply, Continuous PRN, Flip Nolan MD  •  Pharmacy to dose vancomycin, , Does not apply, Continuous PRN, Flip Nolan MD  •  prednisoLONE acetate (PRED FORTE) 1 % ophthalmic suspension 1 drop, 1 drop, Left Eye, Q4H While Awake, Sonali Guzman MD, 1 drop at 02/01/22 1147  •  rosuvastatin (CRESTOR) tablet 10 mg, 10 mg, Oral, Nightly, Flip Nolan MD, 10 mg at 01/31/22 2109  •  [COMPLETED] Insert peripheral IV, , , Once **AND** sodium chloride 0.9 % flush 10 mL, 10 mL, Intravenous, PRN, Aniket Gonzalez MD  •  vancomycin (VANCOCIN) in iso-osmotic dextrose IVPB 1 g (premix) 200 mL, 1,000 mg, Intravenous, Q12H, Flip Nolan MD, 1,000 mg at 02/01/22 1152     ASSESSMENT  Herpes zoster involving the left eye with  surrounding cellulitis  Chronic hypoxic respiratory failure  Chronic diastolic congestive heart failure  Paroxysmal atrial fibrillation  Hypertension   Hypothyroidism   Morbidly obese  Depression  Obstructive sleep apnea  Gastroesophageal reflux disease    PLAN  CPM  IV acyclovir  Empiric IV antibiotics  Ophthalmology consult appreciated  Infectious disease to follow patient  Continue home medications  Stress ulcer DVT prophylaxis  Supportive care  PT/OT  Discussed with nursing staff  Follow closely and further recommendation according to hospital course    LISA LOBO MD

## 2022-02-01 NOTE — DISCHARGE PLACEMENT REQUEST
"Asim Zapien (61 y.o. Female)             Date of Birth Social Security Number Address Home Phone MRN    1960  [address] [place] [id number] [phone] [id number]    Catholic Marital Status             [facility] Single       Admission Date Admission Type Admitting Provider Attending Provider Department, Room/Bed    1/30/22 Emergency Flip Nolan MD Ahmed, Aftab, MD [address], [address]    Discharge Date Discharge Disposition Discharge Destination                         Attending Provider: Flip Nolan MD    Allergies: Cephalexin    Isolation: None   Infection: Herpes Zoster (01/31/22)   Code Status: CPR   Advance Care Planning Activity    Ht: 170.2 cm (67\")   Wt: 157 kg (347 lb)    Admission Cmt: None   Principal Problem: None                Active Insurance as of 1/30/2022     Primary Coverage     Payor Plan Insurance Group Employer/Plan Group    [facility] BY [facility]O [id number]     Payor Plan Address Payor Plan Phone Number Payor Plan Fax Number Effective Dates    [address]   11/1/2021 - None Entered    [facility] [id number]       Subscriber Name Subscriber Birth Date Member ID       ASIM ZAPIEN 1960 [id number]                 Emergency Contacts      (Rel.) Home Phone Work Phone Mobile Phone    do zapien (Son) -- -- [phone]              "

## 2022-02-01 NOTE — PROGRESS NOTES
Psychiatric Clinical Pharmacy Services: Vancomycin Level Monitoring Note    Anne Gentile is a 61 y.o. female who is on day 3/5 of pharmacy to dose vancomycin for SSTI.    Estimated Creatinine Clearance: 143.6 mL/min (by C-G formula based on SCr of 0.64 mg/dL).    Current Vanc Dose: 1000 mg IV every  12  hours  Lab Results   Component Value Date    VANCOTROUGH 15.60 02/01/2022     Predicted AUC at current dose:453 mg/L.hr    Level is therapeutic, no changes at this time. Pharmacy is continuing to monitor and will adjust as needed.    Elisa Leija, PharmD  Clinical Pharmacist

## 2022-02-01 NOTE — SIGNIFICANT NOTE
02/01/22 0811   OTHER   Discipline occupational therapist   Rehab Time/Intention   Session Not Performed other (see comments)  (pt up ab adrianna, denies any difficulty with mobility yesterday with PT. Completing ADLs independently in room. No acute OT needs identified at this time.)

## 2022-02-02 ENCOUNTER — HOME HEALTH ADMISSION (OUTPATIENT)
Dept: HOME HEALTH SERVICES | Facility: HOME HEALTHCARE | Age: 62
End: 2022-02-02

## 2022-02-02 VITALS
BODY MASS INDEX: 45.99 KG/M2 | HEART RATE: 88 BPM | OXYGEN SATURATION: 94 % | RESPIRATION RATE: 18 BRPM | HEIGHT: 67 IN | TEMPERATURE: 97 F | DIASTOLIC BLOOD PRESSURE: 98 MMHG | WEIGHT: 293 LBS | SYSTOLIC BLOOD PRESSURE: 137 MMHG

## 2022-02-02 LAB
ANION GAP SERPL CALCULATED.3IONS-SCNC: 15.2 MMOL/L (ref 5–15)
BASOPHILS # BLD AUTO: 0.03 10*3/MM3 (ref 0–0.2)
BASOPHILS NFR BLD AUTO: 0.6 % (ref 0–1.5)
BUN SERPL-MCNC: 14 MG/DL (ref 8–23)
BUN/CREAT SERPL: 21.5 (ref 7–25)
CALCIUM SPEC-SCNC: 8.7 MG/DL (ref 8.6–10.5)
CHLORIDE SERPL-SCNC: 100 MMOL/L (ref 98–107)
CO2 SERPL-SCNC: 22.8 MMOL/L (ref 22–29)
CREAT SERPL-MCNC: 0.65 MG/DL (ref 0.57–1)
DEPRECATED RDW RBC AUTO: 46.8 FL (ref 37–54)
EOSINOPHIL # BLD AUTO: 0.08 10*3/MM3 (ref 0–0.4)
EOSINOPHIL NFR BLD AUTO: 1.5 % (ref 0.3–6.2)
ERYTHROCYTE [DISTWIDTH] IN BLOOD BY AUTOMATED COUNT: 13.2 % (ref 12.3–15.4)
GFR SERPL CREATININE-BSD FRML MDRD: 92 ML/MIN/1.73
GLUCOSE SERPL-MCNC: 96 MG/DL (ref 65–99)
HCT VFR BLD AUTO: 34.4 % (ref 34–46.6)
HGB BLD-MCNC: 10.9 G/DL (ref 12–15.9)
IMM GRANULOCYTES # BLD AUTO: 0.06 10*3/MM3 (ref 0–0.05)
IMM GRANULOCYTES NFR BLD AUTO: 1.1 % (ref 0–0.5)
LYMPHOCYTES # BLD AUTO: 1.6 10*3/MM3 (ref 0.7–3.1)
LYMPHOCYTES NFR BLD AUTO: 30.4 % (ref 19.6–45.3)
MCH RBC QN AUTO: 30.3 PG (ref 26.6–33)
MCHC RBC AUTO-ENTMCNC: 31.7 G/DL (ref 31.5–35.7)
MCV RBC AUTO: 95.6 FL (ref 79–97)
MONOCYTES # BLD AUTO: 0.42 10*3/MM3 (ref 0.1–0.9)
MONOCYTES NFR BLD AUTO: 8 % (ref 5–12)
NEUTROPHILS NFR BLD AUTO: 3.07 10*3/MM3 (ref 1.7–7)
NEUTROPHILS NFR BLD AUTO: 58.4 % (ref 42.7–76)
NRBC BLD AUTO-RTO: 0.2 /100 WBC (ref 0–0.2)
PLAT MORPH BLD: NORMAL
PLATELET # BLD AUTO: 105 10*3/MM3 (ref 140–450)
PMV BLD AUTO: 10.5 FL (ref 6–12)
POTASSIUM SERPL-SCNC: 5.1 MMOL/L (ref 3.5–5.2)
RBC # BLD AUTO: 3.6 10*6/MM3 (ref 3.77–5.28)
RBC MORPH BLD: NORMAL
SODIUM SERPL-SCNC: 138 MMOL/L (ref 136–145)
WBC MORPH BLD: NORMAL
WBC NRBC COR # BLD: 5.26 10*3/MM3 (ref 3.4–10.8)

## 2022-02-02 PROCEDURE — 85025 COMPLETE CBC W/AUTO DIFF WBC: CPT | Performed by: HOSPITALIST

## 2022-02-02 PROCEDURE — 85007 BL SMEAR W/DIFF WBC COUNT: CPT | Performed by: HOSPITALIST

## 2022-02-02 PROCEDURE — 94799 UNLISTED PULMONARY SVC/PX: CPT

## 2022-02-02 PROCEDURE — 25010000002 ACYCLOVIR PER 5 MG: Performed by: STUDENT IN AN ORGANIZED HEALTH CARE EDUCATION/TRAINING PROGRAM

## 2022-02-02 PROCEDURE — 80048 BASIC METABOLIC PNL TOTAL CA: CPT | Performed by: HOSPITALIST

## 2022-02-02 PROCEDURE — 25010000002 VANCOMYCIN PER 500 MG: Performed by: HOSPITALIST

## 2022-02-02 RX ORDER — ERYTHROMYCIN 5 MG/G
OINTMENT OPHTHALMIC EVERY 12 HOURS
Qty: 3.5 G | Refills: 0 | Status: SHIPPED | OUTPATIENT
Start: 2022-02-02 | End: 2022-03-04

## 2022-02-02 RX ORDER — BRIMONIDINE TARTRATE 2 MG/ML
1 SOLUTION/ DROPS OPHTHALMIC 3 TIMES DAILY
Qty: 5 ML | Refills: 0 | Status: SHIPPED | OUTPATIENT
Start: 2022-02-02 | End: 2022-03-09

## 2022-02-02 RX ORDER — VALACYCLOVIR HYDROCHLORIDE 500 MG/1
1000 TABLET, FILM COATED ORAL EVERY 8 HOURS SCHEDULED
Status: DISCONTINUED | OUTPATIENT
Start: 2022-02-02 | End: 2022-02-02 | Stop reason: HOSPADM

## 2022-02-02 RX ORDER — PREDNISOLONE ACETATE 10 MG/ML
1 SUSPENSION/ DROPS OPHTHALMIC
Qty: 5 ML | Refills: 0 | Status: SHIPPED | OUTPATIENT
Start: 2022-02-02 | End: 2022-03-04

## 2022-02-02 RX ORDER — CYCLOPENTOLATE HYDROCHLORIDE 10 MG/ML
1 SOLUTION/ DROPS OPHTHALMIC 3 TIMES DAILY
Qty: 15 ML | Refills: 0 | Status: SHIPPED | OUTPATIENT
Start: 2022-02-02 | End: 2022-03-05

## 2022-02-02 RX ORDER — VALACYCLOVIR HYDROCHLORIDE 1 G/1
1000 TABLET, FILM COATED ORAL EVERY 8 HOURS SCHEDULED
Qty: 9 TABLET | Refills: 0 | Status: SHIPPED | OUTPATIENT
Start: 2022-02-02 | End: 2022-02-05

## 2022-02-02 RX ORDER — TIMOLOL MALEATE 5 MG/ML
1 SOLUTION/ DROPS OPHTHALMIC 2 TIMES DAILY
Qty: 10 ML | Refills: 0 | Status: SHIPPED | OUTPATIENT
Start: 2022-02-02 | End: 2022-03-04

## 2022-02-02 RX ADMIN — PREDNISOLONE ACETATE 1 DROP: 10 SUSPENSION/ DROPS OPHTHALMIC at 10:07

## 2022-02-02 RX ADMIN — GLYCERIN 1 DROP: .002; .002; .01 SOLUTION/ DROPS OPHTHALMIC at 10:09

## 2022-02-02 RX ADMIN — ERYTHROMYCIN: 5 OINTMENT OPHTHALMIC at 10:09

## 2022-02-02 RX ADMIN — VANCOMYCIN HYDROCHLORIDE 1000 MG: 1 INJECTION, SOLUTION INTRAVENOUS at 00:21

## 2022-02-02 RX ADMIN — CARVEDILOL 25 MG: 25 TABLET, FILM COATED ORAL at 10:07

## 2022-02-02 RX ADMIN — ASPIRIN 81 MG: 81 TABLET, COATED ORAL at 10:10

## 2022-02-02 RX ADMIN — GLYCERIN 1 DROP: .002; .002; .01 SOLUTION/ DROPS OPHTHALMIC at 10:08

## 2022-02-02 RX ADMIN — BRIMONIDINE TARTRATE 1 DROP: 2 SOLUTION OPHTHALMIC at 10:07

## 2022-02-02 RX ADMIN — LEVOTHYROXINE SODIUM 175 MCG: 0.17 TABLET ORAL at 07:01

## 2022-02-02 RX ADMIN — HYDROCODONE BITARTRATE AND ACETAMINOPHEN 1 TABLET: 5; 325 TABLET ORAL at 07:01

## 2022-02-02 RX ADMIN — ACYCLOVIR SODIUM 1000 MG: 1000 INJECTION, SOLUTION INTRAVENOUS at 07:02

## 2022-02-02 RX ADMIN — GLYCERIN 1 DROP: .002; .002; .01 SOLUTION/ DROPS OPHTHALMIC at 07:01

## 2022-02-02 RX ADMIN — GLYCERIN 1 DROP: .002; .002; .01 SOLUTION/ DROPS OPHTHALMIC at 12:36

## 2022-02-02 RX ADMIN — PANTOPRAZOLE SODIUM 40 MG: 40 TABLET, DELAYED RELEASE ORAL at 07:01

## 2022-02-02 RX ADMIN — BUDESONIDE AND FORMOTEROL FUMARATE DIHYDRATE 2 PUFF: 160; 4.5 AEROSOL RESPIRATORY (INHALATION) at 08:21

## 2022-02-02 RX ADMIN — ESCITALOPRAM 10 MG: 10 TABLET, FILM COATED ORAL at 10:07

## 2022-02-02 RX ADMIN — FUROSEMIDE 20 MG: 20 TABLET ORAL at 10:07

## 2022-02-02 RX ADMIN — CLOPIDOGREL 75 MG: 75 TABLET, FILM COATED ORAL at 10:07

## 2022-02-02 RX ADMIN — PREDNISOLONE ACETATE 1 DROP: 10 SUSPENSION/ DROPS OPHTHALMIC at 07:02

## 2022-02-02 RX ADMIN — TIMOLOL MALEATE 1 DROP: 5 SOLUTION/ DROPS OPHTHALMIC at 10:08

## 2022-02-02 RX ADMIN — LOSARTAN POTASSIUM 50 MG: 50 TABLET ORAL at 10:07

## 2022-02-02 RX ADMIN — CYCLOPENTOLATE HYDROCHLORIDE 1 DROP: 10 SOLUTION/ DROPS OPHTHALMIC at 10:07

## 2022-02-02 NOTE — PROGRESS NOTES
"Daily progress note    Chief complaint  Doing better  No new complaints  Wants to go home  Denies fever cough shortness of breath    History of present illness  61-year-old white female with history of chronic hypoxic respiratory failure paroxysmal atrial fibrillation diastolic congestive heart failure hypertension hypothyroidism obstructive sleep apnea who is well-known to our service presented to [facility] emergency room with left side of the face redness swelling pain for last 1 week which is getting worst.  Patient does have history of herpes zoster in the past long time ago.  Patient denies any fever chills cough chest pain abdominal pain nausea vomiting diarrhea.  Patient work-up in ER revealed recurrent herpes zoster with superadded bacterial cellulitis admit for management.     REVIEW OF SYSTEMS  Unremarkable     PHYSICAL EXAM  Blood pressure 137/98, pulse 88, temperature 97 °F (36.1 °C), temperature source Oral, resp. rate 18, height 170.2 cm (67\"), weight (!) 157 kg (347 lb), SpO2 94 %.    GENERAL:  Awake and alert no acute distress  HEENT:  Unremarkable except left-sided facial redness swelling tenderness with left eye closed and no drainage noted with vision intact  NECK: Supple, no meningismus  CV: regular rhythm, regular rate with intact distal pulses.  RESPIRATORY: normal effort and no respiratory distress.  Clear to auscultation bilaterally  ABDOMEN: soft and nontender.  Morbidly obese.  Bowel sounds positive  MUSCULOSKELETAL: no deformity.  Intact distal pulses  NEURO: alert and appropriate, moves all extremities, follows commands.  No focal motor or sensory changes.  SKIN: warm, dry     LAB RESULTS  Lab Results (last 24 hours)     Procedure Component Value Units Date/Time    Basic Metabolic Panel [[id number]]  (Abnormal) Collected: 02/02/22 0602    Specimen: Blood Updated: 02/02/22 0952     Glucose 96 mg/dL      BUN 14 mg/dL      Creatinine 0.65 mg/dL      Sodium 138 mmol/L      " Potassium 5.1 mmol/L      Comment: Slight hemolysis detected by analyzer. Results may be affected.        Chloride 100 mmol/L      CO2 22.8 mmol/L      Calcium 8.7 mg/dL      eGFR Non African Amer 92 mL/min/1.73      BUN/Creatinine Ratio 21.5     Anion Gap 15.2 mmol/L     Narrative:      GFR Normal >60  Chronic Kidney Disease <60  Kidney Failure <15      CBC & Differential [[id number]]  (Abnormal) Collected: 02/02/22 0602    Specimen: Blood Updated: 02/02/22 0803    Narrative:      The following orders were created for panel order CBC & Differential.  Procedure                               Abnormality         Status                     ---------                               -----------         ------                     CBC Auto Differential[[id number]]        Abnormal            Final result               Scan Slide[[id number]]                   Normal              Final result                 Please view results for these tests on the individual orders.    Scan Slide [[id number]]  (Normal) Collected: 02/02/22 0602    Specimen: Blood Updated: 02/02/22 0803     RBC Morphology Normal     WBC Morphology Normal     Platelet Morphology Normal    CBC Auto Differential [[id number]]  (Abnormal) Collected: 02/02/22 0602    Specimen: Blood Updated: 02/02/22 0802     WBC 5.26 10*3/mm3      RBC 3.60 10*6/mm3      Hemoglobin 10.9 g/dL      Hematocrit 34.4 %      MCV 95.6 fL      MCH 30.3 pg      MCHC 31.7 g/dL      RDW 13.2 %      RDW-SD 46.8 fl      MPV 10.5 fL      Platelets 105 10*3/mm3      Neutrophil % 58.4 %      Lymphocyte % 30.4 %      Monocyte % 8.0 %      Eosinophil % 1.5 %      Basophil % 0.6 %      Immature Grans % 1.1 %      Neutrophils, Absolute 3.07 10*3/mm3      Lymphocytes, Absolute 1.60 10*3/mm3      Monocytes, Absolute 0.42 10*3/mm3      Eosinophils, Absolute 0.08 10*3/mm3      Basophils, Absolute 0.03 10*3/mm3      Immature Grans, Absolute 0.06 10*3/mm3      nRBC 0.2 /100 WBC     Blood Culture - Blood, Hand,  Left [[id number]]  (Normal) Collected: 01/30/22 1902    Specimen: Blood from Hand, Left Updated: 02/01/22 1915     Blood Culture No growth at 2 days    Blood Culture - Blood, Arm, Left [[id number]]  (Normal) Collected: 01/30/22 1556    Specimen: Blood from Arm, Left Updated: 02/01/22 1616     Blood Culture No growth at 2 days        Imaging Results (Last 24 Hours)     ** No results found for the last 24 hours. **          Current Facility-Administered Medications:   •  albuterol (PROVENTIL) nebulizer solution 0.083% 2.5 mg/3mL, 2.5 mg, Nebulization, Q6H PRN, Flip Nolan MD  •  aspirin EC tablet 81 mg, 81 mg, Oral, Daily, Flip Nolan MD, 81 mg at 02/02/22 1010  •  brimonidine (ALPHAGAN) 0.2 % ophthalmic solution 1 drop, 1 drop, Left Eye, TID, 1 drop at 02/02/22 1007 **AND** timolol (TIMOPTIC) 0.5 % ophthalmic solution 1 drop, 1 drop, Left Eye, BID, Sonali Guzman MD, 1 drop at 02/02/22 1008  •  budesonide-formoterol (SYMBICORT) 160-4.5 MCG/ACT inhaler 2 puff, 2 puff, Inhalation, BID - RT, Flip Nolan MD, 2 puff at 02/02/22 0821  •  carvedilol (COREG) tablet 25 mg, 25 mg, Oral, Daily, Flip Nolan MD, 25 mg at 02/02/22 1007  •  clopidogrel (PLAVIX) tablet 75 mg, 75 mg, Oral, Daily, Flip Nolan MD, 75 mg at 02/02/22 1007  •  cyclopentolate (CYCLOGYL) 1 % ophthalmic solution 1 drop, 1 drop, Left Eye, TID, Sonali Guzman MD, 1 drop at 02/02/22 1007  •  erythromycin (ROMYCIN) ophthalmic ointment, , Left Eye, Q12H, Sonali Guzman MD, Given at 02/02/22 1009  •  escitalopram (LEXAPRO) tablet 10 mg, 10 mg, Oral, Daily, Flip Nolan MD, 10 mg at 02/02/22 1007  •  furosemide (LASIX) tablet 20 mg, 20 mg, Oral, Daily, Flip Nolan MD, 20 mg at 02/02/22 1007  •  Glycerin-Hypromellose- (ARTIFICIAL TEARS) 0.2-0.2-1 % ophthalmic solution solution 1 drop, 1 drop, Left Eye, Q2H While Awake, Sonali Guzman MD, 1 drop at 02/02/22 1236  •  HYDROcodone-acetaminophen (NORCO) 5-325 MG per tablet 1 tablet, 1 tablet,  Oral, Q4H PRN, 1 tablet at 02/02/22 0701 **OR** [DISCONTINUED] HYDROcodone-acetaminophen (NORCO) 5-325 MG per tablet 2 tablet, 2 tablet, Oral, Q4H PRN, Lisa Nolan MD, 2 tablet at 02/01/22 1250  •  ipratropium-albuterol (DUO-NEB) nebulizer solution 3 mL, 3 mL, Nebulization, Q4H PRN, Lisa Nolan MD  •  levothyroxine (SYNTHROID, LEVOTHROID) tablet 175 mcg, 175 mcg, Oral, Q AM, Lisa Nolan MD, 175 mcg at 02/02/22 0701  •  losartan (COZAAR) tablet 50 mg, 50 mg, Oral, Q24H, Lisa Nolan MD, 50 mg at 02/02/22 1007  •  ondansetron (ZOFRAN) injection 4 mg, 4 mg, Intravenous, Q6H PRN, Lisa Nolan MD, 4 mg at 01/31/22 1311  •  pantoprazole (PROTONIX) EC tablet 40 mg, 40 mg, Oral, Q AM, Lisa Nolan MD, 40 mg at 02/02/22 0701  •  prednisoLONE acetate (PRED FORTE) 1 % ophthalmic suspension 1 drop, 1 drop, Left Eye, Q4H While Awake, Sonali Guzman MD, 1 drop at 02/02/22 1007  •  rosuvastatin (CRESTOR) tablet 10 mg, 10 mg, Oral, Nightly, Lisa Nolan MD, 10 mg at 02/01/22 2157  •  [COMPLETED] Insert peripheral IV, , , Once **AND** sodium chloride 0.9 % flush 10 mL, 10 mL, Intravenous, PRN, Aniket Gonzalez MD  •  valACYclovir (VALTREX) tablet 1,000 mg, 1,000 mg, Oral, Q8H, Emerson Canseco MD     ASSESSMENT  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

## 2022-02-02 NOTE — CASE MANAGEMENT/SOCIAL WORK
Continued Stay Note  [facility]     Patient Name: Anne Gentile  MRN: [id number]  Today's Date: 2/2/2022    Admit Date: 1/30/2022     Discharge Plan     Row Name 02/02/22 1202       Plan    Plan Home    Plan Comments S/W pt who confirms her plan is to return home.  She lives alone, but states she is ambulating without difficulty.  She has a walker, w/c, nebulizer and home O2 thru [facility]. [facility] and is following, but pt does not feel she will need HH.  One of her sisters will be able to provide transport home upon DC. .......sk               Discharge Codes    No documentation.               Expected Discharge Date and Time     Expected Discharge Date Expected Discharge Time    Feb 2, 2022             Idania Falcon RN

## 2022-02-02 NOTE — CASE MANAGEMENT/SOCIAL WORK
Case Management Discharge Note      Final Note: DC home.  Pt declines HH ........sk         Selected Continued Care - Discharged on 2/2/2022 Admission date: 1/30/2022 - Discharge disposition: Home or Self Care    Destination    No services have been selected for the patient.              Durable Medical Equipment    No services have been selected for the patient.              Dialysis/Infusion    No services have been selected for the patient.              Home Medical Care     Service Provider Selected Services Address Phone Fax Patient Preferred     [facility]  [facility] [address] [address] [phone] --          Therapy    No services have been selected for the patient.              Community Resources    No services have been selected for the patient.              Community & DME    No services have been selected for the patient.                       Final Discharge Disposition Code: 01 - home or self-care

## 2022-02-02 NOTE — PROGRESS NOTES
"  Infectious Diseases Progress Note    Emerson Canseco MD     [facility]  [place]: 3 days  Patient Identification:  Name: Anne Gentile  Age: 61 y.o.  Sex: female  :  1960  MRN: [id number]         Primary Care Physician: Radha Stewart MD            Subjective:no new issues..  Interval History: See consultation note.    Objective:    Scheduled Meds:acyclovir, 10 mg/kg (Adjusted), Intravenous, Q8H  aspirin, 81 mg, Oral, Daily  brimonidine, 1 drop, Left Eye, TID   And  timolol, 1 drop, Left Eye, BID  budesonide-formoterol, 2 puff, Inhalation, BID - RT  carvedilol, 25 mg, Oral, Daily  clopidogrel, 75 mg, Oral, Daily  cyclopentolate, 1 drop, Left Eye, TID  erythromycin, , Left Eye, Q12H  escitalopram, 10 mg, Oral, Daily  furosemide, 20 mg, Oral, Daily  Glycerin-Hypromellose-, 1 drop, Left Eye, Q2H While Awake  levothyroxine, 175 mcg, Oral, Q AM  losartan, 50 mg, Oral, Q24H  pantoprazole, 40 mg, Oral, Q AM  prednisoLONE acetate, 1 drop, Left Eye, Q4H While Awake  rosuvastatin, 10 mg, Oral, Nightly  vancomycin, 1,000 mg, Intravenous, Q12H      Continuous Infusions:Pharmacy to Dose acyclovir (ZOVIRAX),   Pharmacy to dose vancomycin,         Vital signs in last 24 hours:  Temp:  [97 °F (36.1 °C)-98.2 °F (36.8 °C)] 97 °F (36.1 °C)  Heart Rate:  [] 88  Resp:  [16-18] 18  BP: ()/(49-98) 137/98    Intake/Output:  No intake or output data in the 24 hours ending 22 1026    Exam:  /98 (BP Location: Left arm, Patient Position: Sitting)   Pulse 88   Temp 97 °F (36.1 °C) (Oral)   Resp 18   Ht 170.2 cm (67\")   Wt (!) 157 kg (347 lb)   SpO2 94%   BMI 54.35 kg/m²   Patient is examined using the personal protective equipment as per guidelines from infection control for this particular patient as enacted.  Hand washing was performed before and after patient interaction.  General Appearance:    Alert, cooperative, no distress, AAOx3                          Head:    " Normocephalic, without obvious abnormality, atraumatic left forehead and frontal area vesicular rash noted.                           Eyes:  Left periorbital vesicular rash with no surrounding cellulitis.                         Throat:   Lips, tongue, gums normal; oral mucosa pink and moist                           Neck:   Supple, symmetrical, trachea midline, no JVD                         Lungs:    Clear to auscultation bilaterally, respirations unlabored                 Chest Wall:    No tenderness or deformity                          Heart:  S1-S2 regular                  Abdomen:   Obese soft nontender                 extremities:   Extremities normal, atraumatic, no cyanosis or edema                        Pulses:   Pulses palpable in all extremities                            Skin:   Skin is warm and dry,  no rashes or palpable lesions                  Neurologic: Photophobia involving the left eye otherwise grossly nonfocal examination.       Data Review:    I reviewed the patient's new clinical results.  Results from last 7 days   Lab Units 02/02/22  0602 02/01/22  0531 01/31/22  0723 01/30/22  1556   WBC 10*3/mm3 5.26 4.84 4.10 4.01   HEMOGLOBIN g/dL 10.9* 11.7* 12.0 12.4   PLATELETS 10*3/mm3 105* 104* 104* 104*     Results from last 7 days   Lab Units 02/02/22  0602 02/01/22  0531 01/31/22  0723 01/30/22  1556   SODIUM mmol/L 138 135* 139 141   POTASSIUM mmol/L 5.1 4.6 4.5 3.8   CHLORIDE mmol/L 100 99 101 100   CO2 mmol/L 22.8 29.0 30.5* 29.2*   BUN mg/dL 14 14 16 13   CREATININE mg/dL 0.65 0.64 0.67 0.84   CALCIUM mg/dL 8.7 8.8 8.8 9.4   GLUCOSE mg/dL 96 118* 113* 118*       Microbiology Results (last 10 days)     Procedure Component Value - Date/Time    Blood Culture - Blood, Hand, Left [[id number]]  (Normal) Collected: 01/30/22 1902    Lab Status: Preliminary result Specimen: Blood from Hand, Left Updated: 02/01/22 1915     Blood Culture No growth at 2 days    COVID PRE-OP / PRE-PROCEDURE SCREENING  ORDER (NO ISOLATION) - Swab, Nasopharynx [[id number]]  (Normal) Collected: 01/30/22 1604    Lab Status: Final result Specimen: Swab from Nasopharynx Updated: 01/30/22 1634    Narrative:      The following orders were created for panel order COVID PRE-OP / PRE-PROCEDURE SCREENING ORDER (NO ISOLATION) - Swab, Nasopharynx.  Procedure                               Abnormality         Status                     ---------                               -----------         ------                     COVID-19,[facility]...[[id number]]  Normal              Final result                 Please view results for these tests on the individual orders.    COVID-19,[facility]-HOU[facility] CEPHEID/MARILIN NP SWAB IN TRANSPORT MEDIA 8-12 HR TAT - Swab, Nasopharynx [[id number]]  (Normal) Collected: 01/30/22 1604    Lab Status: Final result Specimen: Swab from Nasopharynx Updated: 01/30/22 1634     COVID19 Not Detected    Narrative:      Fact sheet for providers: https://www.fda.gov/media/233778/download    Fact sheet for patients: https://www.fda.gov/media/626612/download    Test performed by PCR.    Blood Culture - Blood, Arm, Left [[id number]]  (Normal) Collected: 01/30/22 1556    Lab Status: Preliminary result Specimen: Blood from Arm, Left Updated: 02/01/22 1616     Blood Culture No growth at 2 days          Assessment:    Herpes zoster ophthalmicus  1-left V1 herpes zoster with ophthalmic of and possible keratitis and uveitis  2-morbid obesity  3-history of hypothyroidism  4-COPD  5-other diagnosis per primary team.        Recommendations/Discussions:  See my recommendations and discussion on 1/31/2022.  Discussed with patient's caring nurse patient and with Dr. Nolan that from infectious disease standpoint patient can be discharged on oral Valtrex 1 g every 8 hours for total of 5 days counting acyclovir that she has received here, Once considered stable from medical standpoint.  Emerson Canseco MD  2/2/2022  10:26 EST    Much of this encounter  note is an electronic transcription/translation of spoken language to printed text. The electronic translation of spoken language may permit erroneous, or at times, nonsensical words or phrases to be inadvertently transcribed; Although I have reviewed the note for such errors, some may still exist

## 2022-02-02 NOTE — DISCHARGE SUMMARY
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  Chronic hypoxic respiratory failure  Chronic diastolic congestive heart failure  Paroxysmal atrial fibrillation  Hypertension   Hypothyroidism   Morbidly obese  Depression  Obstructive sleep apnea  Gastroesophageal reflux disease    Discharge medications    Current Facility-Administered Medications:   •  aspirin EC tablet 81 mg, 81 mg, Oral, Daily, Flip Nolan MD, 81 mg at 02/02/22 1010  •  brimonidine (ALPHAGAN) 0.2 % ophthalmic solution 1 drop, 1 drop, Left Eye, TID, 1 drop at 02/02/22 1007 **AND** timolol (TIMOPTIC) 0.5 % ophthalmic solution 1 drop, 1 drop, Left Eye, BID, Sonali Guzman MD, 1 drop at 02/02/22 1008  •  budesonide-formoterol (SYMBICORT) 160-4.5 MCG/ACT inhaler 2 puff, 2 puff, Inhalation, BID - RT, Flip Nolan MD, 2 puff at 02/02/22 0821  •  carvedilol (COREG) tablet 25 mg, 25 mg, Oral, Daily, Flip Nolan MD, 25 mg at 02/02/22 1007  •  clopidogrel (PLAVIX) tablet 75 mg, 75 mg, Oral, Daily, Flip Nolan MD, 75 mg at 02/02/22 1007  •  cyclopentolate (CYCLOGYL) 1 % ophthalmic solution 1 drop, 1 drop, Left Eye, TID, Sonali Guzman MD, 1 drop at 02/02/22 1007  •  erythromycin (ROMYCIN) ophthalmic ointment, , Left Eye, Q12H, Sonali Guzman MD, Given at 02/02/22 1009  •  escitalopram (LEXAPRO) tablet 10 mg, 10 mg, Oral, Daily, Flip Nolan MD, 10 mg at 02/02/22 1007  •  furosemide (LASIX) tablet 20 mg, 20 mg, Oral, Daily, Flip Nolan MD, 20 mg at 02/02/22 1007  •  Glycerin-Hypromellose- (ARTIFICIAL TEARS) 0.2-0.2-1 % ophthalmic solution solution 1 drop, 1 drop, Left Eye, Q2H While Awake, Sonali Guzman MD, 1 drop at 02/02/22 1236  •  levothyroxine (SYNTHROID, LEVOTHROID) tablet 175 mcg, 175 mcg, Oral, Q AM, Flip Nolan MD, 175 mcg at 02/02/22 0701  •  losartan (COZAAR) tablet 50 mg, 50 mg, Oral, Q24H, Flip Nolan MD, 50 mg at  02/02/22 1007  •  pantoprazole (PROTONIX) EC tablet 40 mg, 40 mg, Oral, Q AM, Lisa Nolan MD, 40 mg at 02/02/22 0701  •  prednisoLONE acetate (PRED FORTE) 1 % ophthalmic suspension 1 drop, 1 drop, Left Eye, Q4H While Awake, Sonali Guzman MD, 1 drop at 02/02/22 1007  •  rosuvastatin (CRESTOR) tablet 10 mg, 10 mg, Oral, Nightly, Lisa Nolan MD, 10 mg at 02/01/22 2157  •  [COMPLETED] Insert peripheral IV, , , Once **AND** sodium chloride 0.9 % flush 10 mL, 10 mL, Intravenous, PRN, Aniket Gonzalez MD  •  valACYclovir (VALTREX) tablet 1,000 mg, 1,000 mg, Oral, Q8H, Emerson Canseco MD     Consults obtained  Ophthalmology  Infectious disease     Procedures   None    Hospital course  61-year-old white female with multiple medical problems admitted to emergency room with left-sided face swelling redness pain involving the left eye.  Patient work-up in ER revealed herpes zoster ophthalmicus with conjunctivitis uveitis and keratitis.  Patient started on IV acyclovir and ophthalmology consult obtained and they started multiple eyedrops.  Patient also has possible cellulitis and received empiric IV antibiotics.  Patient further evaluated by infectious disease and recommend acyclovir for 5 days and stop IV vancomycin and continue local treatment.  Patient is feeling much better and clear for discharge with close follow-up with ophthalmology and infectious disease.  Patient going home with family support and home health.    Discharge diet regular    Activity as tolerated    Medication as above    Follow-up with primary doctor in 1 week and follow-up with ophthalmology and infectious disease per the instruction and take medication as directed    LISA NOLAN MD

## 2022-02-03 NOTE — PAYOR COMM NOTE
"Asim Zapien (61 y.o. Female)     PLEASE SEE ATTACHED DC SUMMARY    REF#[id number]    THANK YOU    TYREE SILVA LPN CCP            Date of Birth Social Security Number Address Home Phone MRN    1960  [id number] [facility] [phone] [id number]    Caodaism Marital Status             [facility] Single       Admission Date Admission Type Admitting Provider Attending Provider Department, Room/Bed    1/30/22 Emergency Flip Nolan MD  [address], [address]/1    Discharge Date Discharge Disposition Discharge Destination          2/2/2022 Home or Self Care              Attending Provider: (none)   Allergies: Cephalexin    Isolation: None   Infection: Herpes Zoster (01/31/22)   Code Status: Prior   Advance Care Planning Activity    Ht: 170.2 cm (67\")   Wt: 157 kg (347 lb)    Admission Cmt: None   Principal Problem: None                Active Insurance as of 1/30/2022     Primary Coverage     Payor Plan Insurance Group Employer/Plan Group    [facility] BY NETO [facility] BY NETO [id number]     Payor Plan Address Payor Plan Phone Number Payor Plan Fax Number Effective Dates    [address]   11/1/2021 - None Entered    [facility] [id number]       Subscriber Name Subscriber Birth Date Member ID       ASIM ZAPIEN 1960 [id number]                 Emergency Contacts      (Rel.) Home Phone Work Phone Mobile Phone    annahoneydo harley (Son) -- -- [phone]               Discharge Summary      Flip Nolan MD at 02/02/22 1259        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  Chronic hypoxic respiratory failure  Chronic diastolic congestive heart failure  Paroxysmal atrial fibrillation  Hypertension   Hypothyroidism   Morbidly obese  Depression  Obstructive sleep apnea  Gastroesophageal reflux disease    Discharge medications    Current " Facility-Administered Medications:   •  aspirin EC tablet 81 mg, 81 mg, Oral, Daily, Flip Nolan MD, 81 mg at 02/02/22 1010  •  brimonidine (ALPHAGAN) 0.2 % ophthalmic solution 1 drop, 1 drop, Left Eye, TID, 1 drop at 02/02/22 1007 **AND** timolol (TIMOPTIC) 0.5 % ophthalmic solution 1 drop, 1 drop, Left Eye, BID, Sonali Guzman MD, 1 drop at 02/02/22 1008  •  budesonide-formoterol (SYMBICORT) 160-4.5 MCG/ACT inhaler 2 puff, 2 puff, Inhalation, BID - RT, Flip Nolan MD, 2 puff at 02/02/22 0821  •  carvedilol (COREG) tablet 25 mg, 25 mg, Oral, Daily, Flip Nolan MD, 25 mg at 02/02/22 1007  •  clopidogrel (PLAVIX) tablet 75 mg, 75 mg, Oral, Daily, Flip Nolan MD, 75 mg at 02/02/22 1007  •  cyclopentolate (CYCLOGYL) 1 % ophthalmic solution 1 drop, 1 drop, Left Eye, TID, Sonali Guzman MD, 1 drop at 02/02/22 1007  •  erythromycin (ROMYCIN) ophthalmic ointment, , Left Eye, Q12H, Sonali Guzman MD, Given at 02/02/22 1009  •  escitalopram (LEXAPRO) tablet 10 mg, 10 mg, Oral, Daily, Flip Nolan MD, 10 mg at 02/02/22 1007  •  furosemide (LASIX) tablet 20 mg, 20 mg, Oral, Daily, Flip Nolan MD, 20 mg at 02/02/22 1007  •  Glycerin-Hypromellose- (ARTIFICIAL TEARS) 0.2-0.2-1 % ophthalmic solution solution 1 drop, 1 drop, Left Eye, Q2H While Awake, Sonali Guzman MD, 1 drop at 02/02/22 1236  •  levothyroxine (SYNTHROID, LEVOTHROID) tablet 175 mcg, 175 mcg, Oral, Q AM, Flip Nolan MD, 175 mcg at 02/02/22 0701  •  losartan (COZAAR) tablet 50 mg, 50 mg, Oral, Q24H, Flip Nolan MD, 50 mg at 02/02/22 1007  •  pantoprazole (PROTONIX) EC tablet 40 mg, 40 mg, Oral, Q AM, Flip Nolan MD, 40 mg at 02/02/22 0701  •  prednisoLONE acetate (PRED FORTE) 1 % ophthalmic suspension 1 drop, 1 drop, Left Eye, Q4H While Awake, Sonali Guzman MD, 1 drop at 02/02/22 1007  •  rosuvastatin (CRESTOR) tablet 10 mg, 10 mg, Oral, Nightly, Flip Nolan MD, 10 mg at 02/01/22 2360  •  [COMPLETED] Insert peripheral IV, , ,  Once **AND** sodium chloride 0.9 % flush 10 mL, 10 mL, Intravenous, PRN, Aniket Gonzalez MD  •  valACYclovir (VALTREX) tablet 1,000 mg, 1,000 mg, Oral, Q8H, Emerson Canseco MD     Consults obtained  Ophthalmology  Infectious disease     Procedures   None    Hospital course  61-year-old white female with multiple medical problems admitted to emergency room with left-sided face swelling redness pain involving the left eye.  Patient work-up in ER revealed herpes zoster ophthalmicus with conjunctivitis uveitis and keratitis.  Patient started on IV acyclovir and ophthalmology consult obtained and they started multiple eyedrops.  Patient also has possible cellulitis and received empiric IV antibiotics.  Patient further evaluated by infectious disease and recommend acyclovir for 5 days and stop IV vancomycin and continue local treatment.  Patient is feeling much better and clear for discharge with close follow-up with ophthalmology and infectious disease.  Patient going home with family support and home health.    Discharge diet regular    Activity as tolerated    Medication as above    Follow-up with primary doctor in 1 week and follow-up with ophthalmology and infectious disease per the instruction and take medication as directed    LISA LOBO MD        Electronically signed by Lisa Lobo MD at 02/02/22 7963

## 2022-02-04 LAB
BACTERIA SPEC AEROBE CULT: NORMAL
BACTERIA SPEC AEROBE CULT: NORMAL

## 2022-08-03 NOTE — CONSULTS
CONSULT NOTE    Infectious Diseases - Emerson Coello MD  [facility]       Patient Identification:  Name: Anne Gentile  Age: 61 y.o.  Sex: female  :  1960  MRN: [id number]             Date of Consultation: 2022      Primary Care Physician: Radha Stewart MD                               Requesting Physician: Dr. Nolan  Reason for Consultation: Shingles in a patient is a 51-year-old female    Impression: Patient is a 51-year-old female with past medical history remarkable for COPD, morbid obesity, hypothyroidism as well as atrial fibrillation was in her usual state of health about a week ago when she started and discomfort on the left side of her face periorbital area and back of the ear.  Her symptoms continued to get worse and pain reached a point that she decided to come to the emergency room and started noticing blisters around the left eye with increasing swelling.  On 2022 because of her continued symptoms and associated congestion and sinus pain she had a televisit and was prescribed amoxicillin with no improvement in symptoms.  She has not received zoster vaccine and does not have any significant wheezing changes but does have some discomfort with light.  In this patient presented to the emergency room and appropriately diagnosed with left V1 herpes zoster and has been started on IV acyclovir.  Ophthalmology service evaluated the patient.  Patient's major concern is severe pain.  This presentation of severe left periorbital and postauricular and anterior left forehead vesicular eruption with severe pain is consistent with:  1-left V1 herpes zoster with ophthalmic of and possible keratitis and uveitis  2-morbid obesity  3-history of hypothyroidism  4-COPD  5-other diagnosis per primary team.      Recommendations/Discussions:  Discussed with caring nurse earlier in the morning and plan is to continue with IV acyclovir with close monitoring of her visual problems.   Patient is out of the window to get any benefit from treatment of acute zoster to protect her from postherpetic neuralgia as her symptom has been ongoing for more than 1 week.  It is still reasonable to continue with acyclovir to address ocular involvement and for preservation of visual impairment while providing her with supportive care for pain and discomfort and possible use of medications such as amitriptyline etc. for neuropathic pain.  Patient may benefit down the road to be evaluated by pain management of her symptoms of pain and discomfort persist after the treatment of acute fracture.  Patient will need continued follow-up with pulmonology service.  Patient is considered ready to be discharged she could be switched to oral Valtrex 1 g 3 times daily for total of 5 days.      History of Present Illness:   Patient is a 51-year-old female with past medical history remarkable for COPD, morbid obesity, hypothyroidism as well as atrial fibrillation was in her usual state of health about a week ago when she started and discomfort on the left side of her face periorbital area and back of the ear.  Her symptoms continued to get worse and pain reached a point that she decided to come to the emergency room and started noticing blisters around the left eye with increasing swelling.  On 1/26/2022 because of her continued symptoms and associated congestion and sinus pain she had a televisit and was prescribed amoxicillin with no improvement in symptoms.  She has not received zoster vaccine and does not have any significant wheezing changes but does have some discomfort with light.  In this patient presented to the emergency room and appropriately diagnosed with left V1 herpes zoster and has been started on IV acyclovir.  Ophthalmology service evaluated the patient.  Patient's major concern is severe pain.      Past Medical History:  Past Medical History:   Diagnosis Date   • Arthritis    • Atrial fibrillation (HCC)    •  CHF (congestive heart failure) ([facility])    • COPD (chronic obstructive pulmonary disease) ([facility])    • Coronary artery disease    • COVID-19    • Depression    • Disease of thyroid gland    • Hypertension      Past Surgical History:  Past Surgical History:   Procedure Laterality Date   • BREAST SURGERY     • CARDIAC CATHETERIZATION N/A 9/24/2021    Procedure: Left Heart Cath;  Surgeon: Filemon Kumari MD;  Location:  [facility];  Service: Cardiology;  Laterality: N/A;   • CARDIAC CATHETERIZATION N/A 9/24/2021    Procedure: Coronary angiography;  Surgeon: Filemon Kumari MD;  Location:  [facility];  Service: Cardiology;  Laterality: N/A;   • CARDIAC CATHETERIZATION N/A 9/24/2021    Procedure: Left ventriculography;  Surgeon: Filemon Kumari MD;  Location:  [facility];  Service: Cardiology;  Laterality: N/A;   • CARDIAC CATHETERIZATION N/A 9/24/2021    Procedure: Stent NIKKI coronary;  Surgeon: Filemon Kumari MD;  Location:  [facility];  Service: Cardiology;  Laterality: N/A;   • CHOLECYSTECTOMY  26+ years ago   • KNEE ARTHROSCOPY Right 2013      Home Meds:  Medications Prior to Admission   Medication Sig Dispense Refill Last Dose   • albuterol (PROVENTIL) (2.5 MG/3ML) 0.083% nebulizer solution Inhale 1 vial by nebulization Every 4 (Four) Hours As Needed. 180 mL 11 1/30/2022 at Unknown time   • aspirin 81 MG chewable tablet Chew 81 mg Daily.   1/30/2022 at Unknown time   • carvedilol (COREG) 25 MG tablet Take 1 tablet by mouth 2 (Two) Times a Day With Meals. 180 tablet 1 1/30/2022 at Unknown time   • clopidogrel (PLAVIX) 75 MG tablet Take 1 tablet by mouth Daily. 90 tablet 3 1/30/2022 at Unknown time   • escitalopram (LEXAPRO) 10 MG tablet TAKE ONE TABLET BY MOUTH DAILY   1/30/2022 at Unknown time   • furosemide (LASIX) 20 MG tablet Take 1 tablet by mouth Daily. 90 tablet 3 1/30/2022 at Unknown time   • levothyroxine  Detail Level: Zone (SYNTHROID, LEVOTHROID) 175 MCG tablet Take 1 tablet by mouth daily. 90 tablet 1 1/30/2022 at Unknown time   • losartan (COZAAR) 50 MG tablet Take 1 tablet by mouth daily. 30 tablet 5 1/30/2022 at Unknown time   • omeprazole (priLOSEC) 20 MG capsule Take 1 capsule by mouth Daily. 90 capsule 0 1/30/2022 at Unknown time   • rosuvastatin (CRESTOR) 10 MG tablet Take 1 tablet by mouth Daily. 90 tablet 3 1/30/2022 at Unknown time   • budesonide-formoterol (SYMBICORT) 160-4.5 MCG/ACT inhaler Inhale 2 puffs 2 (Two) Times a Day for 30 days. 20.4 g 0    • dabigatran etexilate (Pradaxa) 150 MG capsu Take 1 capsule by mouth 2 (Two) Times a Day. 180 capsule 3    • ipratropium-albuterol (DUO-NEB) 0.5-2.5 mg/3 ml nebulizer Take 3 mL by nebulization Every 4 (Four) Hours for 30 days. 360 mL 0    • levothyroxine (SYNTHROID, LEVOTHROID) 175 MCG tablet Take 1 tablet by mouth Daily for 30 days. 30 tablet 0    • nitroglycerin (NITROSTAT) 0.4 MG SL tablet 1 under the tongue as needed for angina, may repeat q5mins for up three doses 100 tablet 11 More than a month at Unknown time   • omeprazole (priLOSEC) 20 MG capsule Take 1 capsule by mouth Daily. 90 capsule 0      Current Meds:     Current Facility-Administered Medications:   •  acyclovir (ZOVIRAX) 1,000 mg in sodium chloride 0.9 % 250 mL IVPB, 10 mg/kg (Adjusted), Intravenous, Q8H, Sonali Guzman MD, 1,000 mg at 01/31/22 1619  •  albuterol (PROVENTIL) nebulizer solution 0.083% 2.5 mg/3mL, 2.5 mg, Nebulization, Q6H PRN, Flip Nolan MD  •  aspirin EC tablet 81 mg, 81 mg, Oral, Daily, Flip Nolan MD, 81 mg at 01/31/22 1127  •  brimonidine (ALPHAGAN) 0.2 % ophthalmic solution 1 drop, 1 drop, Left Eye, TID, 1 drop at 01/31/22 2105 **AND** timolol (BETIMOL) 0.5 % ophthalmic solution 1 drop, 1 drop, Left Eye, BID, Sonali Guzman MD  •  budesonide-formoterol (SYMBICORT) 160-4.5 MCG/ACT inhaler 2 puff, 2 puff, Inhalation, BID - RT, Flip Nolan MD, 2 puff at 01/31/22 2000  •   Detail Level: Detailed carvedilol (COREG) tablet 25 mg, 25 mg, Oral, Daily, Flip Nolan MD, 25 mg at 01/31/22 1015  •  clopidogrel (PLAVIX) tablet 75 mg, 75 mg, Oral, Daily, Flip Nolan MD, 75 mg at 01/31/22 1015  •  cyclopentolate (CYCLOGYL) 1 % ophthalmic solution 1 drop, 1 drop, Left Eye, TID, Sonali Guzman MD, 1 drop at 01/31/22 2110  •  erythromycin (ROMYCIN) ophthalmic ointment, , Left Eye, Q12H, Sonali Guzman MD, Given at 01/31/22 2110  •  escitalopram (LEXAPRO) tablet 10 mg, 10 mg, Oral, Daily, Flip Nolan MD, 10 mg at 01/31/22 1015  •  furosemide (LASIX) tablet 20 mg, 20 mg, Oral, Daily, Flip Nolan MD, 20 mg at 01/31/22 1015  •  Glycerin-Hypromellose- (ARTIFICIAL TEARS) 0.2-0.2-1 % ophthalmic solution solution 1 drop, 1 drop, Left Eye, Q2H While Awake, Sonali Guzman MD, 1 drop at 01/31/22 2104  •  HYDROcodone-acetaminophen (NORCO) 5-325 MG per tablet 1 tablet, 1 tablet, Oral, Q4H PRN **OR** HYDROcodone-acetaminophen (NORCO) 5-325 MG per tablet 2 tablet, 2 tablet, Oral, Q4H PRN, Flip Nolan MD, 2 tablet at 01/31/22 1952  •  ipratropium-albuterol (DUO-NEB) nebulizer solution 3 mL, 3 mL, Nebulization, Q4H PRN, Flip Nolan MD  •  levothyroxine (SYNTHROID, LEVOTHROID) tablet 175 mcg, 175 mcg, Oral, Q AM, Flip Nolan MD, 175 mcg at 01/31/22 0634  •  losartan (COZAAR) tablet 50 mg, 50 mg, Oral, Q24H, Flip Nolan MD, 50 mg at 01/31/22 1015  •  nitroglycerin (NITROSTAT) SL tablet 0.4 mg, 0.4 mg, Sublingual, Q5 Min PRN, Flip Nolan MD  •  ondansetron (ZOFRAN) injection 4 mg, 4 mg, Intravenous, Q6H PRN, Flip Nolan MD, 4 mg at 01/31/22 1311  •  pantoprazole (PROTONIX) EC tablet 40 mg, 40 mg, Oral, Q AM, Flip Nolan MD, 40 mg at 01/31/22 0635  •  Pharmacy to Dose acyclovir (ZOVIRAX), , Does not apply, Continuous PRN, Flip Nolan MD  •  Pharmacy to dose vancomycin, , Does not apply, Continuous PRN, Flip Nolan MD  •  prednisoLONE acetate (PRED FORTE) 1 % ophthalmic suspension 1 drop, 1 drop, Left  "Eye, Q4H While Awake, Sonali Guzman MD, 1 drop at 01/31/22 2111  •  rosuvastatin (CRESTOR) tablet 10 mg, 10 mg, Oral, Nightly, Flip Nolan MD, 10 mg at 01/31/22 2109  •  [COMPLETED] Insert peripheral IV, , , Once **AND** sodium chloride 0.9 % flush 10 mL, 10 mL, Intravenous, PRN, Aniket Gonzalez MD  •  vancomycin (VANCOCIN) in iso-osmotic dextrose IVPB 1 g (premix) 200 mL, 1,000 mg, Intravenous, Q12H, Flip Nolan MD, 1,000 mg at 01/31/22 1446  Allergies:  Allergies   Allergen Reactions   • Cephalexin Hives     Social History:   Social History     Tobacco Use   • Smoking status: Former Smoker     Packs/day: 1.50     Years: 20.00     Pack years: 30.00     Quit date: 3/23/2011     Years since quitting: 10.8   • Smokeless tobacco: Never Used   Substance Use Topics   • Alcohol use: No      Family History:  Family History   Problem Relation Age of Onset   • Cancer Mother    • COPD Father    • Heart disease Father    • Hypertension Father           Review of Systems  See history of present illness and past medical history.   As described in history of presenting illness.  Remainder of ROS is negative.      Vitals:   /51 (BP Location: Left arm, Patient Position: Lying)   Pulse 89   Temp 98.2 °F (36.8 °C) (Oral)   Resp 16   Ht 170.2 cm (67\")   Wt (!) 157 kg (347 lb)   SpO2 98%   BMI 54.35 kg/m²   I/O:     Intake/Output Summary (Last 24 hours) at 1/31/2022 2247  Last data filed at 1/31/2022 0800  Gross per 24 hour   Intake 350 ml   Output --   Net 350 ml     Exam:  Patient is examined using the personal protective equipment as per guidelines from infection control for this particular patient as enacted.  Hand washing was performed before and after patient interaction.  General Appearance:    Alert, cooperative, female who is uncomfortable because of pain.   Head:    Normocephalic, without obvious abnormality, left periorbital forehead and pre and postauricular and frontal vesicular rash noted.  Mild " photophobia noted.   Eyes:   Left periorbital zoster lesions noted no cellulitis noted   Ears:    Normal external ear canals, both ears   Nose:   Nares normal, septum midline, mucosa normal, no drainage    or sinus tenderness   Throat:   Lips, tongue, gums normal; oral mucosa pink and moist   Neck:   Supple, symmetrical, trachea midline, no adenopathy;     thyroid:  no enlargement/tenderness/nodules; no carotid    bruit or JVD   Back:     Symmetric, no curvature, ROM normal, no CVA tenderness   Lungs:     Clear to auscultation bilaterally, respirations unlabored   Chest Wall:    No tenderness or deformity    Heart:    Regular rate and rhythm, S1 and S2 normal, no murmur, rub   or gallop   Abdomen:     Soft, non-tender, bowel sounds active all four quadrants,     no masses, no hepatomegaly, no splenomegaly   Extremities:   Extremities normal, atraumatic, no cyanosis or edema   Pulses:   Pulses palpable in all extremities; symmetric all extremities   Skin:   Skin color normal, Skin is warm and dry,  no rashes or palpable lesions   Neurologic:   CNII-XII intact, motor strength grossly intact, sensation grossly intact to light touch, no focal deficits noted       Data Review:    I reviewed the patient's new clinical results.  Results from last 7 days   Lab Units 01/31/22  0723 01/30/22  1556   WBC 10*3/mm3 4.10 4.01   HEMOGLOBIN g/dL 12.0 12.4   PLATELETS 10*3/mm3 104* 104*     Results from last 7 days   Lab Units 01/31/22  0723 01/30/22  1556   SODIUM mmol/L 139 141   POTASSIUM mmol/L 4.5 3.8   CHLORIDE mmol/L 101 100   CO2 mmol/L 30.5* 29.2*   BUN mg/dL 16 13   CREATININE mg/dL 0.67 0.84   CALCIUM mg/dL 8.8 9.4   GLUCOSE mg/dL 113* 118*     Microbiology Results (last 10 days)     Procedure Component Value - Date/Time    Blood Culture - Blood, Hand, Left [[id number]]  (Normal) Collected: 01/30/22 1902    Lab Status: Preliminary result Specimen: Blood from Hand, Left Updated: 01/31/22 1915     Blood Culture No growth at  24 hours    COVID PRE-OP / PRE-PROCEDURE SCREENING ORDER (NO ISOLATION) - Swab, Nasopharynx [[id number]]  (Normal) Collected: 01/30/22 1604    Lab Status: Final result Specimen: Swab from Nasopharynx Updated: 01/30/22 1634    Narrative:      The following orders were created for panel order COVID PRE-OP / PRE-PROCEDURE SCREENING ORDER (NO ISOLATION) - Swab, Nasopharynx.  Procedure                               Abnormality         Status                     ---------                               -----------         ------                     COVID-19,[facility]HOU[facility]...[[id number]]  Normal              Final result                 Please view results for these tests on the individual orders.    COVID-19,[facility] CEPHEID/MARILIN NP SWAB IN TRANSPORT MEDIA 8-12 HR TAT - Swab, Nasopharynx [[id number]]  (Normal) Collected: 01/30/22 1604    Lab Status: Final result Specimen: Swab from Nasopharynx Updated: 01/30/22 1634     COVID19 Not Detected    Narrative:      Fact sheet for providers: https://www.fda.gov/media/724447/download    Fact sheet for patients: https://www.fda.gov/media/056638/download    Test performed by PCR.    Blood Culture - Blood, Arm, Left [[id number]]  (Normal) Collected: 01/30/22 1556    Lab Status: Preliminary result Specimen: Blood from Arm, Left Updated: 01/31/22 1616     Blood Culture No growth at 24 hours            Assessment:  Active Hospital Problems    Diagnosis  POA   • Herpes zoster ophthalmicus [B02.30]  Yes      Resolved Hospital Problems   No resolved problems to display.         Plan:  See above  Emerson Canseco MD   1/31/2022  22:47 EST    Much of this encounter note is an electronic transcription/translation of spoken language to printed text. The electronic translation of spoken language may permit erroneous, or at times, nonsensical words or phrases to be inadvertently transcribed; Although I have reviewed the note for such errors, some may still exist

## 2022-10-06 RX ORDER — CLOPIDOGREL BISULFATE 75 MG/1
TABLET ORAL
Qty: 90 TABLET | Refills: 3 | OUTPATIENT
Start: 2022-10-06

## 2022-10-06 RX ORDER — ROSUVASTATIN CALCIUM 10 MG/1
TABLET, COATED ORAL
Qty: 90 TABLET | Refills: 3 | OUTPATIENT
Start: 2022-10-06

## 2022-10-13 RX ORDER — CLOPIDOGREL BISULFATE 75 MG/1
TABLET ORAL
Qty: 90 TABLET | Refills: 1 | Status: SHIPPED | OUTPATIENT
Start: 2022-10-13

## 2022-10-20 RX ORDER — ROSUVASTATIN CALCIUM 10 MG/1
TABLET, COATED ORAL
Qty: 90 TABLET | Refills: 3 | OUTPATIENT
Start: 2022-10-20

## 2022-10-21 RX ORDER — ROSUVASTATIN CALCIUM 10 MG/1
TABLET, COATED ORAL
Qty: 90 TABLET | Refills: 3 | OUTPATIENT
Start: 2022-10-21

## 2023-04-14 ENCOUNTER — TELEPHONE (OUTPATIENT)
Dept: CARDIOLOGY | Facility: CLINIC | Age: 63
End: 2023-04-14
Payer: COMMERCIAL

## 2023-04-27 RX ORDER — CLOPIDOGREL BISULFATE 75 MG/1
TABLET ORAL
Qty: 90 TABLET | Refills: 1 | Status: SHIPPED | OUTPATIENT
Start: 2023-04-27

## 2025-08-14 ENCOUNTER — E-VISIT (OUTPATIENT)
Dept: ADMINISTRATIVE | Facility: OTHER | Age: 65
End: 2025-08-14
Payer: COMMERCIAL

## 2025-08-14 ENCOUNTER — E-VISIT (OUTPATIENT)
Dept: FAMILY MEDICINE CLINIC | Facility: TELEHEALTH | Age: 65
End: 2025-08-14
Payer: COMMERCIAL

## (undated) DEVICE — GLIDESHEATH SLENDER STAINLESS STEEL KIT: Brand: GLIDESHEATH SLENDER

## (undated) DEVICE — TR BAND RADIAL ARTERY COMPRESSION DEVICE: Brand: TR BAND

## (undated) DEVICE — DEV INDEFLATOR P/N 580289

## (undated) DEVICE — CATH DIAG IMPULSE FR4 5F 100CM

## (undated) DEVICE — CATH DIAG IMPULSE FL3.5 5F 100CM

## (undated) DEVICE — TREK CORONARY DILATATION CATHETER 3.0 MM X 12 MM / RAPID-EXCHANGE: Brand: TREK

## (undated) DEVICE — GW EMR FIX EXCHG J STD .035 3MM 260CM

## (undated) DEVICE — KT MANIFLD CARDIAC

## (undated) DEVICE — CATH4F INF PIG 145Â° MOD 110CM: Brand: INFINITI

## (undated) DEVICE — GC 5F 056 JR 4 LBT: Brand: BRITE TIP

## (undated) DEVICE — PK CATH CARD 40

## (undated) DEVICE — RUNTHROUGH NS EXTRA FLOPPY PTCA GUIDEWIRE: Brand: RUNTHROUGH